# Patient Record
Sex: MALE | Race: WHITE | Employment: OTHER | URBAN - METROPOLITAN AREA
[De-identification: names, ages, dates, MRNs, and addresses within clinical notes are randomized per-mention and may not be internally consistent; named-entity substitution may affect disease eponyms.]

---

## 2017-03-13 RX ORDER — FINASTERIDE 5 MG/1
TABLET, FILM COATED ORAL
Qty: 90 TABLET | Refills: 3 | Status: SHIPPED | OUTPATIENT
Start: 2017-03-13 | End: 2019-08-26 | Stop reason: SDUPTHER

## 2017-03-16 RX ORDER — GLIPIZIDE 5 MG/1
TABLET ORAL
Qty: 360 TABLET | Refills: 1 | OUTPATIENT
Start: 2017-03-16

## 2017-04-18 ENCOUNTER — OFFICE VISIT (OUTPATIENT)
Dept: UROLOGY | Age: 82
End: 2017-04-18

## 2017-04-18 VITALS
SYSTOLIC BLOOD PRESSURE: 118 MMHG | BODY MASS INDEX: 27.4 KG/M2 | DIASTOLIC BLOOD PRESSURE: 60 MMHG | HEIGHT: 69 IN | HEART RATE: 68 BPM | WEIGHT: 185 LBS

## 2017-04-18 DIAGNOSIS — N40.1 BPH WITH OBSTRUCTION/LOWER URINARY TRACT SYMPTOMS: Primary | ICD-10-CM

## 2017-04-18 DIAGNOSIS — N13.8 BPH WITH OBSTRUCTION/LOWER URINARY TRACT SYMPTOMS: Primary | ICD-10-CM

## 2017-04-18 PROCEDURE — 99213 OFFICE O/P EST LOW 20 MIN: CPT | Performed by: UROLOGY

## 2017-04-18 ASSESSMENT — ENCOUNTER SYMPTOMS
ABDOMINAL PAIN: 0
SHORTNESS OF BREATH: 0
ABDOMINAL DISTENTION: 0

## 2017-06-08 RX ORDER — GLIPIZIDE 5 MG/1
TABLET ORAL
Qty: 360 TABLET | Refills: 0 | Status: SHIPPED | OUTPATIENT
Start: 2017-06-08 | End: 2017-08-30 | Stop reason: SDUPTHER

## 2017-06-14 ENCOUNTER — OFFICE VISIT (OUTPATIENT)
Dept: FAMILY MEDICINE CLINIC | Age: 82
End: 2017-06-14

## 2017-06-14 VITALS
HEART RATE: 79 BPM | HEIGHT: 69 IN | DIASTOLIC BLOOD PRESSURE: 68 MMHG | WEIGHT: 194.9 LBS | OXYGEN SATURATION: 96 % | TEMPERATURE: 97.6 F | RESPIRATION RATE: 20 BRPM | BODY MASS INDEX: 28.87 KG/M2 | SYSTOLIC BLOOD PRESSURE: 132 MMHG

## 2017-06-14 DIAGNOSIS — E11.59 TYPE 2 DIABETES MELLITUS WITH OTHER CIRCULATORY COMPLICATIONS (HCC): Primary | ICD-10-CM

## 2017-06-14 DIAGNOSIS — I10 ESSENTIAL HYPERTENSION: ICD-10-CM

## 2017-06-14 DIAGNOSIS — E78.5 HYPERLIPIDEMIA, UNSPECIFIED HYPERLIPIDEMIA TYPE: ICD-10-CM

## 2017-06-14 DIAGNOSIS — N18.30 CKD (CHRONIC KIDNEY DISEASE) STAGE 3, GFR 30-59 ML/MIN (HCC): ICD-10-CM

## 2017-06-14 DIAGNOSIS — E11.59 TYPE 2 DIABETES MELLITUS WITH OTHER CIRCULATORY COMPLICATIONS (HCC): ICD-10-CM

## 2017-06-14 LAB
CREATININE URINE: 146.1 MG/DL
MICROALBUMIN UR-MCNC: 19.2 MG/DL
MICROALBUMIN/CREAT UR-RTO: 131.4 MG/G (ref 0–30)

## 2017-06-14 PROCEDURE — 99214 OFFICE O/P EST MOD 30 MIN: CPT | Performed by: FAMILY MEDICINE

## 2017-06-14 ASSESSMENT — ENCOUNTER SYMPTOMS: ABDOMINAL PAIN: 0

## 2017-06-19 DIAGNOSIS — I10 ESSENTIAL HYPERTENSION: ICD-10-CM

## 2017-06-19 DIAGNOSIS — E11.59 TYPE 2 DIABETES MELLITUS WITH OTHER CIRCULATORY COMPLICATIONS (HCC): ICD-10-CM

## 2017-06-19 DIAGNOSIS — E78.5 HYPERLIPIDEMIA, UNSPECIFIED HYPERLIPIDEMIA TYPE: ICD-10-CM

## 2017-06-19 LAB
ALBUMIN SERPL-MCNC: 3.9 G/DL (ref 3.9–4.9)
ALP BLD-CCNC: 69 U/L (ref 35–104)
ALT SERPL-CCNC: 14 U/L (ref 0–41)
ANION GAP SERPL CALCULATED.3IONS-SCNC: 13 MEQ/L (ref 7–13)
AST SERPL-CCNC: 16 U/L (ref 0–40)
BASOPHILS ABSOLUTE: 0.1 K/UL (ref 0–0.2)
BASOPHILS RELATIVE PERCENT: 1.1 %
BILIRUB SERPL-MCNC: 0.4 MG/DL (ref 0–1.2)
BUN BLDV-MCNC: 39 MG/DL (ref 8–23)
CALCIUM SERPL-MCNC: 8.9 MG/DL (ref 8.6–10.2)
CHLORIDE BLD-SCNC: 105 MEQ/L (ref 98–107)
CHOLESTEROL, TOTAL: 161 MG/DL (ref 0–199)
CO2: 24 MEQ/L (ref 22–29)
CREAT SERPL-MCNC: 1.62 MG/DL (ref 0.7–1.2)
EOSINOPHILS ABSOLUTE: 0.2 K/UL (ref 0–0.7)
EOSINOPHILS RELATIVE PERCENT: 3.3 %
GFR AFRICAN AMERICAN: 49.2
GFR NON-AFRICAN AMERICAN: 40.6
GLOBULIN: 2.1 G/DL (ref 2.3–3.5)
GLUCOSE BLD-MCNC: 241 MG/DL (ref 74–109)
HBA1C MFR BLD: 9.8 % (ref 4.8–5.9)
HCT VFR BLD CALC: 38.9 % (ref 42–52)
HDLC SERPL-MCNC: 37 MG/DL (ref 40–59)
HEMOGLOBIN: 12.6 G/DL (ref 14–18)
LDL CHOLESTEROL CALCULATED: 100 MG/DL (ref 0–129)
LYMPHOCYTES ABSOLUTE: 1.9 K/UL (ref 1–4.8)
LYMPHOCYTES RELATIVE PERCENT: 29.1 %
MCH RBC QN AUTO: 26.8 PG (ref 27–31.3)
MCHC RBC AUTO-ENTMCNC: 32.3 % (ref 33–37)
MCV RBC AUTO: 83 FL (ref 80–100)
MONOCYTES ABSOLUTE: 0.6 K/UL (ref 0.2–0.8)
MONOCYTES RELATIVE PERCENT: 9.3 %
NEUTROPHILS ABSOLUTE: 3.7 K/UL (ref 1.4–6.5)
NEUTROPHILS RELATIVE PERCENT: 57.2 %
PDW BLD-RTO: 14.8 % (ref 11.5–14.5)
PLATELET # BLD: 148 K/UL (ref 130–400)
POTASSIUM SERPL-SCNC: 4.8 MEQ/L (ref 3.5–5.1)
RBC # BLD: 4.69 M/UL (ref 4.7–6.1)
SODIUM BLD-SCNC: 142 MEQ/L (ref 132–144)
TOTAL PROTEIN: 6 G/DL (ref 6.4–8.1)
TRIGL SERPL-MCNC: 119 MG/DL (ref 0–200)
WBC # BLD: 6.5 K/UL (ref 4.8–10.8)

## 2017-06-26 ENCOUNTER — OFFICE VISIT (OUTPATIENT)
Dept: FAMILY MEDICINE CLINIC | Age: 82
End: 2017-06-26

## 2017-06-26 VITALS
HEART RATE: 73 BPM | OXYGEN SATURATION: 97 % | BODY MASS INDEX: 28.91 KG/M2 | TEMPERATURE: 96.9 F | HEIGHT: 69 IN | SYSTOLIC BLOOD PRESSURE: 126 MMHG | RESPIRATION RATE: 18 BRPM | DIASTOLIC BLOOD PRESSURE: 66 MMHG | WEIGHT: 195.2 LBS

## 2017-06-26 DIAGNOSIS — E78.5 HYPERLIPIDEMIA, UNSPECIFIED HYPERLIPIDEMIA TYPE: ICD-10-CM

## 2017-06-26 DIAGNOSIS — D64.9 ANEMIA, UNSPECIFIED TYPE: ICD-10-CM

## 2017-06-26 DIAGNOSIS — E11.59 TYPE 2 DIABETES MELLITUS WITH OTHER CIRCULATORY COMPLICATIONS (HCC): Primary | ICD-10-CM

## 2017-06-26 PROCEDURE — 99212 OFFICE O/P EST SF 10 MIN: CPT | Performed by: FAMILY MEDICINE

## 2017-06-26 RX ORDER — ATORVASTATIN CALCIUM 80 MG/1
80 TABLET, FILM COATED ORAL DAILY
Qty: 90 TABLET | Refills: 1 | Status: SHIPPED | OUTPATIENT
Start: 2017-06-26 | End: 2017-09-01 | Stop reason: SDUPTHER

## 2017-06-26 ASSESSMENT — ENCOUNTER SYMPTOMS: ABDOMINAL PAIN: 0

## 2017-07-25 ENCOUNTER — OFFICE VISIT (OUTPATIENT)
Dept: FAMILY MEDICINE CLINIC | Age: 82
End: 2017-07-25

## 2017-07-25 VITALS
WEIGHT: 196.2 LBS | OXYGEN SATURATION: 98 % | RESPIRATION RATE: 21 BRPM | HEIGHT: 69 IN | HEART RATE: 76 BPM | DIASTOLIC BLOOD PRESSURE: 72 MMHG | BODY MASS INDEX: 29.06 KG/M2 | TEMPERATURE: 96.7 F | SYSTOLIC BLOOD PRESSURE: 132 MMHG

## 2017-07-25 DIAGNOSIS — E11.59 TYPE 2 DIABETES MELLITUS WITH OTHER CIRCULATORY COMPLICATIONS (HCC): Primary | ICD-10-CM

## 2017-07-25 PROCEDURE — 99212 OFFICE O/P EST SF 10 MIN: CPT | Performed by: FAMILY MEDICINE

## 2017-08-30 RX ORDER — GLIPIZIDE 5 MG/1
TABLET ORAL
Qty: 360 TABLET | Refills: 0 | Status: SHIPPED | OUTPATIENT
Start: 2017-08-30 | End: 2017-11-20 | Stop reason: SDUPTHER

## 2017-09-01 RX ORDER — ATORVASTATIN CALCIUM 80 MG/1
80 TABLET, FILM COATED ORAL DAILY
Qty: 90 TABLET | Refills: 0 | Status: SHIPPED | OUTPATIENT
Start: 2017-09-01 | End: 2018-01-04 | Stop reason: SDUPTHER

## 2017-09-20 DIAGNOSIS — E11.59 TYPE 2 DIABETES MELLITUS WITH OTHER CIRCULATORY COMPLICATIONS (HCC): ICD-10-CM

## 2017-09-20 LAB
ALBUMIN SERPL-MCNC: 3.9 G/DL (ref 3.9–4.9)
ALP BLD-CCNC: 75 U/L (ref 35–104)
ALT SERPL-CCNC: 14 U/L (ref 0–41)
ANION GAP SERPL CALCULATED.3IONS-SCNC: 19 MEQ/L (ref 7–13)
AST SERPL-CCNC: 18 U/L (ref 0–40)
BILIRUB SERPL-MCNC: 0.5 MG/DL (ref 0–1.2)
BUN BLDV-MCNC: 42 MG/DL (ref 8–23)
CALCIUM SERPL-MCNC: 8.9 MG/DL (ref 8.6–10.2)
CHLORIDE BLD-SCNC: 103 MEQ/L (ref 98–107)
CHOLESTEROL, TOTAL: 124 MG/DL (ref 0–199)
CO2: 21 MEQ/L (ref 22–29)
CREAT SERPL-MCNC: 1.66 MG/DL (ref 0.7–1.2)
GFR AFRICAN AMERICAN: 47.8
GFR NON-AFRICAN AMERICAN: 39.5
GLOBULIN: 2.3 G/DL (ref 2.3–3.5)
GLUCOSE BLD-MCNC: 267 MG/DL (ref 74–109)
HBA1C MFR BLD: 9.3 % (ref 4.8–5.9)
HDLC SERPL-MCNC: 36 MG/DL (ref 40–59)
LDL CHOLESTEROL CALCULATED: 61 MG/DL (ref 0–129)
POTASSIUM SERPL-SCNC: 5.3 MEQ/L (ref 3.5–5.1)
SODIUM BLD-SCNC: 143 MEQ/L (ref 132–144)
TOTAL PROTEIN: 6.2 G/DL (ref 6.4–8.1)
TRIGL SERPL-MCNC: 133 MG/DL (ref 0–200)

## 2017-09-28 ENCOUNTER — OFFICE VISIT (OUTPATIENT)
Dept: FAMILY MEDICINE CLINIC | Age: 82
End: 2017-09-28

## 2017-09-28 VITALS
HEIGHT: 69 IN | DIASTOLIC BLOOD PRESSURE: 62 MMHG | RESPIRATION RATE: 16 BRPM | TEMPERATURE: 97.8 F | OXYGEN SATURATION: 98 % | HEART RATE: 71 BPM | BODY MASS INDEX: 29.27 KG/M2 | WEIGHT: 197.6 LBS | SYSTOLIC BLOOD PRESSURE: 132 MMHG

## 2017-09-28 DIAGNOSIS — N18.30 CKD (CHRONIC KIDNEY DISEASE) STAGE 3, GFR 30-59 ML/MIN (HCC): ICD-10-CM

## 2017-09-28 DIAGNOSIS — E11.59 TYPE 2 DIABETES MELLITUS WITH OTHER CIRCULATORY COMPLICATIONS (HCC): Primary | ICD-10-CM

## 2017-09-28 DIAGNOSIS — E87.5 HYPERKALEMIA: ICD-10-CM

## 2017-09-28 DIAGNOSIS — D64.9 ANEMIA, UNSPECIFIED TYPE: ICD-10-CM

## 2017-09-28 LAB — POTASSIUM SERPL-SCNC: 5 MEQ/L (ref 3.5–5.1)

## 2017-09-28 PROCEDURE — 99213 OFFICE O/P EST LOW 20 MIN: CPT | Performed by: FAMILY MEDICINE

## 2017-09-28 ASSESSMENT — ENCOUNTER SYMPTOMS: ABDOMINAL PAIN: 0

## 2017-11-20 RX ORDER — GLIPIZIDE 5 MG/1
TABLET ORAL
Qty: 360 TABLET | Refills: 0 | Status: SHIPPED | OUTPATIENT
Start: 2017-11-20 | End: 2017-11-22 | Stop reason: SDUPTHER

## 2017-11-22 RX ORDER — GLIPIZIDE 5 MG/1
TABLET ORAL
Qty: 360 TABLET | Refills: 0 | Status: SHIPPED | OUTPATIENT
Start: 2017-11-22 | End: 2018-01-04 | Stop reason: SDUPTHER

## 2017-12-28 DIAGNOSIS — E11.59 TYPE 2 DIABETES MELLITUS WITH OTHER CIRCULATORY COMPLICATIONS (CODE): ICD-10-CM

## 2017-12-28 LAB — HBA1C MFR BLD: 9.4 % (ref 4.8–5.9)

## 2018-01-04 ENCOUNTER — OFFICE VISIT (OUTPATIENT)
Dept: FAMILY MEDICINE CLINIC | Age: 83
End: 2018-01-04

## 2018-01-04 VITALS
SYSTOLIC BLOOD PRESSURE: 128 MMHG | TEMPERATURE: 98.3 F | DIASTOLIC BLOOD PRESSURE: 64 MMHG | HEART RATE: 80 BPM | OXYGEN SATURATION: 94 % | RESPIRATION RATE: 18 BRPM | BODY MASS INDEX: 29.18 KG/M2 | WEIGHT: 197 LBS | HEIGHT: 69 IN

## 2018-01-04 DIAGNOSIS — N18.30 CKD (CHRONIC KIDNEY DISEASE) STAGE 3, GFR 30-59 ML/MIN (HCC): ICD-10-CM

## 2018-01-04 DIAGNOSIS — E11.59 TYPE 2 DIABETES MELLITUS WITH OTHER CIRCULATORY COMPLICATIONS (CODE): Primary | ICD-10-CM

## 2018-01-04 DIAGNOSIS — E11.59 TYPE 2 DIABETES MELLITUS WITH OTHER CIRCULATORY COMPLICATIONS (CODE): ICD-10-CM

## 2018-01-04 DIAGNOSIS — E78.5 HYPERLIPIDEMIA, UNSPECIFIED HYPERLIPIDEMIA TYPE: ICD-10-CM

## 2018-01-04 DIAGNOSIS — I10 ESSENTIAL HYPERTENSION: ICD-10-CM

## 2018-01-04 LAB
CREATININE URINE: 150.9 MG/DL
MICROALBUMIN UR-MCNC: 21.7 MG/DL
MICROALBUMIN/CREAT UR-RTO: 143.8 MG/G (ref 0–30)

## 2018-01-04 PROCEDURE — 1036F TOBACCO NON-USER: CPT | Performed by: FAMILY MEDICINE

## 2018-01-04 PROCEDURE — G8417 CALC BMI ABV UP PARAM F/U: HCPCS | Performed by: FAMILY MEDICINE

## 2018-01-04 PROCEDURE — 4040F PNEUMOC VAC/ADMIN/RCVD: CPT | Performed by: FAMILY MEDICINE

## 2018-01-04 PROCEDURE — 1123F ACP DISCUSS/DSCN MKR DOCD: CPT | Performed by: FAMILY MEDICINE

## 2018-01-04 PROCEDURE — G8484 FLU IMMUNIZE NO ADMIN: HCPCS | Performed by: FAMILY MEDICINE

## 2018-01-04 PROCEDURE — G8598 ASA/ANTIPLAT THER USED: HCPCS | Performed by: FAMILY MEDICINE

## 2018-01-04 PROCEDURE — 99214 OFFICE O/P EST MOD 30 MIN: CPT | Performed by: FAMILY MEDICINE

## 2018-01-04 PROCEDURE — G8427 DOCREV CUR MEDS BY ELIG CLIN: HCPCS | Performed by: FAMILY MEDICINE

## 2018-01-04 RX ORDER — ATORVASTATIN CALCIUM 80 MG/1
80 TABLET, FILM COATED ORAL DAILY
Qty: 90 TABLET | Refills: 1 | Status: SHIPPED | OUTPATIENT
Start: 2018-01-04 | End: 2018-05-12 | Stop reason: SDUPTHER

## 2018-01-04 RX ORDER — GLIPIZIDE 5 MG/1
TABLET ORAL
Qty: 360 TABLET | Refills: 0 | Status: SHIPPED | OUTPATIENT
Start: 2018-01-04 | End: 2018-05-12 | Stop reason: SDUPTHER

## 2018-01-04 ASSESSMENT — ENCOUNTER SYMPTOMS: ABDOMINAL PAIN: 0

## 2018-01-04 NOTE — PROGRESS NOTES
with supper       finasteride (PROSCAR) 5 MG tablet TAKE ONE TABLET BY MOUTH ONCE DAILY 90 tablet 3    losartan-hydrochlorothiazide (HYZAAR) 100-12.5 MG per tablet   Take 1 tablet by mouth       carvedilol (COREG) 6.25 MG tablet Take 6.25 mg by mouth 2 times daily (with meals).  omeprazole (PRILOSEC) 20 MG capsule Take 20 mg by mouth daily.  aspirin 81 MG tablet Take 81 mg by mouth daily. No current facility-administered medications for this visit. History reviewed. No pertinent family history. Past Medical History:   Diagnosis Date    CAD (coronary artery disease)     Hyperlipidemia     Hypertension     Type II or unspecified type diabetes mellitus without mention of complication, not stated as uncontrolled      Objective:   /64 (Site: Left Arm, Position: Sitting, Cuff Size: Small Adult)   Pulse 80   Temp 98.3 °F (36.8 °C) (Tympanic)   Resp 18   Ht 5' 9\" (1.753 m)   Wt 197 lb (89.4 kg)   SpO2 94%   BMI 29.09 kg/m²     Physical Exam  Neck:no carotid bruits. No masses. No adenopathy. No thyroid asymmetry. Lungs:clear and equal breath sounds. No wheezes or rales. Heart:rate reg. No murmur. No gallops   Pulses:Radials 2+ equal               Non palpable D.P or P.T bilaterally   Extremities: trace edema of both lower legs   Gen: In no acute distress        Dorsalis pedis and posterior tibial pulses are non palpable   No fissures between the toes. No open sores on the feet. Tactile sensation intact   Assessment:      1. Type 2 diabetes mellitus with other circulatory complications (CODE) ([facility] [address]     2. Essential hypertension     3. Hyperlipidemia, unspecified hyperlipidemia type     4.  CKD (chronic kidney disease) stage 3, GFR 30-59 ml/min           Plan:    continue current medications  Orders Placed This Encounter   Medications    glipiZIDE (GLUCOTROL) 5 MG tablet     Sig: TAKE 2 TABLETS TWICE DAILY BEFORE MEALS     Dispense:  360 tablet     Refill:  0   

## 2018-01-25 ENCOUNTER — OFFICE VISIT (OUTPATIENT)
Dept: FAMILY MEDICINE CLINIC | Age: 83
End: 2018-01-25
Payer: MEDICARE

## 2018-01-25 VITALS
SYSTOLIC BLOOD PRESSURE: 120 MMHG | HEIGHT: 69 IN | WEIGHT: 196 LBS | BODY MASS INDEX: 29.03 KG/M2 | RESPIRATION RATE: 15 BRPM | TEMPERATURE: 97.7 F | DIASTOLIC BLOOD PRESSURE: 60 MMHG | HEART RATE: 70 BPM

## 2018-01-25 DIAGNOSIS — J01.40 ACUTE NON-RECURRENT PANSINUSITIS: ICD-10-CM

## 2018-01-25 DIAGNOSIS — J20.9 ACUTE BRONCHITIS, UNSPECIFIED ORGANISM: Primary | ICD-10-CM

## 2018-01-25 DIAGNOSIS — E11.59 TYPE 2 DIABETES MELLITUS WITH OTHER CIRCULATORY COMPLICATIONS (CODE): ICD-10-CM

## 2018-01-25 PROCEDURE — 99213 OFFICE O/P EST LOW 20 MIN: CPT | Performed by: FAMILY MEDICINE

## 2018-01-25 PROCEDURE — 1036F TOBACCO NON-USER: CPT | Performed by: FAMILY MEDICINE

## 2018-01-25 PROCEDURE — 4040F PNEUMOC VAC/ADMIN/RCVD: CPT | Performed by: FAMILY MEDICINE

## 2018-01-25 PROCEDURE — 1123F ACP DISCUSS/DSCN MKR DOCD: CPT | Performed by: FAMILY MEDICINE

## 2018-01-25 PROCEDURE — G8417 CALC BMI ABV UP PARAM F/U: HCPCS | Performed by: FAMILY MEDICINE

## 2018-01-25 PROCEDURE — G8484 FLU IMMUNIZE NO ADMIN: HCPCS | Performed by: FAMILY MEDICINE

## 2018-01-25 PROCEDURE — G8598 ASA/ANTIPLAT THER USED: HCPCS | Performed by: FAMILY MEDICINE

## 2018-01-25 PROCEDURE — G8427 DOCREV CUR MEDS BY ELIG CLIN: HCPCS | Performed by: FAMILY MEDICINE

## 2018-01-25 RX ORDER — CEFUROXIME AXETIL 250 MG/1
250 TABLET ORAL 2 TIMES DAILY
Qty: 20 TABLET | Refills: 0 | Status: SHIPPED | OUTPATIENT
Start: 2018-01-25 | End: 2018-02-04

## 2018-01-25 ASSESSMENT — ENCOUNTER SYMPTOMS
ABDOMINAL PAIN: 0
SHORTNESS OF BREATH: 0

## 2018-01-25 ASSESSMENT — PATIENT HEALTH QUESTIONNAIRE - PHQ9
1. LITTLE INTEREST OR PLEASURE IN DOING THINGS: 0
2. FEELING DOWN, DEPRESSED OR HOPELESS: 0
SUM OF ALL RESPONSES TO PHQ9 QUESTIONS 1 & 2: 0
SUM OF ALL RESPONSES TO PHQ QUESTIONS 1-9: 0

## 2018-01-25 NOTE — PROGRESS NOTES
Subjective:      Patient ID: Ron Mcneill is a 80 y.o. male    HPI  Here with cold symptoms that started about 2 weeks ago. Starting to bring up discolored phlegm. No sore throat. Has had some hoarseness and slight nasal congestion. No fever. Has had discolored nasal drainage as well. Also here for follow up of blood sugars. Still running in upper 100's in am but has been sick during this time  Review of Systems   Constitutional: Negative for activity change, chills and unexpected weight change. HENT: Positive for congestion. Respiratory: Negative for shortness of breath. Cardiovascular: Negative for chest pain. Gastrointestinal: Negative for abdominal pain. Skin: Negative for rash. Reviewed allergy, medical, social, surgical, family and med list changes and updated   Files  Social History     Social History    Marital status:      Spouse name: N/A    Number of children: N/A    Years of education: N/A     Social History Main Topics    Smoking status: Never Smoker    Smokeless tobacco: Never Used    Alcohol use No    Drug use: No    Sexual activity: No     Other Topics Concern    None     Social History Narrative    None     Current Outpatient Prescriptions   Medication Sig Dispense Refill    glipiZIDE (GLUCOTROL) 5 MG tablet TAKE 2 TABLETS TWICE DAILY BEFORE MEALS 360 tablet 0    atorvastatin (LIPITOR) 80 MG tablet Take 1 tablet by mouth daily 90 tablet 1    insulin glargine (LANTUS SOLOSTAR) 100 UNIT/ML injection pen Inject 41 Units into the skin daily       finasteride (PROSCAR) 5 MG tablet TAKE ONE TABLET BY MOUTH ONCE DAILY 90 tablet 3    losartan-hydrochlorothiazide (HYZAAR) 100-12.5 MG per tablet   Take 1 tablet by mouth       carvedilol (COREG) 6.25 MG tablet Take 6.25 mg by mouth 2 times daily (with meals).  omeprazole (PRILOSEC) 20 MG capsule Take 20 mg by mouth daily.  aspirin 81 MG tablet Take 81 mg by mouth daily.          No current

## 2018-02-27 RX ORDER — LOSARTAN POTASSIUM AND HYDROCHLOROTHIAZIDE 12.5; 1 MG/1; MG/1
1 TABLET ORAL DAILY
Qty: 90 TABLET | Refills: 0 | Status: SHIPPED | OUTPATIENT
Start: 2018-02-27 | End: 2018-05-12 | Stop reason: SDUPTHER

## 2018-02-27 RX ORDER — FINASTERIDE 5 MG/1
5 TABLET, FILM COATED ORAL DAILY
Qty: 90 TABLET | Refills: 3 | Status: ON HOLD | OUTPATIENT
Start: 2018-02-27 | End: 2018-09-27

## 2018-04-19 DIAGNOSIS — N40.1 BENIGN PROSTATIC HYPERPLASIA WITH LOWER URINARY TRACT SYMPTOMS, SYMPTOM DETAILS UNSPECIFIED: ICD-10-CM

## 2018-04-19 DIAGNOSIS — N40.1 BENIGN PROSTATIC HYPERPLASIA WITH LOWER URINARY TRACT SYMPTOMS, SYMPTOM DETAILS UNSPECIFIED: Primary | ICD-10-CM

## 2018-04-19 LAB — PROSTATE SPECIFIC ANTIGEN: 2.75 NG/ML (ref 0–6.22)

## 2018-04-24 ENCOUNTER — OFFICE VISIT (OUTPATIENT)
Dept: UROLOGY | Age: 83
End: 2018-04-24
Payer: MEDICARE

## 2018-04-24 VITALS
SYSTOLIC BLOOD PRESSURE: 132 MMHG | HEART RATE: 67 BPM | HEIGHT: 70 IN | DIASTOLIC BLOOD PRESSURE: 60 MMHG | BODY MASS INDEX: 26.48 KG/M2 | WEIGHT: 185 LBS

## 2018-04-24 DIAGNOSIS — N13.8 BPH WITH OBSTRUCTION/LOWER URINARY TRACT SYMPTOMS: Primary | ICD-10-CM

## 2018-04-24 DIAGNOSIS — N40.1 BPH WITH OBSTRUCTION/LOWER URINARY TRACT SYMPTOMS: Primary | ICD-10-CM

## 2018-04-24 DIAGNOSIS — R97.20 ELEVATED PSA: ICD-10-CM

## 2018-04-24 PROCEDURE — 1123F ACP DISCUSS/DSCN MKR DOCD: CPT | Performed by: UROLOGY

## 2018-04-24 PROCEDURE — 99213 OFFICE O/P EST LOW 20 MIN: CPT | Performed by: UROLOGY

## 2018-04-24 PROCEDURE — G8598 ASA/ANTIPLAT THER USED: HCPCS | Performed by: UROLOGY

## 2018-04-24 PROCEDURE — G8417 CALC BMI ABV UP PARAM F/U: HCPCS | Performed by: UROLOGY

## 2018-04-24 PROCEDURE — 1036F TOBACCO NON-USER: CPT | Performed by: UROLOGY

## 2018-04-24 PROCEDURE — 4040F PNEUMOC VAC/ADMIN/RCVD: CPT | Performed by: UROLOGY

## 2018-04-24 PROCEDURE — G8427 DOCREV CUR MEDS BY ELIG CLIN: HCPCS | Performed by: UROLOGY

## 2018-04-24 ASSESSMENT — ENCOUNTER SYMPTOMS
SHORTNESS OF BREATH: 0
ABDOMINAL PAIN: 0
ABDOMINAL DISTENTION: 0

## 2018-05-12 ENCOUNTER — OFFICE VISIT (OUTPATIENT)
Dept: FAMILY MEDICINE CLINIC | Age: 83
End: 2018-05-12
Payer: MEDICARE

## 2018-05-12 VITALS
SYSTOLIC BLOOD PRESSURE: 128 MMHG | DIASTOLIC BLOOD PRESSURE: 76 MMHG | HEIGHT: 69 IN | BODY MASS INDEX: 28.88 KG/M2 | TEMPERATURE: 97.4 F | RESPIRATION RATE: 16 BRPM | HEART RATE: 72 BPM | WEIGHT: 195 LBS

## 2018-05-12 DIAGNOSIS — N18.30 CKD (CHRONIC KIDNEY DISEASE) STAGE 3, GFR 30-59 ML/MIN (HCC): ICD-10-CM

## 2018-05-12 DIAGNOSIS — E78.5 HYPERLIPIDEMIA, UNSPECIFIED HYPERLIPIDEMIA TYPE: ICD-10-CM

## 2018-05-12 DIAGNOSIS — E11.59 TYPE 2 DIABETES MELLITUS WITH OTHER CIRCULATORY COMPLICATIONS (CODE): Primary | ICD-10-CM

## 2018-05-12 DIAGNOSIS — I10 ESSENTIAL HYPERTENSION: ICD-10-CM

## 2018-05-12 PROCEDURE — G8417 CALC BMI ABV UP PARAM F/U: HCPCS | Performed by: FAMILY MEDICINE

## 2018-05-12 PROCEDURE — G8427 DOCREV CUR MEDS BY ELIG CLIN: HCPCS | Performed by: FAMILY MEDICINE

## 2018-05-12 PROCEDURE — 1123F ACP DISCUSS/DSCN MKR DOCD: CPT | Performed by: FAMILY MEDICINE

## 2018-05-12 PROCEDURE — 1036F TOBACCO NON-USER: CPT | Performed by: FAMILY MEDICINE

## 2018-05-12 PROCEDURE — 99214 OFFICE O/P EST MOD 30 MIN: CPT | Performed by: FAMILY MEDICINE

## 2018-05-12 PROCEDURE — 4040F PNEUMOC VAC/ADMIN/RCVD: CPT | Performed by: FAMILY MEDICINE

## 2018-05-12 PROCEDURE — G8598 ASA/ANTIPLAT THER USED: HCPCS | Performed by: FAMILY MEDICINE

## 2018-05-12 RX ORDER — GLIPIZIDE 5 MG/1
TABLET ORAL
Qty: 360 TABLET | Refills: 1 | Status: ON HOLD | OUTPATIENT
Start: 2018-05-12 | End: 2018-09-29 | Stop reason: HOSPADM

## 2018-05-12 RX ORDER — ATORVASTATIN CALCIUM 80 MG/1
80 TABLET, FILM COATED ORAL DAILY
Qty: 90 TABLET | Refills: 1 | Status: SHIPPED | OUTPATIENT
Start: 2018-05-12

## 2018-05-12 RX ORDER — LOSARTAN POTASSIUM AND HYDROCHLOROTHIAZIDE 12.5; 1 MG/1; MG/1
1 TABLET ORAL DAILY
Qty: 90 TABLET | Refills: 1 | Status: ON HOLD | OUTPATIENT
Start: 2018-05-12 | End: 2018-09-29 | Stop reason: HOSPADM

## 2018-05-12 ASSESSMENT — ENCOUNTER SYMPTOMS
SHORTNESS OF BREATH: 0
ABDOMINAL PAIN: 0

## 2018-09-27 ENCOUNTER — APPOINTMENT (OUTPATIENT)
Dept: GENERAL RADIOLOGY | Age: 83
DRG: 291 | End: 2018-09-27
Payer: MEDICARE

## 2018-09-27 ENCOUNTER — TELEPHONE (OUTPATIENT)
Dept: FAMILY MEDICINE CLINIC | Age: 83
End: 2018-09-27

## 2018-09-27 ENCOUNTER — HOSPITAL ENCOUNTER (INPATIENT)
Age: 83
LOS: 3 days | Discharge: HOME | DRG: 291 | End: 2018-09-30
Attending: STUDENT IN AN ORGANIZED HEALTH CARE EDUCATION/TRAINING PROGRAM | Admitting: INTERNAL MEDICINE
Payer: MEDICARE

## 2018-09-27 DIAGNOSIS — I50.9 CONGESTIVE HEART FAILURE, UNSPECIFIED HF CHRONICITY, UNSPECIFIED HEART FAILURE TYPE (HCC): Primary | ICD-10-CM

## 2018-09-27 DIAGNOSIS — E11.65 TYPE 2 DIABETES MELLITUS WITH HYPERGLYCEMIA, UNSPECIFIED WHETHER LONG TERM INSULIN USE (HCC): ICD-10-CM

## 2018-09-27 DIAGNOSIS — R94.31 ABNORMAL EKG: ICD-10-CM

## 2018-09-27 DIAGNOSIS — R06.09 EXERTIONAL DYSPNEA: ICD-10-CM

## 2018-09-27 DIAGNOSIS — E66.9 OBESITY WITH SERIOUS COMORBIDITY, UNSPECIFIED CLASSIFICATION, UNSPECIFIED OBESITY TYPE: ICD-10-CM

## 2018-09-27 DIAGNOSIS — N18.30 CKD STAGE 3 DUE TO TYPE 2 DIABETES MELLITUS (HCC): ICD-10-CM

## 2018-09-27 DIAGNOSIS — E11.22 CKD STAGE 3 DUE TO TYPE 2 DIABETES MELLITUS (HCC): ICD-10-CM

## 2018-09-27 DIAGNOSIS — N28.9 RENAL INSUFFICIENCY: ICD-10-CM

## 2018-09-27 PROBLEM — I50.43 CHF (CONGESTIVE HEART FAILURE), NYHA CLASS I, ACUTE ON CHRONIC, COMBINED (HCC): Status: ACTIVE | Noted: 2018-09-27

## 2018-09-27 LAB
ALBUMIN SERPL-MCNC: 3.8 G/DL (ref 3.9–4.9)
ALP BLD-CCNC: 108 U/L (ref 35–104)
ALT SERPL-CCNC: 19 U/L (ref 0–41)
ANION GAP SERPL CALCULATED.3IONS-SCNC: 12 MEQ/L (ref 7–13)
APTT: 27.6 SEC (ref 21.6–35.4)
AST SERPL-CCNC: 20 U/L (ref 0–40)
BASOPHILS ABSOLUTE: 0.1 K/UL (ref 0–0.2)
BASOPHILS RELATIVE PERCENT: 1.1 %
BILIRUB SERPL-MCNC: 0.8 MG/DL (ref 0–1.2)
BUN BLDV-MCNC: 28 MG/DL (ref 8–23)
C-REACTIVE PROTEIN, HIGH SENSITIVITY: 15.4 MG/L (ref 0–5)
CALCIUM SERPL-MCNC: 8.9 MG/DL (ref 8.6–10.2)
CHLORIDE BLD-SCNC: 102 MEQ/L (ref 98–107)
CO2: 25 MEQ/L (ref 22–29)
CREAT SERPL-MCNC: 1.43 MG/DL (ref 0.7–1.2)
EOSINOPHILS ABSOLUTE: 0.2 K/UL (ref 0–0.7)
EOSINOPHILS RELATIVE PERCENT: 2.6 %
GFR AFRICAN AMERICAN: 56.6
GFR NON-AFRICAN AMERICAN: 46.8
GLOBULIN: 2.7 G/DL (ref 2.3–3.5)
GLUCOSE BLD-MCNC: 193 MG/DL (ref 60–115)
GLUCOSE BLD-MCNC: 222 MG/DL (ref 60–115)
GLUCOSE BLD-MCNC: 243 MG/DL (ref 74–109)
HCT VFR BLD CALC: 37.5 % (ref 42–52)
HEMOGLOBIN: 12.3 G/DL (ref 14–18)
INR BLD: 1
LYMPHOCYTES ABSOLUTE: 1 K/UL (ref 1–4.8)
LYMPHOCYTES RELATIVE PERCENT: 14.7 %
MCH RBC QN AUTO: 27.3 PG (ref 27–31.3)
MCHC RBC AUTO-ENTMCNC: 32.8 % (ref 33–37)
MCV RBC AUTO: 83.2 FL (ref 80–100)
MONOCYTES ABSOLUTE: 0.5 K/UL (ref 0.2–0.8)
MONOCYTES RELATIVE PERCENT: 7.9 %
NEUTROPHILS ABSOLUTE: 5 K/UL (ref 1.4–6.5)
NEUTROPHILS RELATIVE PERCENT: 73.7 %
PDW BLD-RTO: 17.8 % (ref 11.5–14.5)
PERFORMED ON: ABNORMAL
PERFORMED ON: ABNORMAL
PLATELET # BLD: 163 K/UL (ref 130–400)
POTASSIUM SERPL-SCNC: 4.8 MEQ/L (ref 3.5–5.1)
PRO-BNP: 1343 PG/ML
PROTHROMBIN TIME: 10.2 SEC (ref 9.6–12.3)
RBC # BLD: 4.5 M/UL (ref 4.7–6.1)
SODIUM BLD-SCNC: 139 MEQ/L (ref 132–144)
TOTAL CK: 142 U/L (ref 0–190)
TOTAL PROTEIN: 6.5 G/DL (ref 6.4–8.1)
TROPONIN: <0.01 NG/ML (ref 0–0.01)
TROPONIN: <0.01 NG/ML (ref 0–0.01)
WBC # BLD: 6.8 K/UL (ref 4.8–10.8)

## 2018-09-27 PROCEDURE — 85610 PROTHROMBIN TIME: CPT

## 2018-09-27 PROCEDURE — 6370000000 HC RX 637 (ALT 250 FOR IP): Performed by: STUDENT IN AN ORGANIZED HEALTH CARE EDUCATION/TRAINING PROGRAM

## 2018-09-27 PROCEDURE — 2060000000 HC ICU INTERMEDIATE R&B

## 2018-09-27 PROCEDURE — 80053 COMPREHEN METABOLIC PANEL: CPT

## 2018-09-27 PROCEDURE — 96374 THER/PROPH/DIAG INJ IV PUSH: CPT

## 2018-09-27 PROCEDURE — 96372 THER/PROPH/DIAG INJ SC/IM: CPT

## 2018-09-27 PROCEDURE — 6360000002 HC RX W HCPCS: Performed by: STUDENT IN AN ORGANIZED HEALTH CARE EDUCATION/TRAINING PROGRAM

## 2018-09-27 PROCEDURE — 85025 COMPLETE CBC W/AUTO DIFF WBC: CPT

## 2018-09-27 PROCEDURE — 84484 ASSAY OF TROPONIN QUANT: CPT

## 2018-09-27 PROCEDURE — 36415 COLL VENOUS BLD VENIPUNCTURE: CPT

## 2018-09-27 PROCEDURE — 82550 ASSAY OF CK (CPK): CPT

## 2018-09-27 PROCEDURE — 93005 ELECTROCARDIOGRAM TRACING: CPT

## 2018-09-27 PROCEDURE — 6370000000 HC RX 637 (ALT 250 FOR IP): Performed by: INTERNAL MEDICINE

## 2018-09-27 PROCEDURE — 85730 THROMBOPLASTIN TIME PARTIAL: CPT

## 2018-09-27 PROCEDURE — 99285 EMERGENCY DEPT VISIT HI MDM: CPT

## 2018-09-27 PROCEDURE — 86141 C-REACTIVE PROTEIN HS: CPT

## 2018-09-27 PROCEDURE — 83880 ASSAY OF NATRIURETIC PEPTIDE: CPT

## 2018-09-27 PROCEDURE — 6360000002 HC RX W HCPCS: Performed by: PHYSICIAN ASSISTANT

## 2018-09-27 PROCEDURE — 71046 X-RAY EXAM CHEST 2 VIEWS: CPT

## 2018-09-27 PROCEDURE — 6370000000 HC RX 637 (ALT 250 FOR IP): Performed by: PHYSICIAN ASSISTANT

## 2018-09-27 PROCEDURE — 2580000003 HC RX 258: Performed by: PHYSICIAN ASSISTANT

## 2018-09-27 PROCEDURE — 96376 TX/PRO/DX INJ SAME DRUG ADON: CPT

## 2018-09-27 RX ORDER — PANTOPRAZOLE SODIUM 40 MG/1
40 TABLET, DELAYED RELEASE ORAL
Status: DISCONTINUED | OUTPATIENT
Start: 2018-09-27 | End: 2018-09-30 | Stop reason: HOSPADM

## 2018-09-27 RX ORDER — METOLAZONE 5 MG/1
2.5 TABLET ORAL ONCE
Status: COMPLETED | OUTPATIENT
Start: 2018-09-27 | End: 2018-09-27

## 2018-09-27 RX ORDER — FINASTERIDE 5 MG/1
5 TABLET, FILM COATED ORAL DAILY
Status: DISCONTINUED | OUTPATIENT
Start: 2018-09-27 | End: 2018-09-30 | Stop reason: HOSPADM

## 2018-09-27 RX ORDER — OMEPRAZOLE 20 MG/1
20 CAPSULE, DELAYED RELEASE ORAL DAILY
Status: DISCONTINUED | OUTPATIENT
Start: 2018-09-27 | End: 2018-09-27

## 2018-09-27 RX ORDER — CARVEDILOL 6.25 MG/1
6.25 TABLET ORAL 2 TIMES DAILY WITH MEALS
Status: DISCONTINUED | OUTPATIENT
Start: 2018-09-27 | End: 2018-09-27

## 2018-09-27 RX ORDER — FUROSEMIDE 10 MG/ML
20 INJECTION INTRAMUSCULAR; INTRAVENOUS 2 TIMES DAILY
Status: DISCONTINUED | OUTPATIENT
Start: 2018-09-27 | End: 2018-09-28

## 2018-09-27 RX ORDER — CARVEDILOL 3.12 MG/1
3.12 TABLET ORAL 2 TIMES DAILY WITH MEALS
Status: DISCONTINUED | OUTPATIENT
Start: 2018-09-27 | End: 2018-09-28

## 2018-09-27 RX ORDER — LOSARTAN POTASSIUM 25 MG/1
25 TABLET ORAL 2 TIMES DAILY
Status: DISCONTINUED | OUTPATIENT
Start: 2018-09-28 | End: 2018-09-28

## 2018-09-27 RX ORDER — LANOLIN ALCOHOL/MO/W.PET/CERES
1000 CREAM (GRAM) TOPICAL DAILY
COMMUNITY

## 2018-09-27 RX ORDER — ASPIRIN 81 MG/1
81 TABLET, CHEWABLE ORAL ONCE
Status: COMPLETED | OUTPATIENT
Start: 2018-09-27 | End: 2018-09-27

## 2018-09-27 RX ORDER — FUROSEMIDE 10 MG/ML
20 INJECTION INTRAMUSCULAR; INTRAVENOUS ONCE
Status: COMPLETED | OUTPATIENT
Start: 2018-09-27 | End: 2018-09-27

## 2018-09-27 RX ORDER — LOSARTAN POTASSIUM AND HYDROCHLOROTHIAZIDE 12.5; 1 MG/1; MG/1
1 TABLET ORAL DAILY
Status: DISCONTINUED | OUTPATIENT
Start: 2018-09-27 | End: 2018-09-27

## 2018-09-27 RX ORDER — LOSARTAN POTASSIUM 25 MG/1
25 TABLET ORAL DAILY
Status: DISCONTINUED | OUTPATIENT
Start: 2018-09-27 | End: 2018-09-27

## 2018-09-27 RX ORDER — DEXTROSE MONOHYDRATE 50 MG/ML
100 INJECTION, SOLUTION INTRAVENOUS PRN
Status: DISCONTINUED | OUTPATIENT
Start: 2018-09-27 | End: 2018-09-30 | Stop reason: HOSPADM

## 2018-09-27 RX ORDER — ASPIRIN 81 MG/1
81 TABLET, CHEWABLE ORAL DAILY
Status: DISCONTINUED | OUTPATIENT
Start: 2018-09-27 | End: 2018-09-30 | Stop reason: HOSPADM

## 2018-09-27 RX ORDER — ATORVASTATIN CALCIUM 80 MG/1
80 TABLET, FILM COATED ORAL NIGHTLY
Status: DISCONTINUED | OUTPATIENT
Start: 2018-09-27 | End: 2018-09-30 | Stop reason: HOSPADM

## 2018-09-27 RX ORDER — INSULIN GLARGINE 100 [IU]/ML
40 INJECTION, SOLUTION SUBCUTANEOUS NIGHTLY
Status: DISCONTINUED | OUTPATIENT
Start: 2018-09-27 | End: 2018-09-30 | Stop reason: HOSPADM

## 2018-09-27 RX ORDER — ATORVASTATIN CALCIUM 80 MG/1
80 TABLET, FILM COATED ORAL DAILY
Status: DISCONTINUED | OUTPATIENT
Start: 2018-09-27 | End: 2018-09-27

## 2018-09-27 RX ORDER — NITROGLYCERIN 0.4 MG/1
0.4 TABLET SUBLINGUAL EVERY 5 MIN PRN
COMMUNITY
End: 2020-06-08

## 2018-09-27 RX ORDER — ONDANSETRON 2 MG/ML
4 INJECTION INTRAMUSCULAR; INTRAVENOUS EVERY 6 HOURS PRN
Status: DISCONTINUED | OUTPATIENT
Start: 2018-09-27 | End: 2018-09-30 | Stop reason: HOSPADM

## 2018-09-27 RX ORDER — ACETAMINOPHEN 80 MG
TABLET,CHEWABLE ORAL ONCE
Status: COMPLETED | OUTPATIENT
Start: 2018-09-27 | End: 2018-09-27

## 2018-09-27 RX ORDER — NICOTINE POLACRILEX 4 MG
15 LOZENGE BUCCAL PRN
Status: DISCONTINUED | OUTPATIENT
Start: 2018-09-27 | End: 2018-09-30 | Stop reason: HOSPADM

## 2018-09-27 RX ORDER — SODIUM CHLORIDE 0.9 % (FLUSH) 0.9 %
10 SYRINGE (ML) INJECTION EVERY 12 HOURS SCHEDULED
Status: DISCONTINUED | OUTPATIENT
Start: 2018-09-27 | End: 2018-09-30 | Stop reason: HOSPADM

## 2018-09-27 RX ORDER — SODIUM CHLORIDE 0.9 % (FLUSH) 0.9 %
10 SYRINGE (ML) INJECTION PRN
Status: DISCONTINUED | OUTPATIENT
Start: 2018-09-27 | End: 2018-09-30 | Stop reason: HOSPADM

## 2018-09-27 RX ORDER — DEXTROSE MONOHYDRATE 25 G/50ML
12.5 INJECTION, SOLUTION INTRAVENOUS PRN
Status: DISCONTINUED | OUTPATIENT
Start: 2018-09-27 | End: 2018-09-30 | Stop reason: HOSPADM

## 2018-09-27 RX ADMIN — INSULIN GLARGINE 40 UNITS: 100 INJECTION, SOLUTION SUBCUTANEOUS at 21:45

## 2018-09-27 RX ADMIN — NITROGLYCERIN 0.5 INCH: 20 OINTMENT TOPICAL at 17:08

## 2018-09-27 RX ADMIN — CARVEDILOL 3.12 MG: 3.12 TABLET, FILM COATED ORAL at 17:08

## 2018-09-27 RX ADMIN — ENOXAPARIN SODIUM 40 MG: 40 INJECTION SUBCUTANEOUS at 21:04

## 2018-09-27 RX ADMIN — FUROSEMIDE 20 MG: 10 INJECTION, SOLUTION INTRAMUSCULAR; INTRAVENOUS at 10:55

## 2018-09-27 RX ADMIN — ATORVASTATIN CALCIUM 80 MG: 80 TABLET, FILM COATED ORAL at 21:04

## 2018-09-27 RX ADMIN — ASPIRIN 81 MG 81 MG: 81 TABLET ORAL at 12:23

## 2018-09-27 RX ADMIN — Medication 10 ML: at 21:47

## 2018-09-27 RX ADMIN — INSULIN LISPRO 2 UNITS: 100 INJECTION, SOLUTION INTRAVENOUS; SUBCUTANEOUS at 17:52

## 2018-09-27 RX ADMIN — FUROSEMIDE 20 MG: 10 INJECTION, SOLUTION INTRAVENOUS at 16:04

## 2018-09-27 RX ADMIN — Medication: at 17:08

## 2018-09-27 RX ADMIN — METOLAZONE 2.5 MG: 5 TABLET ORAL at 17:08

## 2018-09-27 ASSESSMENT — ENCOUNTER SYMPTOMS
ABDOMINAL PAIN: 0
VOMITING: 0
NAUSEA: 0
TROUBLE SWALLOWING: 0
COUGH: 1
SINUS PRESSURE: 0
SHORTNESS OF BREATH: 1
DIARRHEA: 0
CHEST TIGHTNESS: 0
BACK PAIN: 0

## 2018-09-27 ASSESSMENT — PAIN SCALES - GENERAL: PAINLEVEL_OUTOF10: 0

## 2018-09-28 PROBLEM — I25.10 CORONARY ARTERY DISEASE INVOLVING NATIVE CORONARY ARTERY OF NATIVE HEART WITHOUT ANGINA PECTORIS: Status: ACTIVE | Noted: 2018-09-28

## 2018-09-28 PROBLEM — I48.0 PAROXYSMAL ATRIAL FIBRILLATION (HCC): Status: ACTIVE | Noted: 2018-09-28

## 2018-09-28 PROBLEM — E11.22 CKD STAGE 3 DUE TO TYPE 2 DIABETES MELLITUS (HCC): Status: ACTIVE | Noted: 2018-09-28

## 2018-09-28 PROBLEM — E11.9 TYPE 2 DIABETES MELLITUS (HCC): Status: ACTIVE | Noted: 2018-09-28

## 2018-09-28 PROBLEM — I10 ESSENTIAL HYPERTENSION: Status: ACTIVE | Noted: 2018-09-28

## 2018-09-28 PROBLEM — N18.30 CKD STAGE 3 DUE TO TYPE 2 DIABETES MELLITUS (HCC): Status: ACTIVE | Noted: 2018-09-28

## 2018-09-28 LAB
ANION GAP SERPL CALCULATED.3IONS-SCNC: 13 MEQ/L (ref 7–13)
BASOPHILS ABSOLUTE: 0.1 K/UL (ref 0–0.2)
BASOPHILS RELATIVE PERCENT: 0.9 %
BUN BLDV-MCNC: 29 MG/DL (ref 8–23)
CALCIUM SERPL-MCNC: 9.2 MG/DL (ref 8.6–10.2)
CHLORIDE BLD-SCNC: 103 MEQ/L (ref 98–107)
CK MB: 5.4 NG/ML (ref 0–6.7)
CO2: 26 MEQ/L (ref 22–29)
CREAT SERPL-MCNC: 1.46 MG/DL (ref 0.7–1.2)
CREATINE KINASE-MB INDEX: 4.3 % (ref 0–3.5)
EKG ATRIAL RATE: 62 BPM
EKG ATRIAL RATE: 74 BPM
EKG P AXIS: 104 DEGREES
EKG P AXIS: 40 DEGREES
EKG P-R INTERVAL: 136 MS
EKG P-R INTERVAL: 146 MS
EKG Q-T INTERVAL: 392 MS
EKG Q-T INTERVAL: 418 MS
EKG QRS DURATION: 86 MS
EKG QRS DURATION: 94 MS
EKG QTC CALCULATION (BAZETT): 397 MS
EKG QTC CALCULATION (BAZETT): 463 MS
EKG R AXIS: 47 DEGREES
EKG R AXIS: 66 DEGREES
EKG T AXIS: 115 DEGREES
EKG T AXIS: 70 DEGREES
EKG VENTRICULAR RATE: 62 BPM
EKG VENTRICULAR RATE: 74 BPM
EOSINOPHILS ABSOLUTE: 0.1 K/UL (ref 0–0.7)
EOSINOPHILS RELATIVE PERCENT: 2.2 %
GFR AFRICAN AMERICAN: 55.3
GFR NON-AFRICAN AMERICAN: 45.7
GLUCOSE BLD-MCNC: 147 MG/DL (ref 60–115)
GLUCOSE BLD-MCNC: 151 MG/DL (ref 60–115)
GLUCOSE BLD-MCNC: 178 MG/DL (ref 60–115)
GLUCOSE BLD-MCNC: 221 MG/DL (ref 60–115)
GLUCOSE BLD-MCNC: 237 MG/DL (ref 74–109)
GLUCOSE BLD-MCNC: 55 MG/DL (ref 60–115)
HCT VFR BLD CALC: 36.1 % (ref 42–52)
HEMOGLOBIN: 11.5 G/DL (ref 14–18)
LV EF: 50 %
LVEF MODALITY: NORMAL
LYMPHOCYTES ABSOLUTE: 1.2 K/UL (ref 1–4.8)
LYMPHOCYTES RELATIVE PERCENT: 18 %
MAGNESIUM: 1.9 MG/DL (ref 1.7–2.3)
MCH RBC QN AUTO: 26.7 PG (ref 27–31.3)
MCHC RBC AUTO-ENTMCNC: 31.9 % (ref 33–37)
MCV RBC AUTO: 83.6 FL (ref 80–100)
MONOCYTES ABSOLUTE: 0.6 K/UL (ref 0.2–0.8)
MONOCYTES RELATIVE PERCENT: 8.6 %
NEUTROPHILS ABSOLUTE: 4.9 K/UL (ref 1.4–6.5)
NEUTROPHILS RELATIVE PERCENT: 70.3 %
PDW BLD-RTO: 17.8 % (ref 11.5–14.5)
PERFORMED ON: ABNORMAL
PLATELET # BLD: 158 K/UL (ref 130–400)
POTASSIUM SERPL-SCNC: 4.7 MEQ/L (ref 3.5–5.1)
PRO-BNP: 1228 PG/ML
RBC # BLD: 4.32 M/UL (ref 4.7–6.1)
SODIUM BLD-SCNC: 142 MEQ/L (ref 132–144)
TOTAL CK: 127 U/L (ref 0–190)
TROPONIN: <0.01 NG/ML (ref 0–0.01)
WBC # BLD: 6.9 K/UL (ref 4.8–10.8)

## 2018-09-28 PROCEDURE — 6370000000 HC RX 637 (ALT 250 FOR IP): Performed by: INTERNAL MEDICINE

## 2018-09-28 PROCEDURE — 84484 ASSAY OF TROPONIN QUANT: CPT

## 2018-09-28 PROCEDURE — 96376 TX/PRO/DX INJ SAME DRUG ADON: CPT

## 2018-09-28 PROCEDURE — 93005 ELECTROCARDIOGRAM TRACING: CPT

## 2018-09-28 PROCEDURE — 6360000002 HC RX W HCPCS: Performed by: PHYSICIAN ASSISTANT

## 2018-09-28 PROCEDURE — 6360000002 HC RX W HCPCS: Performed by: INTERNAL MEDICINE

## 2018-09-28 PROCEDURE — 82550 ASSAY OF CK (CPK): CPT

## 2018-09-28 PROCEDURE — 96372 THER/PROPH/DIAG INJ SC/IM: CPT

## 2018-09-28 PROCEDURE — 2700000000 HC OXYGEN THERAPY PER DAY

## 2018-09-28 PROCEDURE — 85025 COMPLETE CBC W/AUTO DIFF WBC: CPT

## 2018-09-28 PROCEDURE — 93010 ELECTROCARDIOGRAM REPORT: CPT | Performed by: INTERNAL MEDICINE

## 2018-09-28 PROCEDURE — G0008 ADMIN INFLUENZA VIRUS VAC: HCPCS | Performed by: INTERNAL MEDICINE

## 2018-09-28 PROCEDURE — 2580000003 HC RX 258: Performed by: PHYSICIAN ASSISTANT

## 2018-09-28 PROCEDURE — 2060000000 HC ICU INTERMEDIATE R&B

## 2018-09-28 PROCEDURE — 80048 BASIC METABOLIC PNL TOTAL CA: CPT

## 2018-09-28 PROCEDURE — 93306 TTE W/DOPPLER COMPLETE: CPT

## 2018-09-28 PROCEDURE — 83735 ASSAY OF MAGNESIUM: CPT

## 2018-09-28 PROCEDURE — 82553 CREATINE MB FRACTION: CPT

## 2018-09-28 PROCEDURE — 90686 IIV4 VACC NO PRSV 0.5 ML IM: CPT | Performed by: INTERNAL MEDICINE

## 2018-09-28 PROCEDURE — 83880 ASSAY OF NATRIURETIC PEPTIDE: CPT

## 2018-09-28 PROCEDURE — 36415 COLL VENOUS BLD VENIPUNCTURE: CPT

## 2018-09-28 RX ORDER — CARVEDILOL 12.5 MG/1
12.5 TABLET ORAL 2 TIMES DAILY WITH MEALS
Status: DISCONTINUED | OUTPATIENT
Start: 2018-09-28 | End: 2018-09-30 | Stop reason: HOSPADM

## 2018-09-28 RX ORDER — LOSARTAN POTASSIUM 50 MG/1
50 TABLET ORAL DAILY
Status: DISCONTINUED | OUTPATIENT
Start: 2018-09-29 | End: 2018-09-30 | Stop reason: HOSPADM

## 2018-09-28 RX ADMIN — INSULIN LISPRO 2 UNITS: 100 INJECTION, SOLUTION INTRAVENOUS; SUBCUTANEOUS at 16:38

## 2018-09-28 RX ADMIN — FINASTERIDE 5 MG: 5 TABLET, FILM COATED ORAL at 21:04

## 2018-09-28 RX ADMIN — NITROGLYCERIN 0.5 INCH: 20 OINTMENT TOPICAL at 00:30

## 2018-09-28 RX ADMIN — INFLUENZA A VIRUS A/MICHIGAN/45/2015 X-275 (H1N1) ANTIGEN (FORMALDEHYDE INACTIVATED), INFLUENZA A VIRUS A/SINGAPORE/INFIMH-16-0019/2016 IVR-186 (H3N2) ANTIGEN (FORMALDEHYDE INACTIVATED), INFLUENZA B VIRUS B/PHUKET/3073/2013 ANTIGEN (FORMALDEHYDE INACTIVATED), AND INFLUENZA B VIRUS B/MARYLAND/15/2016 BX-69A ANTIGEN (FORMALDEHYDE INACTIVATED) 0.5 ML: 15; 15; 15; 15 INJECTION, SUSPENSION INTRAMUSCULAR at 09:22

## 2018-09-28 RX ADMIN — CARVEDILOL 3.12 MG: 3.12 TABLET, FILM COATED ORAL at 09:30

## 2018-09-28 RX ADMIN — Medication 10 ML: at 09:31

## 2018-09-28 RX ADMIN — NITROGLYCERIN 0.5 INCH: 20 OINTMENT TOPICAL at 05:56

## 2018-09-28 RX ADMIN — NITROGLYCERIN 0.5 INCH: 20 OINTMENT TOPICAL at 11:35

## 2018-09-28 RX ADMIN — ATORVASTATIN CALCIUM 80 MG: 80 TABLET, FILM COATED ORAL at 20:15

## 2018-09-28 RX ADMIN — INSULIN LISPRO 4 UNITS: 100 INJECTION, SOLUTION INTRAVENOUS; SUBCUTANEOUS at 09:33

## 2018-09-28 RX ADMIN — INSULIN LISPRO 2 UNITS: 100 INJECTION, SOLUTION INTRAVENOUS; SUBCUTANEOUS at 11:35

## 2018-09-28 RX ADMIN — Medication 10 ML: at 20:15

## 2018-09-28 RX ADMIN — TORSEMIDE 50 MG: 10 TABLET ORAL at 12:50

## 2018-09-28 RX ADMIN — PANTOPRAZOLE SODIUM 40 MG: 40 TABLET, DELAYED RELEASE ORAL at 05:55

## 2018-09-28 RX ADMIN — FUROSEMIDE 20 MG: 10 INJECTION, SOLUTION INTRAVENOUS at 09:30

## 2018-09-28 RX ADMIN — ENOXAPARIN SODIUM 40 MG: 40 INJECTION SUBCUTANEOUS at 09:31

## 2018-09-28 RX ADMIN — ASPIRIN 81 MG: 81 TABLET, CHEWABLE ORAL at 09:31

## 2018-09-28 RX ADMIN — CARVEDILOL 12.5 MG: 12.5 TABLET, FILM COATED ORAL at 16:25

## 2018-09-28 RX ADMIN — LOSARTAN POTASSIUM 25 MG: 25 TABLET ORAL at 09:31

## 2018-09-28 RX ADMIN — INSULIN GLARGINE 40 UNITS: 100 INJECTION, SOLUTION SUBCUTANEOUS at 22:28

## 2018-09-28 ASSESSMENT — PAIN SCALES - GENERAL
PAINLEVEL_OUTOF10: 0

## 2018-09-29 LAB
ANION GAP SERPL CALCULATED.3IONS-SCNC: 17 MEQ/L (ref 7–13)
BASOPHILS ABSOLUTE: 0.1 K/UL (ref 0–0.2)
BASOPHILS RELATIVE PERCENT: 0.9 %
BUN BLDV-MCNC: 42 MG/DL (ref 8–23)
CALCIUM SERPL-MCNC: 9.5 MG/DL (ref 8.6–10.2)
CHLORIDE BLD-SCNC: 96 MEQ/L (ref 98–107)
CO2: 28 MEQ/L (ref 22–29)
CREAT SERPL-MCNC: 2.01 MG/DL (ref 0.7–1.2)
EOSINOPHILS ABSOLUTE: 0.3 K/UL (ref 0–0.7)
EOSINOPHILS RELATIVE PERCENT: 3.4 %
GFR AFRICAN AMERICAN: 38.2
GFR NON-AFRICAN AMERICAN: 31.6
GLUCOSE BLD-MCNC: 128 MG/DL (ref 74–109)
GLUCOSE BLD-MCNC: 136 MG/DL (ref 60–115)
GLUCOSE BLD-MCNC: 150 MG/DL (ref 60–115)
GLUCOSE BLD-MCNC: 235 MG/DL (ref 60–115)
GLUCOSE BLD-MCNC: 303 MG/DL (ref 60–115)
HCT VFR BLD CALC: 38 % (ref 42–52)
HEMOGLOBIN: 12.7 G/DL (ref 14–18)
LYMPHOCYTES ABSOLUTE: 2 K/UL (ref 1–4.8)
LYMPHOCYTES RELATIVE PERCENT: 22.9 %
MAGNESIUM: 1.9 MG/DL (ref 1.7–2.3)
MCH RBC QN AUTO: 27.6 PG (ref 27–31.3)
MCHC RBC AUTO-ENTMCNC: 33.5 % (ref 33–37)
MCV RBC AUTO: 82.3 FL (ref 80–100)
MONOCYTES ABSOLUTE: 0.9 K/UL (ref 0.2–0.8)
MONOCYTES RELATIVE PERCENT: 9.7 %
NEUTROPHILS ABSOLUTE: 5.6 K/UL (ref 1.4–6.5)
NEUTROPHILS RELATIVE PERCENT: 63.1 %
PDW BLD-RTO: 17.5 % (ref 11.5–14.5)
PERFORMED ON: ABNORMAL
PLATELET # BLD: 197 K/UL (ref 130–400)
POTASSIUM SERPL-SCNC: 3.9 MEQ/L (ref 3.5–5.1)
RBC # BLD: 4.61 M/UL (ref 4.7–6.1)
SODIUM BLD-SCNC: 141 MEQ/L (ref 132–144)
WBC # BLD: 8.9 K/UL (ref 4.8–10.8)

## 2018-09-29 PROCEDURE — 2580000003 HC RX 258: Performed by: PHYSICIAN ASSISTANT

## 2018-09-29 PROCEDURE — 36415 COLL VENOUS BLD VENIPUNCTURE: CPT

## 2018-09-29 PROCEDURE — 80048 BASIC METABOLIC PNL TOTAL CA: CPT

## 2018-09-29 PROCEDURE — 83735 ASSAY OF MAGNESIUM: CPT

## 2018-09-29 PROCEDURE — 2700000000 HC OXYGEN THERAPY PER DAY

## 2018-09-29 PROCEDURE — 6370000000 HC RX 637 (ALT 250 FOR IP): Performed by: INTERNAL MEDICINE

## 2018-09-29 PROCEDURE — 2060000000 HC ICU INTERMEDIATE R&B

## 2018-09-29 PROCEDURE — 85025 COMPLETE CBC W/AUTO DIFF WBC: CPT

## 2018-09-29 RX ORDER — INSULIN GLARGINE 100 [IU]/ML
40 INJECTION, SOLUTION SUBCUTANEOUS NIGHTLY
Qty: 1 VIAL | Refills: 3 | Status: SHIPPED | OUTPATIENT
Start: 2018-09-29

## 2018-09-29 RX ADMIN — APIXABAN 2.5 MG: 2.5 TABLET, FILM COATED ORAL at 20:06

## 2018-09-29 RX ADMIN — PANTOPRAZOLE SODIUM 40 MG: 40 TABLET, DELAYED RELEASE ORAL at 05:35

## 2018-09-29 RX ADMIN — Medication 10 ML: at 10:27

## 2018-09-29 RX ADMIN — Medication 10 ML: at 20:13

## 2018-09-29 RX ADMIN — ATORVASTATIN CALCIUM 80 MG: 80 TABLET, FILM COATED ORAL at 20:06

## 2018-09-29 RX ADMIN — ASPIRIN 81 MG: 81 TABLET, CHEWABLE ORAL at 10:25

## 2018-09-29 RX ADMIN — INSULIN GLARGINE 40 UNITS: 100 INJECTION, SOLUTION SUBCUTANEOUS at 20:14

## 2018-09-29 RX ADMIN — CARVEDILOL 12.5 MG: 12.5 TABLET, FILM COATED ORAL at 17:02

## 2018-09-29 RX ADMIN — INSULIN LISPRO 4 UNITS: 100 INJECTION, SOLUTION INTRAVENOUS; SUBCUTANEOUS at 17:03

## 2018-09-29 RX ADMIN — CARVEDILOL 12.5 MG: 12.5 TABLET, FILM COATED ORAL at 10:26

## 2018-09-29 RX ADMIN — INSULIN LISPRO 8 UNITS: 100 INJECTION, SOLUTION INTRAVENOUS; SUBCUTANEOUS at 13:09

## 2018-09-29 RX ADMIN — FINASTERIDE 5 MG: 5 TABLET, FILM COATED ORAL at 16:43

## 2018-09-29 RX ADMIN — APIXABAN 2.5 MG: 2.5 TABLET, FILM COATED ORAL at 12:54

## 2018-09-30 VITALS
BODY MASS INDEX: 28.63 KG/M2 | OXYGEN SATURATION: 99 % | SYSTOLIC BLOOD PRESSURE: 133 MMHG | WEIGHT: 199.96 LBS | DIASTOLIC BLOOD PRESSURE: 39 MMHG | HEART RATE: 48 BPM | TEMPERATURE: 98 F | HEIGHT: 70 IN | RESPIRATION RATE: 18 BRPM

## 2018-09-30 LAB
ANION GAP SERPL CALCULATED.3IONS-SCNC: 10 MEQ/L (ref 7–13)
BASOPHILS ABSOLUTE: 0.1 K/UL (ref 0–0.2)
BASOPHILS RELATIVE PERCENT: 1 %
BUN BLDV-MCNC: 48 MG/DL (ref 8–23)
CALCIUM SERPL-MCNC: 9.1 MG/DL (ref 8.6–10.2)
CHLORIDE BLD-SCNC: 97 MEQ/L (ref 98–107)
CO2: 32 MEQ/L (ref 22–29)
CREAT SERPL-MCNC: 2.03 MG/DL (ref 0.7–1.2)
EOSINOPHILS ABSOLUTE: 0.2 K/UL (ref 0–0.7)
EOSINOPHILS RELATIVE PERCENT: 3.1 %
GFR AFRICAN AMERICAN: 37.8
GFR NON-AFRICAN AMERICAN: 31.2
GLUCOSE BLD-MCNC: 111 MG/DL (ref 60–115)
GLUCOSE BLD-MCNC: 125 MG/DL (ref 74–109)
GLUCOSE BLD-MCNC: 288 MG/DL (ref 60–115)
HCT VFR BLD CALC: 37 % (ref 42–52)
HEMOGLOBIN: 12.2 G/DL (ref 14–18)
LYMPHOCYTES ABSOLUTE: 1.8 K/UL (ref 1–4.8)
LYMPHOCYTES RELATIVE PERCENT: 22.8 %
MAGNESIUM: 1.9 MG/DL (ref 1.7–2.3)
MCH RBC QN AUTO: 27.2 PG (ref 27–31.3)
MCHC RBC AUTO-ENTMCNC: 33 % (ref 33–37)
MCV RBC AUTO: 82.5 FL (ref 80–100)
MONOCYTES ABSOLUTE: 0.9 K/UL (ref 0.2–0.8)
MONOCYTES RELATIVE PERCENT: 11.5 %
NEUTROPHILS ABSOLUTE: 4.8 K/UL (ref 1.4–6.5)
NEUTROPHILS RELATIVE PERCENT: 61.6 %
PDW BLD-RTO: 17.4 % (ref 11.5–14.5)
PERFORMED ON: ABNORMAL
PERFORMED ON: NORMAL
PLATELET # BLD: 162 K/UL (ref 130–400)
POTASSIUM SERPL-SCNC: 4 MEQ/L (ref 3.5–5.1)
RBC # BLD: 4.49 M/UL (ref 4.7–6.1)
SODIUM BLD-SCNC: 139 MEQ/L (ref 132–144)
WBC # BLD: 7.7 K/UL (ref 4.8–10.8)

## 2018-09-30 PROCEDURE — 85025 COMPLETE CBC W/AUTO DIFF WBC: CPT

## 2018-09-30 PROCEDURE — 6370000000 HC RX 637 (ALT 250 FOR IP): Performed by: INTERNAL MEDICINE

## 2018-09-30 PROCEDURE — 80048 BASIC METABOLIC PNL TOTAL CA: CPT

## 2018-09-30 PROCEDURE — 83735 ASSAY OF MAGNESIUM: CPT

## 2018-09-30 PROCEDURE — 36415 COLL VENOUS BLD VENIPUNCTURE: CPT

## 2018-09-30 RX ORDER — LOSARTAN POTASSIUM 50 MG/1
50 TABLET ORAL DAILY
Qty: 30 TABLET | Refills: 3 | Status: SHIPPED | OUTPATIENT
Start: 2018-10-01

## 2018-09-30 RX ORDER — TORSEMIDE 20 MG/1
40 TABLET ORAL DAILY
Qty: 60 TABLET | Refills: 3 | Status: SHIPPED | OUTPATIENT
Start: 2018-10-01

## 2018-09-30 RX ORDER — CARVEDILOL 12.5 MG/1
12.5 TABLET ORAL 2 TIMES DAILY WITH MEALS
Qty: 60 TABLET | Refills: 3 | Status: SHIPPED | OUTPATIENT
Start: 2018-09-30

## 2018-09-30 RX ORDER — TORSEMIDE 20 MG/1
40 TABLET ORAL DAILY
Status: DISCONTINUED | OUTPATIENT
Start: 2018-10-01 | End: 2018-09-30 | Stop reason: HOSPADM

## 2018-09-30 RX ADMIN — FINASTERIDE 5 MG: 5 TABLET, FILM COATED ORAL at 12:55

## 2018-09-30 RX ADMIN — PANTOPRAZOLE SODIUM 40 MG: 40 TABLET, DELAYED RELEASE ORAL at 07:00

## 2018-09-30 RX ADMIN — INSULIN LISPRO 6 UNITS: 100 INJECTION, SOLUTION INTRAVENOUS; SUBCUTANEOUS at 12:57

## 2018-09-30 RX ADMIN — ASPIRIN 81 MG: 81 TABLET, CHEWABLE ORAL at 10:57

## 2018-09-30 RX ADMIN — CARVEDILOL 12.5 MG: 12.5 TABLET, FILM COATED ORAL at 10:57

## 2018-09-30 RX ADMIN — LOSARTAN POTASSIUM 50 MG: 50 TABLET ORAL at 10:57

## 2018-09-30 RX ADMIN — APIXABAN 2.5 MG: 2.5 TABLET, FILM COATED ORAL at 10:57

## 2018-09-30 ASSESSMENT — ENCOUNTER SYMPTOMS
DIARRHEA: 0
VOMITING: 0
COUGH: 0
NAUSEA: 0
SHORTNESS OF BREATH: 0

## 2018-09-30 ASSESSMENT — PAIN SCALES - GENERAL
PAINLEVEL_OUTOF10: 0
PAINLEVEL_OUTOF10: 0

## 2018-10-01 ENCOUNTER — TELEPHONE (OUTPATIENT)
Dept: FAMILY MEDICINE CLINIC | Age: 83
End: 2018-10-01

## 2018-10-01 NOTE — TELEPHONE ENCOUNTER
[facility] Transitions Initial Follow Up Call    Outreach made within 2 business days of discharge: Yes    Patient: Jesús Centeno Patient : 1931   MRN: [id number]  Reason for Admission:CHF  Discharge Date: 18       Spoke with: Renato Johansen  He states he feels great, just a little tired. He has filled all medications and is taking them as directed. He has no complaints of pain at this time. Discharge department/facility: [address] [place] [address] Interactive Patient Contact:  Was patient able to fill all prescriptions: Yes  Was patient instructed to bring all medications to the follow-up visit: Yes  Is patient taking all medications as directed in the discharge summary?  Yes  Does patient understand their discharge instructions: Yes  Does patient have questions or concerns that need addressed prior to 7-14 day follow up office visit: no    Scheduled appointment with PCP within 7-14 days    Follow Up  Future Appointments  Date Time Provider Gabriella Chappell   10/12/2018 1:00 PM Dio Myrick MD [address] [address]   2019 9:45 AM Lizbet Limon  [address]

## 2018-10-02 ENCOUNTER — CARE COORDINATION (OUTPATIENT)
Dept: CASE MANAGEMENT | Age: 83
End: 2018-10-02

## 2018-10-04 ENCOUNTER — CARE COORDINATION (OUTPATIENT)
Dept: CASE MANAGEMENT | Age: 83
End: 2018-10-04

## 2018-10-12 ENCOUNTER — OFFICE VISIT (OUTPATIENT)
Dept: FAMILY MEDICINE CLINIC | Age: 83
End: 2018-10-12
Payer: MEDICARE

## 2018-10-12 ENCOUNTER — TELEPHONE (OUTPATIENT)
Dept: FAMILY MEDICINE CLINIC | Age: 83
End: 2018-10-12

## 2018-10-12 VITALS
SYSTOLIC BLOOD PRESSURE: 154 MMHG | OXYGEN SATURATION: 98 % | WEIGHT: 198.5 LBS | BODY MASS INDEX: 29.4 KG/M2 | RESPIRATION RATE: 14 BRPM | TEMPERATURE: 98.1 F | HEIGHT: 69 IN | DIASTOLIC BLOOD PRESSURE: 86 MMHG | HEART RATE: 54 BPM

## 2018-10-12 DIAGNOSIS — I50.9 CONGESTIVE HEART FAILURE, UNSPECIFIED HF CHRONICITY, UNSPECIFIED HEART FAILURE TYPE (HCC): ICD-10-CM

## 2018-10-12 DIAGNOSIS — E11.59 TYPE 2 DIABETES MELLITUS WITH OTHER CIRCULATORY COMPLICATIONS (HCC): ICD-10-CM

## 2018-10-12 DIAGNOSIS — N18.30 CKD (CHRONIC KIDNEY DISEASE) STAGE 3, GFR 30-59 ML/MIN (HCC): ICD-10-CM

## 2018-10-12 DIAGNOSIS — Z09 HOSPITAL DISCHARGE FOLLOW-UP: Primary | ICD-10-CM

## 2018-10-12 DIAGNOSIS — I10 ESSENTIAL HYPERTENSION: ICD-10-CM

## 2018-10-12 PROCEDURE — 99495 TRANSJ CARE MGMT MOD F2F 14D: CPT | Performed by: FAMILY MEDICINE

## 2018-10-12 NOTE — PROGRESS NOTES
Post-Discharge Transitional Care Management Services or Hospital Follow Up      Lyndsey Felder   YOB: 1931    Date of Office Visit:  10/12/2018  Date of Hospital Admission: 9/27/18  Date of Hospital Discharge: 9/30/18  Risk of hospital readmission (high >=14%.  Medium >=10%) :Readmission Risk Score: 14    Care management risk score Rising risk (score 2-5) and Complex Care (Scores >=6): 3     Non face to face  following discharge, date last encounter closed (first attempt may have been earlier): 10/1/2018  2:22 PM    Call initiated 2 business days of discharge: Yes    Patient Active Problem List   Diagnosis    Hyperlipidemia    CAD (coronary artery disease)    Hypertension    CHF (congestive heart failure), NYHA class I, acute on chronic, combined ([facility] [address]    CKD stage 3 due to type 2 diabetes mellitus ([facility] [address]    Coronary artery disease involving native coronary artery of native heart without angina pectoris    Paroxysmal atrial fibrillation ([facility] [address]    Essential hypertension    [facility]       No Known Allergies    Medications listed as ordered at the time of discharge from hospital  Reviewed allergy, medical, social, surgical, family and med list changes and updated   Files  Social History     Social History    Marital status:      Spouse name: N/A    Number of children: N/A    Years of education: N/A     Social History Main Topics    Smoking status: Never Smoker    Smokeless tobacco: Never Used    Alcohol use No    Drug use: No    Sexual activity: No     Other Topics Concern    None     Social History Narrative    None     Current Outpatient Prescriptions   Medication Sig Dispense Refill    apixaban (ELIQUIS) 2.5 MG TABS tablet Take 1 tablet by mouth 2 times daily 60 tablet 3    losartan (COZAAR) 50 MG tablet Take 1 tablet by mouth daily 30 tablet 3    torsemide (DEMADEX) 20 MG tablet Take 2 tablets by mouth daily 60 tablet 3    carvedilol (COREG) 12.5 Panel     Standing Status:   Future     Standing Expiration Date:   10/12/2019     Order Specific Question:   Is Patient Fasting?/# of Hours     Answer:   9    Basic Metabolic Panel     Standing Status:   Future     Standing Expiration Date:   10/12/2019   f/u with cardiology as already planned as does have stress test planned later this month   Fasting blood work soon and f/u after done

## 2018-10-23 ENCOUNTER — CARE COORDINATION (OUTPATIENT)
Dept: CASE MANAGEMENT | Age: 83
End: 2018-10-23

## 2018-10-23 NOTE — CARE COORDINATION
Care Transition Nurse phoned patient for update on condition. There was no answer. Left message informing patient of purpose of call and requested a return call.

## 2018-10-25 DIAGNOSIS — N18.30 CKD (CHRONIC KIDNEY DISEASE) STAGE 3, GFR 30-59 ML/MIN (HCC): ICD-10-CM

## 2018-10-25 DIAGNOSIS — I10 ESSENTIAL HYPERTENSION: ICD-10-CM

## 2018-10-25 DIAGNOSIS — E11.59 TYPE 2 DIABETES MELLITUS WITH OTHER CIRCULATORY COMPLICATIONS (HCC): ICD-10-CM

## 2018-10-25 LAB
ANION GAP SERPL CALCULATED.3IONS-SCNC: 14 MEQ/L (ref 7–13)
BUN BLDV-MCNC: 40 MG/DL (ref 8–23)
CALCIUM SERPL-MCNC: 9.2 MG/DL (ref 8.6–10.2)
CHLORIDE BLD-SCNC: 104 MEQ/L (ref 98–107)
CHOLESTEROL, TOTAL: 134 MG/DL (ref 0–199)
CO2: 25 MEQ/L (ref 22–29)
CREAT SERPL-MCNC: 1.63 MG/DL (ref 0.7–1.2)
GFR AFRICAN AMERICAN: 48.7
GFR NON-AFRICAN AMERICAN: 40.2
GLUCOSE BLD-MCNC: 178 MG/DL (ref 74–109)
HBA1C MFR BLD: 9.4 % (ref 4.8–5.9)
HDLC SERPL-MCNC: 39 MG/DL (ref 40–59)
LDL CHOLESTEROL CALCULATED: 75 MG/DL (ref 0–129)
POTASSIUM SERPL-SCNC: 5.1 MEQ/L (ref 3.5–5.1)
SODIUM BLD-SCNC: 143 MEQ/L (ref 132–144)
TRIGL SERPL-MCNC: 102 MG/DL (ref 0–200)

## 2018-10-30 ENCOUNTER — TELEPHONE (OUTPATIENT)
Dept: FAMILY MEDICINE CLINIC | Age: 83
End: 2018-10-30

## 2018-10-30 ENCOUNTER — OFFICE VISIT (OUTPATIENT)
Dept: FAMILY MEDICINE CLINIC | Age: 83
End: 2018-10-30
Payer: MEDICARE

## 2018-10-30 VITALS
SYSTOLIC BLOOD PRESSURE: 136 MMHG | BODY MASS INDEX: 29.18 KG/M2 | HEART RATE: 67 BPM | RESPIRATION RATE: 14 BRPM | OXYGEN SATURATION: 98 % | HEIGHT: 69 IN | WEIGHT: 197 LBS | TEMPERATURE: 97.6 F | DIASTOLIC BLOOD PRESSURE: 76 MMHG

## 2018-10-30 DIAGNOSIS — N18.30 CKD (CHRONIC KIDNEY DISEASE) STAGE 3, GFR 30-59 ML/MIN (HCC): ICD-10-CM

## 2018-10-30 DIAGNOSIS — E11.59 TYPE 2 DIABETES MELLITUS WITH OTHER CIRCULATORY COMPLICATIONS (HCC): Primary | ICD-10-CM

## 2018-10-30 PROCEDURE — G8427 DOCREV CUR MEDS BY ELIG CLIN: HCPCS | Performed by: FAMILY MEDICINE

## 2018-10-30 PROCEDURE — 99213 OFFICE O/P EST LOW 20 MIN: CPT | Performed by: FAMILY MEDICINE

## 2018-10-30 PROCEDURE — G8482 FLU IMMUNIZE ORDER/ADMIN: HCPCS | Performed by: FAMILY MEDICINE

## 2018-10-30 PROCEDURE — 1111F DSCHRG MED/CURRENT MED MERGE: CPT | Performed by: FAMILY MEDICINE

## 2018-10-30 PROCEDURE — G8598 ASA/ANTIPLAT THER USED: HCPCS | Performed by: FAMILY MEDICINE

## 2018-10-30 PROCEDURE — G8417 CALC BMI ABV UP PARAM F/U: HCPCS | Performed by: FAMILY MEDICINE

## 2018-10-30 PROCEDURE — 1123F ACP DISCUSS/DSCN MKR DOCD: CPT | Performed by: FAMILY MEDICINE

## 2018-10-30 PROCEDURE — 1101F PT FALLS ASSESS-DOCD LE1/YR: CPT | Performed by: FAMILY MEDICINE

## 2018-10-30 PROCEDURE — 1036F TOBACCO NON-USER: CPT | Performed by: FAMILY MEDICINE

## 2018-10-30 PROCEDURE — 4040F PNEUMOC VAC/ADMIN/RCVD: CPT | Performed by: FAMILY MEDICINE

## 2018-10-30 ASSESSMENT — ENCOUNTER SYMPTOMS
ABDOMINAL PAIN: 0
SHORTNESS OF BREATH: 0

## 2018-10-30 NOTE — PROGRESS NOTES
Subjective:      Patient ID: Arnie Mcmillan is a 80 y.o. male    HPI  Here in follow up for diabetes. Taking lantus 40 units daily and novolog 15 units with dinner. Review of Systems   Constitutional: Negative for activity change, appetite change and unexpected weight change. Respiratory: Negative for shortness of breath. Gastrointestinal: Negative for abdominal pain. Skin: Negative for rash. Neurological: Negative for dizziness. Reviewed allergy, medical, social, surgical, family and med list changes and updated   Files--reviewed blood work with elevated a1c  Social History     Social History    Marital status:      Spouse name: N/A    Number of children: N/A    Years of education: N/A     Social History Main Topics    Smoking status: Never Smoker    Smokeless tobacco: Never Used    Alcohol use No    Drug use: No    Sexual activity: No     Other Topics Concern    None     Social History Narrative    None     Current Outpatient Prescriptions   Medication Sig Dispense Refill    apixaban (ELIQUIS) 2.5 MG TABS tablet Take 1 tablet by mouth 2 times daily 60 tablet 3    losartan (COZAAR) 50 MG tablet Take 1 tablet by mouth daily 30 tablet 3    torsemide (DEMADEX) 20 MG tablet Take 2 tablets by mouth daily 60 tablet 3    carvedilol (COREG) 12.5 MG tablet Take 1 tablet by mouth 2 times daily (with meals) 60 tablet 3    insulin glargine (LANTUS) 100 UNIT/ML injection vial Inject 40 Units into the skin nightly 1 vial 3    Insulin Aspart (NOVOLOG FLEXPEN SC) Inject 15 Units into the skin Daily with supper      vitamin B-12 (CYANOCOBALAMIN) 1000 MCG tablet Take 1,000 mcg by mouth daily      nitroGLYCERIN (NITROSTAT) 0.4 MG SL tablet Place 0.4 mg under the tongue every 5 minutes as needed for Chest pain up to max of 3 total doses. If no relief after 1 dose, call 911.       atorvastatin (LIPITOR) 80 MG tablet Take 1 tablet by mouth daily (Patient taking differently: Take 80 mg by

## 2018-10-30 NOTE — PROGRESS NOTES
Subjective:      Patient ID: iFdel Sahu is a 80 y.o. male. HPI  Treatment Adherence:   Medication compliance:  {Desc; compliance:5303::\"compliant most of the time\"}  Diet compliance:  {Desc; compliance:5303::\"compliant most of the time\"}  Weight trend: {INCREASING/DECREASING/STABLE:36085}  Current exercise: {EXERCISE BTOV:795264756}  What might prevent you from meeting your goal?: {Barriers to success:34235}  Patient plan for overcoming barriers: {COMMENT/NA:618022923}     Patient Confidence: {NUMBERS 1-10:78899}/10      Diabetes Mellitus Type 2: Current symptoms/problems include {Symptoms; diabetes:45201::\"none\"}. Home blood sugar records:  {diabetes glucometry results:87557}  Any episodes of hypoglycemia? {yes***/no:99246}  Eye exam current (within one year): {yes/no/unknown:74}  Tobacco history: He  reports that he has never smoked. He has never used smokeless tobacco.   Daily Aspirin? {yes no:268464::\"Yes\"}  Known diabetic complications: {diabetes complications:1215}    Hypertension:  Home blood pressure monitoring: {NO/YES:1674622109::\"No\"}. He {is/is not:9024} adherent to a low sodium diet. Patient {denies/complains:65737} {Symptoms of Hypertension, Denies:45322}. Antihypertensive medication side effects: {Hypertension med side effects:5728::\"no medication side effects noted\"}. Use of agents associated with hypertension: {bp agents assoc with hypertension:511::\"none\"}. Hyperlipidemia:  No new myalgias or GI upset on {RP HYPERLIPIDEMIA MEDS:10714}.        Lab Results   Component Value Date    LABA1C 9.4 (H) 10/25/2018    LABA1C 9.4 (H) 12/28/2017    LABA1C 9.3 (H) 09/20/2017     Lab Results   Component Value Date    LABMICR 21.70 (H) 01/04/2018    CREATININE 1.63 (H) 10/25/2018     Lab Results   Component Value Date    ALT 19 09/27/2018    AST 20 09/27/2018     Lab Results   Component Value Date    CHOL 134 10/25/2018    TRIG 102 10/25/2018    HDL 39 (L) 10/25/2018    LDLCALC 75 10/25/2018

## 2018-11-07 DIAGNOSIS — N40.1 BPH WITH OBSTRUCTION/LOWER URINARY TRACT SYMPTOMS: Primary | ICD-10-CM

## 2018-11-07 DIAGNOSIS — N13.8 BPH WITH OBSTRUCTION/LOWER URINARY TRACT SYMPTOMS: Primary | ICD-10-CM

## 2018-11-07 RX ORDER — FINASTERIDE 5 MG/1
5 TABLET, FILM COATED ORAL DAILY
Qty: 90 TABLET | Refills: 3 | Status: SHIPPED | OUTPATIENT
Start: 2018-11-07 | End: 2019-06-27 | Stop reason: SDUPTHER

## 2018-12-11 ENCOUNTER — OFFICE VISIT (OUTPATIENT)
Dept: FAMILY MEDICINE CLINIC | Age: 83
End: 2018-12-11
Payer: MEDICARE

## 2018-12-11 VITALS
SYSTOLIC BLOOD PRESSURE: 130 MMHG | HEART RATE: 72 BPM | RESPIRATION RATE: 14 BRPM | WEIGHT: 194.3 LBS | TEMPERATURE: 97.4 F | BODY MASS INDEX: 28.78 KG/M2 | DIASTOLIC BLOOD PRESSURE: 70 MMHG | HEIGHT: 69 IN | OXYGEN SATURATION: 99 %

## 2018-12-11 DIAGNOSIS — E11.59 TYPE 2 DIABETES MELLITUS WITH OTHER CIRCULATORY COMPLICATIONS (HCC): Primary | ICD-10-CM

## 2018-12-11 PROCEDURE — G8417 CALC BMI ABV UP PARAM F/U: HCPCS | Performed by: FAMILY MEDICINE

## 2018-12-11 PROCEDURE — 1101F PT FALLS ASSESS-DOCD LE1/YR: CPT | Performed by: FAMILY MEDICINE

## 2018-12-11 PROCEDURE — 1036F TOBACCO NON-USER: CPT | Performed by: FAMILY MEDICINE

## 2018-12-11 PROCEDURE — 1123F ACP DISCUSS/DSCN MKR DOCD: CPT | Performed by: FAMILY MEDICINE

## 2018-12-11 PROCEDURE — G8427 DOCREV CUR MEDS BY ELIG CLIN: HCPCS | Performed by: FAMILY MEDICINE

## 2018-12-11 PROCEDURE — 99213 OFFICE O/P EST LOW 20 MIN: CPT | Performed by: FAMILY MEDICINE

## 2018-12-11 PROCEDURE — G8598 ASA/ANTIPLAT THER USED: HCPCS | Performed by: FAMILY MEDICINE

## 2018-12-11 PROCEDURE — 4040F PNEUMOC VAC/ADMIN/RCVD: CPT | Performed by: FAMILY MEDICINE

## 2018-12-11 PROCEDURE — G8482 FLU IMMUNIZE ORDER/ADMIN: HCPCS | Performed by: FAMILY MEDICINE

## 2018-12-11 ASSESSMENT — ENCOUNTER SYMPTOMS: SHORTNESS OF BREATH: 0

## 2019-03-08 DIAGNOSIS — E11.59 TYPE 2 DIABETES MELLITUS WITH OTHER CIRCULATORY COMPLICATIONS (HCC): ICD-10-CM

## 2019-03-08 LAB — HBA1C MFR BLD: 9.8 % (ref 4.8–5.9)

## 2019-03-12 ENCOUNTER — OFFICE VISIT (OUTPATIENT)
Dept: FAMILY MEDICINE CLINIC | Age: 84
End: 2019-03-12
Payer: MEDICARE

## 2019-03-12 VITALS
HEIGHT: 69 IN | TEMPERATURE: 97.7 F | DIASTOLIC BLOOD PRESSURE: 72 MMHG | BODY MASS INDEX: 28.29 KG/M2 | OXYGEN SATURATION: 95 % | RESPIRATION RATE: 14 BRPM | SYSTOLIC BLOOD PRESSURE: 134 MMHG | WEIGHT: 191 LBS | HEART RATE: 106 BPM

## 2019-03-12 DIAGNOSIS — R09.89 DECREASED PULSES IN FEET: ICD-10-CM

## 2019-03-12 DIAGNOSIS — E11.59 TYPE 2 DIABETES MELLITUS WITH OTHER CIRCULATORY COMPLICATIONS (HCC): Primary | ICD-10-CM

## 2019-03-12 DIAGNOSIS — E11.59 TYPE 2 DIABETES MELLITUS WITH OTHER CIRCULATORY COMPLICATIONS (HCC): ICD-10-CM

## 2019-03-12 DIAGNOSIS — I10 ESSENTIAL HYPERTENSION: ICD-10-CM

## 2019-03-12 DIAGNOSIS — E78.5 HYPERLIPIDEMIA, UNSPECIFIED HYPERLIPIDEMIA TYPE: ICD-10-CM

## 2019-03-12 LAB
CREATININE URINE: 21.6 MG/DL
MICROALBUMIN UR-MCNC: 2.7 MG/DL
MICROALBUMIN/CREAT UR-RTO: 125 MG/G (ref 0–30)

## 2019-03-12 PROCEDURE — G8427 DOCREV CUR MEDS BY ELIG CLIN: HCPCS | Performed by: FAMILY MEDICINE

## 2019-03-12 PROCEDURE — 1123F ACP DISCUSS/DSCN MKR DOCD: CPT | Performed by: FAMILY MEDICINE

## 2019-03-12 PROCEDURE — G8482 FLU IMMUNIZE ORDER/ADMIN: HCPCS | Performed by: FAMILY MEDICINE

## 2019-03-12 PROCEDURE — 1036F TOBACCO NON-USER: CPT | Performed by: FAMILY MEDICINE

## 2019-03-12 PROCEDURE — 99214 OFFICE O/P EST MOD 30 MIN: CPT | Performed by: FAMILY MEDICINE

## 2019-03-12 PROCEDURE — 4040F PNEUMOC VAC/ADMIN/RCVD: CPT | Performed by: FAMILY MEDICINE

## 2019-03-12 PROCEDURE — G8417 CALC BMI ABV UP PARAM F/U: HCPCS | Performed by: FAMILY MEDICINE

## 2019-03-12 PROCEDURE — G8598 ASA/ANTIPLAT THER USED: HCPCS | Performed by: FAMILY MEDICINE

## 2019-03-12 PROCEDURE — 1101F PT FALLS ASSESS-DOCD LE1/YR: CPT | Performed by: FAMILY MEDICINE

## 2019-03-12 ASSESSMENT — PATIENT HEALTH QUESTIONNAIRE - PHQ9
SUM OF ALL RESPONSES TO PHQ QUESTIONS 1-9: 0
2. FEELING DOWN, DEPRESSED OR HOPELESS: 0
1. LITTLE INTEREST OR PLEASURE IN DOING THINGS: 0
SUM OF ALL RESPONSES TO PHQ QUESTIONS 1-9: 0
SUM OF ALL RESPONSES TO PHQ9 QUESTIONS 1 & 2: 0

## 2019-03-12 ASSESSMENT — ENCOUNTER SYMPTOMS
NAUSEA: 0
ABDOMINAL PAIN: 0
SHORTNESS OF BREATH: 0
VOMITING: 0

## 2019-03-25 ENCOUNTER — OFFICE VISIT (OUTPATIENT)
Dept: INTERNAL MEDICINE CLINIC | Age: 84
End: 2019-03-25
Payer: MEDICARE

## 2019-03-25 VITALS
BODY MASS INDEX: 28.35 KG/M2 | DIASTOLIC BLOOD PRESSURE: 62 MMHG | OXYGEN SATURATION: 98 % | TEMPERATURE: 98 F | HEART RATE: 63 BPM | SYSTOLIC BLOOD PRESSURE: 118 MMHG | WEIGHT: 192 LBS | RESPIRATION RATE: 12 BRPM

## 2019-03-25 DIAGNOSIS — H65.91 FLUID LEVEL BEHIND TYMPANIC MEMBRANE OF RIGHT EAR: ICD-10-CM

## 2019-03-25 DIAGNOSIS — R06.02 SOB (SHORTNESS OF BREATH): Primary | ICD-10-CM

## 2019-03-25 DIAGNOSIS — I50.42 CHRONIC COMBINED SYSTOLIC AND DIASTOLIC CONGESTIVE HEART FAILURE (HCC): ICD-10-CM

## 2019-03-25 DIAGNOSIS — R05.9 COUGH: ICD-10-CM

## 2019-03-25 PROCEDURE — G8482 FLU IMMUNIZE ORDER/ADMIN: HCPCS | Performed by: PHYSICIAN ASSISTANT

## 2019-03-25 PROCEDURE — 1123F ACP DISCUSS/DSCN MKR DOCD: CPT | Performed by: PHYSICIAN ASSISTANT

## 2019-03-25 PROCEDURE — 1036F TOBACCO NON-USER: CPT | Performed by: PHYSICIAN ASSISTANT

## 2019-03-25 PROCEDURE — G8427 DOCREV CUR MEDS BY ELIG CLIN: HCPCS | Performed by: PHYSICIAN ASSISTANT

## 2019-03-25 PROCEDURE — 4040F PNEUMOC VAC/ADMIN/RCVD: CPT | Performed by: PHYSICIAN ASSISTANT

## 2019-03-25 PROCEDURE — G8417 CALC BMI ABV UP PARAM F/U: HCPCS | Performed by: PHYSICIAN ASSISTANT

## 2019-03-25 PROCEDURE — G8598 ASA/ANTIPLAT THER USED: HCPCS | Performed by: PHYSICIAN ASSISTANT

## 2019-03-25 PROCEDURE — 1101F PT FALLS ASSESS-DOCD LE1/YR: CPT | Performed by: PHYSICIAN ASSISTANT

## 2019-03-25 PROCEDURE — 99214 OFFICE O/P EST MOD 30 MIN: CPT | Performed by: PHYSICIAN ASSISTANT

## 2019-03-25 RX ORDER — LORATADINE 10 MG/1
10 TABLET ORAL DAILY
Qty: 30 TABLET | Refills: 2 | Status: SHIPPED | OUTPATIENT
Start: 2019-03-25 | End: 2019-06-27 | Stop reason: ALTCHOICE

## 2019-03-25 ASSESSMENT — ENCOUNTER SYMPTOMS
CHOKING: 0
SINUS PAIN: 0
EYES NEGATIVE: 1
ALLERGIC/IMMUNOLOGIC NEGATIVE: 1
SINUS PRESSURE: 0
WHEEZING: 0
GASTROINTESTINAL NEGATIVE: 1
COUGH: 1
SHORTNESS OF BREATH: 1

## 2019-03-28 ENCOUNTER — HOSPITAL ENCOUNTER (OUTPATIENT)
Dept: GENERAL RADIOLOGY | Age: 84
Discharge: HOME | End: 2019-03-30
Payer: MEDICARE

## 2019-03-28 DIAGNOSIS — R06.02 SOB (SHORTNESS OF BREATH): ICD-10-CM

## 2019-03-28 PROCEDURE — 71046 X-RAY EXAM CHEST 2 VIEWS: CPT

## 2019-04-22 DIAGNOSIS — N40.1 BPH WITH OBSTRUCTION/LOWER URINARY TRACT SYMPTOMS: ICD-10-CM

## 2019-04-22 DIAGNOSIS — R97.20 ELEVATED PSA: ICD-10-CM

## 2019-04-22 DIAGNOSIS — N13.8 BPH WITH OBSTRUCTION/LOWER URINARY TRACT SYMPTOMS: ICD-10-CM

## 2019-04-22 LAB — PROSTATE SPECIFIC ANTIGEN: 3.27 NG/ML (ref 0–6.22)

## 2019-04-25 ENCOUNTER — OFFICE VISIT (OUTPATIENT)
Dept: UROLOGY | Age: 84
End: 2019-04-25
Payer: MEDICARE

## 2019-04-25 VITALS
SYSTOLIC BLOOD PRESSURE: 136 MMHG | HEIGHT: 69 IN | DIASTOLIC BLOOD PRESSURE: 60 MMHG | WEIGHT: 189 LBS | HEART RATE: 59 BPM | BODY MASS INDEX: 27.99 KG/M2

## 2019-04-25 DIAGNOSIS — R97.20 ELEVATED PSA: Primary | ICD-10-CM

## 2019-04-25 DIAGNOSIS — N13.8 HYPERTROPHY OF PROSTATE WITH URINARY OBSTRUCTION: ICD-10-CM

## 2019-04-25 DIAGNOSIS — N40.1 HYPERTROPHY OF PROSTATE WITH URINARY OBSTRUCTION: ICD-10-CM

## 2019-04-25 PROCEDURE — G8417 CALC BMI ABV UP PARAM F/U: HCPCS | Performed by: UROLOGY

## 2019-04-25 PROCEDURE — G8427 DOCREV CUR MEDS BY ELIG CLIN: HCPCS | Performed by: UROLOGY

## 2019-04-25 PROCEDURE — 1123F ACP DISCUSS/DSCN MKR DOCD: CPT | Performed by: UROLOGY

## 2019-04-25 PROCEDURE — 99213 OFFICE O/P EST LOW 20 MIN: CPT | Performed by: UROLOGY

## 2019-04-25 PROCEDURE — G8598 ASA/ANTIPLAT THER USED: HCPCS | Performed by: UROLOGY

## 2019-04-25 PROCEDURE — 4040F PNEUMOC VAC/ADMIN/RCVD: CPT | Performed by: UROLOGY

## 2019-04-25 PROCEDURE — 1036F TOBACCO NON-USER: CPT | Performed by: UROLOGY

## 2019-04-25 ASSESSMENT — ENCOUNTER SYMPTOMS
ABDOMINAL PAIN: 0
ABDOMINAL DISTENTION: 0

## 2019-04-25 NOTE — PROGRESS NOTES
Subjective:      Patient ID: Vipul Leung is a 80 y.o. male. HPI  This is an 81 yo white male with h/o CAD/MI/Stents, DM, HTN, COPD, BPH w/LUTs on Proscar back in follow-up. Since last seen on 4/24/18, he denies hematuria, dysuria, urgency or frequency. He has no pain and no incontinence. He feels the bladder empties well. He has a good fos and no SE from Proscar reported. He has no interval new medical or surgical problems.  I reviewed his interval PSA today.      Past Medical History:   Diagnosis Date    CAD (coronary artery disease)     Hyperlipidemia     Hypertension     Type II or unspecified type diabetes mellitus without mention of complication, not stated as uncontrolled      Past Surgical History:   Procedure Laterality Date    PTCA      ROTATOR CUFF REPAIR       Social History     Socioeconomic History    Marital status:      Spouse name: None    Number of children: None    Years of education: None    Highest education level: None   Occupational History    None   Social Needs    Financial resource strain: None    Food insecurity:     Worry: None     Inability: None    Transportation needs:     Medical: None     Non-medical: None   Tobacco Use    Smoking status: Never Smoker    Smokeless tobacco: Never Used   Substance and Sexual Activity    Alcohol use: No    Drug use: No    Sexual activity: Never   Lifestyle    Physical activity:     Days per week: None     Minutes per session: None    Stress: None   Relationships    Social connections:     Talks on phone: None     Gets together: None     Attends [employer]ism service: None     Active member of club or organization: None     Attends meetings of clubs or organizations: None     Relationship status: None    Intimate partner violence:     Fear of current or ex partner: None     Emotionally abused: None     Physically abused: None     Forced sexual activity: None   Other Topics Concern    None   Social History Narrative    None     History reviewed. No pertinent family history. Current Outpatient Medications   Medication Sig Dispense Refill    loratadine (CLARITIN) 10 MG tablet Take 1 tablet by mouth daily 30 tablet 2    finasteride (PROSCAR) 5 MG tablet Take 1 tablet by mouth daily 90 tablet 3    SITagliptin (JANUVIA) 50 MG tablet Take 1 tablet by mouth daily 90 tablet 0    apixaban (ELIQUIS) 2.5 MG TABS tablet Take 1 tablet by mouth 2 times daily 60 tablet 3    losartan (COZAAR) 50 MG tablet Take 1 tablet by mouth daily 30 tablet 3    torsemide (DEMADEX) 20 MG tablet Take 2 tablets by mouth daily 60 tablet 3    carvedilol (COREG) 12.5 MG tablet Take 1 tablet by mouth 2 times daily (with meals) 60 tablet 3    insulin glargine (LANTUS) 100 UNIT/ML injection vial Inject 40 Units into the skin nightly (Patient taking differently: Inject 35 Units into the skin nightly ) 1 vial 3    Insulin Aspart (NOVOLOG FLEXPEN SC) Inject 15 Units into the skin Daily with supper      vitamin B-12 (CYANOCOBALAMIN) 1000 MCG tablet Take 1,000 mcg by mouth daily      nitroGLYCERIN (NITROSTAT) 0.4 MG SL tablet Place 0.4 mg under the tongue every 5 minutes as needed for Chest pain up to max of 3 total doses. If no relief after 1 dose, call 911.  atorvastatin (LIPITOR) 80 MG tablet Take 1 tablet by mouth daily (Patient taking differently: Take 80 mg by mouth daily Takes  At hs) 90 tablet 1    finasteride (PROSCAR) 5 MG tablet TAKE ONE TABLET BY MOUTH ONCE DAILY 90 tablet 3    omeprazole (PRILOSEC) 20 MG capsule Take 20 mg by mouth daily.  aspirin 81 MG tablet Take 81 mg by mouth daily. No current facility-administered medications for this visit. Patient has no known allergies. reviewed      Review of Systems   Constitutional: Negative for unexpected weight change. Cardiovascular: Negative for chest pain. Gastrointestinal: Negative for abdominal distention and abdominal pain.    Genitourinary: Negative for difficulty

## 2019-06-27 ENCOUNTER — OFFICE VISIT (OUTPATIENT)
Dept: FAMILY MEDICINE CLINIC | Age: 84
End: 2019-06-27
Payer: MEDICARE

## 2019-06-27 VITALS
OXYGEN SATURATION: 96 % | WEIGHT: 196.6 LBS | HEART RATE: 55 BPM | RESPIRATION RATE: 16 BRPM | BODY MASS INDEX: 29.12 KG/M2 | SYSTOLIC BLOOD PRESSURE: 116 MMHG | TEMPERATURE: 98 F | HEIGHT: 69 IN | DIASTOLIC BLOOD PRESSURE: 62 MMHG

## 2019-06-27 DIAGNOSIS — I25.10 CORONARY ARTERY DISEASE DUE TO LIPID RICH PLAQUE: ICD-10-CM

## 2019-06-27 DIAGNOSIS — I25.83 CORONARY ARTERY DISEASE DUE TO LIPID RICH PLAQUE: ICD-10-CM

## 2019-06-27 DIAGNOSIS — I10 ESSENTIAL HYPERTENSION: ICD-10-CM

## 2019-06-27 DIAGNOSIS — E11.8 TYPE 2 DIABETES MELLITUS WITH COMPLICATION, UNSPECIFIED WHETHER LONG TERM INSULIN USE: Primary | ICD-10-CM

## 2019-06-27 LAB — HBA1C MFR BLD: 10 %

## 2019-06-27 PROCEDURE — 99213 OFFICE O/P EST LOW 20 MIN: CPT | Performed by: PHYSICIAN ASSISTANT

## 2019-06-27 PROCEDURE — G8427 DOCREV CUR MEDS BY ELIG CLIN: HCPCS | Performed by: PHYSICIAN ASSISTANT

## 2019-06-27 PROCEDURE — 83036 HEMOGLOBIN GLYCOSYLATED A1C: CPT | Performed by: PHYSICIAN ASSISTANT

## 2019-06-27 PROCEDURE — 4040F PNEUMOC VAC/ADMIN/RCVD: CPT | Performed by: PHYSICIAN ASSISTANT

## 2019-06-27 PROCEDURE — 1123F ACP DISCUSS/DSCN MKR DOCD: CPT | Performed by: PHYSICIAN ASSISTANT

## 2019-06-27 PROCEDURE — 1036F TOBACCO NON-USER: CPT | Performed by: PHYSICIAN ASSISTANT

## 2019-06-27 PROCEDURE — G8417 CALC BMI ABV UP PARAM F/U: HCPCS | Performed by: PHYSICIAN ASSISTANT

## 2019-06-27 PROCEDURE — G8598 ASA/ANTIPLAT THER USED: HCPCS | Performed by: PHYSICIAN ASSISTANT

## 2019-06-27 ASSESSMENT — ENCOUNTER SYMPTOMS
SHORTNESS OF BREATH: 0
CONSTIPATION: 0
NAUSEA: 0
COUGH: 1
WHEEZING: 0
BACK PAIN: 0
VOMITING: 0
DIARRHEA: 0
SORE THROAT: 0
EYE PAIN: 0

## 2019-06-27 NOTE — PROGRESS NOTES
Subjective  Pawan Manish, 80 y.o. male presents today with:  Chief Complaint   Patient presents with   [address]     Former [facility] patient here to establish care. DM but is treated at [facility]     Declines         Treatment Adherence:   Medication compliance:  compliant all of the time  Diet compliance:  compliant most of the time  Weight trend: increasing  Current exercise: no regular exercise      Diabetes Mellitus Type 2: Current symptoms/problems include none. Home blood sugar records:  fasting range: 200s  Any episodes of hypoglycemia? no  Eye exam current (within one year): no  Tobacco history: He  reports that he has never smoked. He has never used smokeless tobacco.   Daily Aspirin? Yes  Known diabetic complications: none        Lab Results   Component Value Date    LABA1C 10.0 06/27/2019    LABA1C 9.8 (H) 03/08/2019    LABA1C 9.4 (H) 10/25/2018     Lab Results   Component Value Date    LABMICR 2.70 (H) 03/12/2019    CREATININE 1.63 (H) 10/25/2018     Lab Results   Component Value Date    ALT 19 09/27/2018    AST 20 09/27/2018     Lab Results   Component Value Date    CHOL 134 10/25/2018    TRIG 102 10/25/2018    HDL 39 (L) 10/25/2018    [address] 10/25/2018          HPI   Patient is here to establish care former patient of Mell galeas MD past medical history significant for insulin-dependent [facility] B[facility] by [facility] CAD and atrial fibrillation managed by José  Patient has no concerns today  Had recent labs thru [place] - results not available  - will request records    Review of Systems   Constitutional: Negative for fatigue. HENT: Negative for congestion and sore throat. Eyes: Negative for pain. Respiratory: Positive for cough. Negative for shortness of breath and wheezing. Gastrointestinal: Negative for constipation, diarrhea, nausea and vomiting. Genitourinary: Negative for dysuria.    Musculoskeletal: Negative for back pain and neck pain.   Skin: Negative for rash. Allergic/Immunologic: Negative for environmental allergies and food allergies. Neurological: Negative for dizziness and headaches. Psychiatric/Behavioral: Negative for sleep disturbance.          Past Medical History:   Diagnosis Date    Atrial fibrillation ([address]     CAD (coronary artery disease)     Hyperlipidemia     Hypertension     Type II or unspecified type diabetes mellitus without mention of complication, not stated as uncontrolled      Past Surgical History:   Procedure Laterality Date    CATARACT REMOVAL WITH IMPLANT Right     2013 - cathy walters    FOOT FRACTURE SURGERY  1980s    work related - fell off a ladder    PTCA  2008    4 stents - jill walters    ROTATOR CUFF REPAIR Right 2009     Social History     Socioeconomic History    Marital status:      Spouse name: Not on file    Number of children: Not on file    Years of education: Not on file    Highest education level: Not on file   Occupational History    Not on file   Social Needs    Financial resource strain: Not on file    Food insecurity:     Worry: Not on file     Inability: Not on file    Transportation needs:     Medical: Not on file     Non-medical: Not on file   Tobacco Use    Smoking status: Never Smoker    Smokeless tobacco: Never Used   Substance and Sexual Activity    Alcohol use: No    Drug use: No    Sexual activity: Never   Lifestyle    Physical activity:     Days per week: Not on file     Minutes per session: Not on file    Stress: Not on file   Relationships    Social connections:     Talks on phone: Not on file     Gets together: Not on file     Attends Yazidism service: Not on file     Active member of club or organization: Not on file     Attends meetings of clubs or organizations: Not on file     Relationship status: Not on file    Intimate partner violence:     Fear of current or ex partner: Not on file     Emotionally abused: Not on file     Physically abused: Not on file     Forced sexual activity: Not on file   Other Topics Concern    Not on file   Social History Narrative    Not on file     No family history on file. No Known Allergies     Current Outpatient Medications   Medication Sig Dispense Refill    apixaban (ELIQUIS) 2.5 MG TABS tablet Take 1 tablet by mouth 2 times daily 60 tablet 3    losartan (COZAAR) 50 MG tablet Take 1 tablet by mouth daily 30 tablet 3    torsemide (DEMADEX) 20 MG tablet Take 2 tablets by mouth daily 60 tablet 3    carvedilol (COREG) 12.5 MG tablet Take 1 tablet by mouth 2 times daily (with meals) 60 tablet 3    insulin glargine (LANTUS) 100 UNIT/ML injection vial Inject 40 Units into the skin nightly (Patient taking differently: Inject 35 Units into the skin nightly ) 1 vial 3    Insulin Aspart (NOVOLOG FLEXPEN SC) Inject 15 Units into the skin Daily with supper      vitamin B-12 (CYANOCOBALAMIN) 1000 MCG tablet Take 1,000 mcg by mouth daily      nitroGLYCERIN (NITROSTAT) 0.4 MG SL tablet Place 0.4 mg under the tongue every 5 minutes as needed for Chest pain up to max of 3 total doses. If no relief after 1 dose, call 911.  atorvastatin (LIPITOR) 80 MG tablet Take 1 tablet by mouth daily (Patient taking differently: Take 80 mg by mouth daily Takes  At hs) 90 tablet 1    finasteride (PROSCAR) 5 MG tablet TAKE ONE TABLET BY MOUTH ONCE DAILY 90 tablet 3    omeprazole (PRILOSEC) 20 MG capsule Take 20 mg by mouth daily.  aspirin 81 MG tablet Take 81 mg by mouth daily. No current facility-administered medications for this visit. Objective    Vitals:    06/27/19 1006   BP: 116/62   Site: Right Upper Arm   Position: Sitting   Cuff Size: Large Adult   Pulse: 55   Resp: 16   Temp: 98 °F (36.7 °C)   TempSrc: Oral   SpO2: 96%   Weight: 196 lb 9.6 oz (89.2 kg)   Height: 5' 9\" (1.753 m)     Physical Exam   Constitutional: He is oriented to person, place, and time. He appears well-developed and well-nourished.    HENT: Head: Normocephalic and atraumatic. Nose: Nose normal.   Mouth/Throat: Oropharynx is clear and moist.   Hearing aide   Eyes: Pupils are equal, round, and reactive to light. Conjunctivae and EOM are normal.   Strabismus left   Neck: Normal range of motion. Neck supple. No thyromegaly present. Cardiovascular: Normal rate, regular rhythm and normal heart sounds. No murmur heard. Pulmonary/Chest: Effort normal and breath sounds normal.   Abdominal: Soft. Bowel sounds are normal.   Musculoskeletal: Normal range of motion. He exhibits edema. Trace left le   Lymphadenopathy:     He has no cervical adenopathy. Neurological: He is alert and oriented to person, place, and time. Skin: Skin is warm and dry. Psychiatric: He has a normal mood and affect. Assessment & Plan    Diagnosis Orders   1. Type 2 diabetes mellitus with complication, unspecified whether long term insulin use (HCC)  POCT glycosylated hemoglobin (Hb A1C)    Increase NovoLog to 20 units with breakfast and dinner  Increase Lantus to 40 units at bedtime  Written instructions provided   2. Coronary artery disease due to lipid rich plaque     3. Essential hypertension      controlled         Orders Placed This Encounter   Procedures    POCT glycosylated hemoglobin (Hb A1C)     No orders of the defined types were placed in this encounter. Medications Discontinued During This Encounter   Medication Reason    finasteride (PROSCAR) 5 MG tablet DUPLICATE    loratadine (CLARITIN) 10 MG tablet Therapy completed    SITagliptin (JANUVIA) 50 MG tablet DISCONTINUED BY ANOTHER CLINICIAN     Return in about 3 months (around 9/27/2019).     Xiomy Moreno PA-C

## 2019-06-27 NOTE — PATIENT INSTRUCTIONS
Increase novolog to 20 unit with breakfast and dinner  And lantus 40 unit at bedtime  Check bs prior to each dose

## 2019-08-06 DIAGNOSIS — I10 ESSENTIAL HYPERTENSION: ICD-10-CM

## 2019-08-06 DIAGNOSIS — E78.5 HYPERLIPIDEMIA, UNSPECIFIED HYPERLIPIDEMIA TYPE: ICD-10-CM

## 2019-08-06 LAB
ALBUMIN SERPL-MCNC: 3.7 G/DL (ref 3.5–4.6)
ALP BLD-CCNC: 79 U/L (ref 35–104)
ALT SERPL-CCNC: 15 U/L (ref 0–41)
ANION GAP SERPL CALCULATED.3IONS-SCNC: 15 MEQ/L (ref 9–15)
AST SERPL-CCNC: 17 U/L (ref 0–40)
BASOPHILS ABSOLUTE: 0 K/UL (ref 0–0.2)
BASOPHILS RELATIVE PERCENT: 0.5 %
BILIRUB SERPL-MCNC: 0.5 MG/DL (ref 0.2–0.7)
BUN BLDV-MCNC: 49 MG/DL (ref 8–23)
CALCIUM SERPL-MCNC: 9 MG/DL (ref 8.5–9.9)
CHLORIDE BLD-SCNC: 101 MEQ/L (ref 95–107)
CHOLESTEROL, TOTAL: 107 MG/DL (ref 0–199)
CO2: 23 MEQ/L (ref 20–31)
CREAT SERPL-MCNC: 1.94 MG/DL (ref 0.7–1.2)
EOSINOPHILS ABSOLUTE: 0.2 K/UL (ref 0–0.7)
EOSINOPHILS RELATIVE PERCENT: 2.4 %
GFR AFRICAN AMERICAN: 39.7
GFR NON-AFRICAN AMERICAN: 32.8
GLOBULIN: 2.9 G/DL (ref 2.3–3.5)
GLUCOSE BLD-MCNC: 300 MG/DL (ref 70–99)
HCT VFR BLD CALC: 32.6 % (ref 42–52)
HDLC SERPL-MCNC: 32 MG/DL (ref 40–59)
HEMOGLOBIN: 10.4 G/DL (ref 14–18)
LDL CHOLESTEROL CALCULATED: 50 MG/DL (ref 0–129)
LYMPHOCYTES ABSOLUTE: 1.7 K/UL (ref 1–4.8)
LYMPHOCYTES RELATIVE PERCENT: 25 %
MCH RBC QN AUTO: 24.9 PG (ref 27–31.3)
MCHC RBC AUTO-ENTMCNC: 32.1 % (ref 33–37)
MCV RBC AUTO: 77.7 FL (ref 80–100)
MONOCYTES ABSOLUTE: 0.6 K/UL (ref 0.2–0.8)
MONOCYTES RELATIVE PERCENT: 9.4 %
NEUTROPHILS ABSOLUTE: 4.2 K/UL (ref 1.4–6.5)
NEUTROPHILS RELATIVE PERCENT: 62.7 %
PDW BLD-RTO: 18.2 % (ref 11.5–14.5)
PLATELET # BLD: 153 K/UL (ref 130–400)
POTASSIUM SERPL-SCNC: 5.3 MEQ/L (ref 3.4–4.9)
RBC # BLD: 4.19 M/UL (ref 4.7–6.1)
SODIUM BLD-SCNC: 139 MEQ/L (ref 135–144)
TOTAL PROTEIN: 6.6 G/DL (ref 6.3–8)
TRIGL SERPL-MCNC: 123 MG/DL (ref 0–150)
WBC # BLD: 6.7 K/UL (ref 4.8–10.8)

## 2019-08-26 RX ORDER — FINASTERIDE 5 MG/1
TABLET, FILM COATED ORAL
Qty: 90 TABLET | Refills: 3 | Status: SHIPPED | OUTPATIENT
Start: 2019-08-26 | End: 2020-06-01

## 2019-09-26 ENCOUNTER — OFFICE VISIT (OUTPATIENT)
Dept: FAMILY MEDICINE CLINIC | Age: 84
End: 2019-09-26
Payer: MEDICARE

## 2019-09-26 ENCOUNTER — TELEPHONE (OUTPATIENT)
Dept: FAMILY MEDICINE CLINIC | Age: 84
End: 2019-09-26

## 2019-09-26 VITALS
HEIGHT: 69 IN | WEIGHT: 205.6 LBS | SYSTOLIC BLOOD PRESSURE: 124 MMHG | OXYGEN SATURATION: 98 % | TEMPERATURE: 97.7 F | BODY MASS INDEX: 30.45 KG/M2 | DIASTOLIC BLOOD PRESSURE: 60 MMHG | RESPIRATION RATE: 16 BRPM | HEART RATE: 66 BPM

## 2019-09-26 DIAGNOSIS — H65.93 FLUID LEVEL BEHIND TYMPANIC MEMBRANE OF BOTH EARS: ICD-10-CM

## 2019-09-26 DIAGNOSIS — I65.23 BILATERAL CAROTID ARTERY STENOSIS: ICD-10-CM

## 2019-09-26 DIAGNOSIS — E11.8 TYPE 2 DIABETES MELLITUS WITH COMPLICATION, UNSPECIFIED WHETHER LONG TERM INSULIN USE: Primary | ICD-10-CM

## 2019-09-26 DIAGNOSIS — D64.9 ANEMIA, UNSPECIFIED TYPE: ICD-10-CM

## 2019-09-26 LAB — HBA1C MFR BLD: 9.1 %

## 2019-09-26 PROCEDURE — 99214 OFFICE O/P EST MOD 30 MIN: CPT | Performed by: PHYSICIAN ASSISTANT

## 2019-09-26 PROCEDURE — G8598 ASA/ANTIPLAT THER USED: HCPCS | Performed by: PHYSICIAN ASSISTANT

## 2019-09-26 PROCEDURE — G8417 CALC BMI ABV UP PARAM F/U: HCPCS | Performed by: PHYSICIAN ASSISTANT

## 2019-09-26 PROCEDURE — 1123F ACP DISCUSS/DSCN MKR DOCD: CPT | Performed by: PHYSICIAN ASSISTANT

## 2019-09-26 PROCEDURE — G8427 DOCREV CUR MEDS BY ELIG CLIN: HCPCS | Performed by: PHYSICIAN ASSISTANT

## 2019-09-26 PROCEDURE — 1036F TOBACCO NON-USER: CPT | Performed by: PHYSICIAN ASSISTANT

## 2019-09-26 PROCEDURE — 4040F PNEUMOC VAC/ADMIN/RCVD: CPT | Performed by: PHYSICIAN ASSISTANT

## 2019-09-26 PROCEDURE — 83036 HEMOGLOBIN GLYCOSYLATED A1C: CPT | Performed by: PHYSICIAN ASSISTANT

## 2019-09-26 RX ORDER — LORATADINE 10 MG/1
10 TABLET ORAL DAILY
Qty: 30 TABLET | Refills: 1 | Status: SHIPPED | OUTPATIENT
Start: 2019-09-26 | End: 2020-04-27

## 2019-09-26 RX ORDER — LORATADINE 10 MG/1
10 TABLET ORAL DAILY
Qty: 30 TABLET | Refills: 1 | Status: SHIPPED | OUTPATIENT
Start: 2019-09-26 | End: 2019-09-26 | Stop reason: SDUPTHER

## 2019-09-26 ASSESSMENT — ENCOUNTER SYMPTOMS
NAUSEA: 0
DIARRHEA: 0
VOMITING: 0
BLOOD IN STOOL: 0
SORE THROAT: 0
BACK PAIN: 0
WHEEZING: 0
SHORTNESS OF BREATH: 1
CONSTIPATION: 0
COUGH: 0

## 2019-09-26 NOTE — PROGRESS NOTES
Right Upper Arm   Position: Sitting   Cuff Size: Medium Adult   Pulse: 66   Resp: 16   Temp: 97.7 °F (36.5 °C)   TempSrc: Oral   SpO2: 98%   Weight: 205 lb 9.6 oz (93.3 kg)   Height: 5' 9\" (1.753 m)     Physical Exam   Constitutional: He is oriented to person, place, and time. He appears well-developed and well-nourished. HENT:   Head: Normocephalic and atraumatic. Right Ear: A middle ear effusion is present. Left Ear: A middle ear effusion is present. Nose: Nose normal.   Mouth/Throat: Oropharynx is clear and moist.   Wears left hearing aid   Eyes: Pupils are equal, round, and reactive to light. Conjunctivae and EOM are normal.   Neck: Normal range of motion. Neck supple. Carotid bruit is present. Right   Cardiovascular: Normal rate, regular rhythm and normal heart sounds. No murmur heard. Pulmonary/Chest: Effort normal and breath sounds normal. No stridor. No respiratory distress. He has no wheezes. Abdominal: Soft. Bowel sounds are normal. He exhibits no mass. There is no tenderness. Musculoskeletal: He exhibits no edema. Neurological: He is alert and oriented to person, place, and time. Skin: Skin is warm and dry. Port wine stain left cheek   Psychiatric: He has a normal mood and affect. Assessment & Plan    Diagnosis Orders   1. Type 2 diabetes mellitus with complication, unspecified whether long term insulin use (HCC)  POCT glycosylated hemoglobin (Hb A1C)   2. Fluid level behind tympanic membrane of both ears  loratadine (CLARITIN) 10 MG tablet    DISCONTINUED: loratadine (CLARITIN) 10 MG tablet   3. Bilateral carotid artery stenosis     4.  Anemia, unspecified type           Orders Placed This Encounter   Procedures    POCT glycosylated hemoglobin (Hb A1C)     Orders Placed This Encounter   Medications    DISCONTD: loratadine (CLARITIN) 10 MG tablet     Sig: Take 1 tablet by mouth daily     Dispense:  30 tablet     Refill:  1    loratadine (CLARITIN) 10 MG tablet     Sig:

## 2019-09-26 NOTE — TELEPHONE ENCOUNTER
Patient to return here - fasting  - or to the [facility] with the order for additional labs to classify his anemia

## 2019-10-22 ENCOUNTER — HOSPITAL ENCOUNTER (EMERGENCY)
Age: 84
Discharge: HOME | End: 2019-10-22
Payer: MEDICARE

## 2019-10-22 ENCOUNTER — APPOINTMENT (OUTPATIENT)
Dept: GENERAL RADIOLOGY | Age: 84
End: 2019-10-22
Payer: MEDICARE

## 2019-10-22 VITALS
RESPIRATION RATE: 18 BRPM | HEIGHT: 70 IN | SYSTOLIC BLOOD PRESSURE: 170 MMHG | WEIGHT: 207 LBS | TEMPERATURE: 97.6 F | BODY MASS INDEX: 29.63 KG/M2 | DIASTOLIC BLOOD PRESSURE: 54 MMHG | OXYGEN SATURATION: 100 % | HEART RATE: 68 BPM

## 2019-10-22 DIAGNOSIS — D50.8 OTHER IRON DEFICIENCY ANEMIA: Primary | ICD-10-CM

## 2019-10-22 LAB
ABO/RH: NORMAL
ALBUMIN SERPL-MCNC: 4 G/DL (ref 3.5–4.6)
ALP BLD-CCNC: 80 U/L (ref 35–104)
ALT SERPL-CCNC: 24 U/L (ref 0–41)
ANION GAP SERPL CALCULATED.3IONS-SCNC: 11 MEQ/L (ref 9–15)
ANISOCYTOSIS: ABNORMAL
ANTIBODY SCREEN: NORMAL
AST SERPL-CCNC: 20 U/L (ref 0–40)
BASOPHILS ABSOLUTE: 0.1 K/UL (ref 0–0.2)
BASOPHILS RELATIVE PERCENT: 1 %
BILIRUB SERPL-MCNC: 0.6 MG/DL (ref 0.2–0.7)
BUN BLDV-MCNC: 40 MG/DL (ref 8–23)
CALCIUM SERPL-MCNC: 9.1 MG/DL (ref 8.5–9.9)
CHLORIDE BLD-SCNC: 104 MEQ/L (ref 95–107)
CO2: 26 MEQ/L (ref 20–31)
CREAT SERPL-MCNC: 1.68 MG/DL (ref 0.7–1.2)
EKG ATRIAL RATE: 60 BPM
EKG P AXIS: 92 DEGREES
EKG P-R INTERVAL: 146 MS
EKG Q-T INTERVAL: 468 MS
EKG QRS DURATION: 92 MS
EKG QTC CALCULATION (BAZETT): 468 MS
EKG R AXIS: 52 DEGREES
EKG T AXIS: 139 DEGREES
EKG VENTRICULAR RATE: 60 BPM
EOSINOPHILS ABSOLUTE: 0.1 K/UL (ref 0–0.7)
EOSINOPHILS RELATIVE PERCENT: 1.3 %
GFR AFRICAN AMERICAN: 46.9
GFR NON-AFRICAN AMERICAN: 38.8
GLOBULIN: 2.9 G/DL (ref 2.3–3.5)
GLUCOSE BLD-MCNC: 74 MG/DL (ref 70–99)
HCT VFR BLD CALC: 25 % (ref 42–52)
HEMOGLOBIN: 7.6 G/DL (ref 14–18)
HYPOCHROMIA: ABNORMAL
LYMPHOCYTES ABSOLUTE: 1.3 K/UL (ref 1–4.8)
LYMPHOCYTES RELATIVE PERCENT: 16.2 %
MCH RBC QN AUTO: 22.5 PG (ref 27–31.3)
MCHC RBC AUTO-ENTMCNC: 30.4 % (ref 33–37)
MCV RBC AUTO: 74.1 FL (ref 80–100)
MICROCYTES: ABNORMAL
MONOCYTES ABSOLUTE: 0.7 K/UL (ref 0.2–0.8)
MONOCYTES RELATIVE PERCENT: 8.2 %
NEUTROPHILS ABSOLUTE: 5.8 K/UL (ref 1.4–6.5)
NEUTROPHILS RELATIVE PERCENT: 73.3 %
OVALOCYTES: ABNORMAL
PDW BLD-RTO: 18.7 % (ref 11.5–14.5)
PLATELET # BLD: 153 K/UL (ref 130–400)
PLATELET SLIDE REVIEW: NORMAL
POIKILOCYTES: ABNORMAL
POLYCHROMASIA: ABNORMAL
POTASSIUM SERPL-SCNC: 4.9 MEQ/L (ref 3.4–4.9)
RBC # BLD: 3.38 M/UL (ref 4.7–6.1)
SLIDE REVIEW: ABNORMAL
SODIUM BLD-SCNC: 141 MEQ/L (ref 135–144)
TARGET CELLS: ABNORMAL
TOTAL CK: 139 U/L (ref 0–190)
TOTAL PROTEIN: 6.9 G/DL (ref 6.3–8)
TROPONIN: 0.02 NG/ML (ref 0–0.01)
WBC # BLD: 8 K/UL (ref 4.8–10.8)

## 2019-10-22 PROCEDURE — 36415 COLL VENOUS BLD VENIPUNCTURE: CPT

## 2019-10-22 PROCEDURE — 86900 BLOOD TYPING SEROLOGIC ABO: CPT

## 2019-10-22 PROCEDURE — 71045 X-RAY EXAM CHEST 1 VIEW: CPT

## 2019-10-22 PROCEDURE — 80053 COMPREHEN METABOLIC PANEL: CPT

## 2019-10-22 PROCEDURE — 93005 ELECTROCARDIOGRAM TRACING: CPT | Performed by: STUDENT IN AN ORGANIZED HEALTH CARE EDUCATION/TRAINING PROGRAM

## 2019-10-22 PROCEDURE — 82550 ASSAY OF CK (CPK): CPT

## 2019-10-22 PROCEDURE — 85025 COMPLETE CBC W/AUTO DIFF WBC: CPT

## 2019-10-22 PROCEDURE — 86850 RBC ANTIBODY SCREEN: CPT

## 2019-10-22 PROCEDURE — 84484 ASSAY OF TROPONIN QUANT: CPT

## 2019-10-22 PROCEDURE — 99285 EMERGENCY DEPT VISIT HI MDM: CPT

## 2019-10-22 PROCEDURE — 86901 BLOOD TYPING SEROLOGIC RH(D): CPT

## 2019-10-22 RX ORDER — FERROUS SULFATE 325(65) MG
325 TABLET ORAL 2 TIMES DAILY
Qty: 60 TABLET | Refills: 0 | Status: SHIPPED | OUTPATIENT
Start: 2019-10-22 | End: 2020-02-26 | Stop reason: SDUPTHER

## 2019-10-22 RX ORDER — POLYETHYLENE GLYCOL 3350 17 G/17G
17 POWDER, FOR SOLUTION ORAL DAILY
Qty: 1 BOTTLE | Refills: 0 | Status: SHIPPED | OUTPATIENT
Start: 2019-10-22 | End: 2019-10-27

## 2019-10-22 ASSESSMENT — ENCOUNTER SYMPTOMS
SHORTNESS OF BREATH: 1
EYE DISCHARGE: 0
SORE THROAT: 0
COLOR CHANGE: 0
ABDOMINAL DISTENTION: 0
ABDOMINAL PAIN: 0
CONSTIPATION: 0
RHINORRHEA: 0

## 2019-10-23 PROCEDURE — 93010 ELECTROCARDIOGRAM REPORT: CPT | Performed by: INTERNAL MEDICINE

## 2019-10-29 ENCOUNTER — OFFICE VISIT (OUTPATIENT)
Dept: FAMILY MEDICINE CLINIC | Age: 84
End: 2019-10-29
Payer: MEDICARE

## 2019-10-29 VITALS
RESPIRATION RATE: 16 BRPM | OXYGEN SATURATION: 98 % | DIASTOLIC BLOOD PRESSURE: 66 MMHG | SYSTOLIC BLOOD PRESSURE: 106 MMHG | TEMPERATURE: 97.8 F | HEART RATE: 56 BPM

## 2019-10-29 DIAGNOSIS — I25.83 CORONARY ARTERY DISEASE DUE TO LIPID RICH PLAQUE: ICD-10-CM

## 2019-10-29 DIAGNOSIS — I25.10 CORONARY ARTERY DISEASE DUE TO LIPID RICH PLAQUE: ICD-10-CM

## 2019-10-29 DIAGNOSIS — E11.69 TYPE 2 DIABETES MELLITUS WITH OTHER SPECIFIED COMPLICATION, UNSPECIFIED WHETHER LONG TERM INSULIN USE (HCC): ICD-10-CM

## 2019-10-29 DIAGNOSIS — Z23 NEED FOR INFLUENZA VACCINATION: ICD-10-CM

## 2019-10-29 DIAGNOSIS — D50.9 IRON DEFICIENCY ANEMIA, UNSPECIFIED IRON DEFICIENCY ANEMIA TYPE: ICD-10-CM

## 2019-10-29 DIAGNOSIS — R06.02 SOB (SHORTNESS OF BREATH): ICD-10-CM

## 2019-10-29 DIAGNOSIS — D50.9 IRON DEFICIENCY ANEMIA, UNSPECIFIED IRON DEFICIENCY ANEMIA TYPE: Primary | ICD-10-CM

## 2019-10-29 LAB
ANISOCYTOSIS: ABNORMAL
BASOPHILS ABSOLUTE: 0.1 K/UL (ref 0–0.2)
BASOPHILS RELATIVE PERCENT: 0.8 %
BURR CELLS: ABNORMAL
EOSINOPHILS ABSOLUTE: 0.2 K/UL (ref 0–0.7)
EOSINOPHILS RELATIVE PERCENT: 1.9 %
FOLATE: 17.8 NG/ML (ref 7.3–26.1)
HBA1C MFR BLD: 8.1 %
HCT VFR BLD CALC: 28.2 % (ref 42–52)
HEMOGLOBIN: 7.6 G/DL (ref 14–18)
IRON SATURATION: 31 % (ref 11–46)
IRON: 111 UG/DL (ref 59–158)
LYMPHOCYTES ABSOLUTE: 1.1 K/UL (ref 1–4.8)
LYMPHOCYTES RELATIVE PERCENT: 13.5 %
MCH RBC QN AUTO: 23.6 PG (ref 27–31.3)
MCHC RBC AUTO-ENTMCNC: 27.1 % (ref 33–37)
MCV RBC AUTO: 87 FL (ref 80–100)
MICROCYTES: ABNORMAL
MONOCYTES ABSOLUTE: 0.8 K/UL (ref 0.2–0.8)
MONOCYTES RELATIVE PERCENT: 9.7 %
NEUTROPHILS ABSOLUTE: 6.1 K/UL (ref 1.4–6.5)
NEUTROPHILS RELATIVE PERCENT: 74.1 %
OVALOCYTES: ABNORMAL
PDW BLD-RTO: 20.9 % (ref 11.5–14.5)
PLATELET # BLD: 123 K/UL (ref 130–400)
PLATELET SLIDE REVIEW: ABNORMAL
POIKILOCYTES: ABNORMAL
POLYCHROMASIA: ABNORMAL
RBC # BLD: 3.25 M/UL (ref 4.7–6.1)
SLIDE REVIEW: ABNORMAL
TEAR DROP CELLS: ABNORMAL
TOTAL IRON BINDING CAPACITY: 363 UG/DL (ref 178–450)
VITAMIN B-12: 1683 PG/ML (ref 232–1245)
WBC # BLD: 8.2 K/UL (ref 4.8–10.8)

## 2019-10-29 PROCEDURE — 90653 IIV ADJUVANT VACCINE IM: CPT | Performed by: PHYSICIAN ASSISTANT

## 2019-10-29 PROCEDURE — G8598 ASA/ANTIPLAT THER USED: HCPCS | Performed by: PHYSICIAN ASSISTANT

## 2019-10-29 PROCEDURE — G8482 FLU IMMUNIZE ORDER/ADMIN: HCPCS | Performed by: PHYSICIAN ASSISTANT

## 2019-10-29 PROCEDURE — 4040F PNEUMOC VAC/ADMIN/RCVD: CPT | Performed by: PHYSICIAN ASSISTANT

## 2019-10-29 PROCEDURE — 99213 OFFICE O/P EST LOW 20 MIN: CPT | Performed by: PHYSICIAN ASSISTANT

## 2019-10-29 PROCEDURE — 1036F TOBACCO NON-USER: CPT | Performed by: PHYSICIAN ASSISTANT

## 2019-10-29 PROCEDURE — G8417 CALC BMI ABV UP PARAM F/U: HCPCS | Performed by: PHYSICIAN ASSISTANT

## 2019-10-29 PROCEDURE — 83036 HEMOGLOBIN GLYCOSYLATED A1C: CPT | Performed by: PHYSICIAN ASSISTANT

## 2019-10-29 PROCEDURE — G8427 DOCREV CUR MEDS BY ELIG CLIN: HCPCS | Performed by: PHYSICIAN ASSISTANT

## 2019-10-29 PROCEDURE — 1123F ACP DISCUSS/DSCN MKR DOCD: CPT | Performed by: PHYSICIAN ASSISTANT

## 2019-10-29 PROCEDURE — G0008 ADMIN INFLUENZA VIRUS VAC: HCPCS | Performed by: PHYSICIAN ASSISTANT

## 2019-10-29 ASSESSMENT — ENCOUNTER SYMPTOMS
NAUSEA: 0
WHEEZING: 0
ABDOMINAL PAIN: 0
BACK PAIN: 0
COUGH: 0
CONSTIPATION: 0
EYE PAIN: 0
DIARRHEA: 0
VOMITING: 0
SHORTNESS OF BREATH: 1
SORE THROAT: 0
ANAL BLEEDING: 0
BLOOD IN STOOL: 0

## 2019-10-30 DIAGNOSIS — D69.6 THROMBOCYTOPENIA (HCC): Primary | ICD-10-CM

## 2019-10-30 DIAGNOSIS — D64.9 ANEMIA, UNSPECIFIED TYPE: ICD-10-CM

## 2019-10-30 DIAGNOSIS — D50.9 IRON DEFICIENCY ANEMIA, UNSPECIFIED IRON DEFICIENCY ANEMIA TYPE: ICD-10-CM

## 2019-11-09 ENCOUNTER — OFFICE VISIT (OUTPATIENT)
Dept: FAMILY MEDICINE CLINIC | Age: 84
End: 2019-11-09
Payer: MEDICARE

## 2019-11-09 VITALS
WEIGHT: 219 LBS | SYSTOLIC BLOOD PRESSURE: 110 MMHG | RESPIRATION RATE: 18 BRPM | DIASTOLIC BLOOD PRESSURE: 60 MMHG | HEIGHT: 70 IN | TEMPERATURE: 98.5 F | BODY MASS INDEX: 31.35 KG/M2

## 2019-11-09 DIAGNOSIS — S81.802A WOUND OF LEFT LOWER EXTREMITY, INITIAL ENCOUNTER: Primary | ICD-10-CM

## 2019-11-09 DIAGNOSIS — S81.802A WOUND OF LEFT LOWER EXTREMITY, INITIAL ENCOUNTER: ICD-10-CM

## 2019-11-09 DIAGNOSIS — L03.115 CELLULITIS OF RIGHT LEG: ICD-10-CM

## 2019-11-09 DIAGNOSIS — D64.9 ANEMIA, UNSPECIFIED TYPE: ICD-10-CM

## 2019-11-09 LAB
ACANTHOCYTES: ABNORMAL
ALBUMIN SERPL-MCNC: 3.5 G/DL (ref 3.5–4.6)
ALP BLD-CCNC: 88 U/L (ref 35–104)
ALT SERPL-CCNC: 18 U/L (ref 0–41)
ANION GAP SERPL CALCULATED.3IONS-SCNC: 11 MEQ/L (ref 9–15)
ANISOCYTOSIS: ABNORMAL
AST SERPL-CCNC: 18 U/L (ref 0–40)
BASOPHILS ABSOLUTE: 0 K/UL (ref 0–0.2)
BASOPHILS RELATIVE PERCENT: 0.8 %
BILIRUB SERPL-MCNC: 0.7 MG/DL (ref 0.2–0.7)
BUN BLDV-MCNC: 32 MG/DL (ref 8–23)
CALCIUM SERPL-MCNC: 8.9 MG/DL (ref 8.5–9.9)
CHLORIDE BLD-SCNC: 105 MEQ/L (ref 95–107)
CO2: 26 MEQ/L (ref 20–31)
CREAT SERPL-MCNC: 1.44 MG/DL (ref 0.7–1.2)
EOSINOPHILS ABSOLUTE: 0.1 K/UL (ref 0–0.7)
EOSINOPHILS RELATIVE PERCENT: 1.5 %
FERRITIN: 22.3 NG/ML (ref 30–400)
GFR AFRICAN AMERICAN: 56
GFR NON-AFRICAN AMERICAN: 46.3
GLOBULIN: 2.5 G/DL (ref 2.3–3.5)
GLUCOSE BLD-MCNC: 307 MG/DL (ref 70–99)
HCT VFR BLD CALC: 26.8 % (ref 42–52)
HEMOGLOBIN: 7.9 G/DL (ref 14–18)
HYPOCHROMIA: ABNORMAL
IRON SATURATION: 9 % (ref 11–46)
IRON: 30 UG/DL (ref 59–158)
LYMPHOCYTES ABSOLUTE: 0.8 K/UL (ref 1–4.8)
LYMPHOCYTES RELATIVE PERCENT: 12.1 %
MACROCYTES: ABNORMAL
MCH RBC QN AUTO: 26.5 PG (ref 27–31.3)
MCHC RBC AUTO-ENTMCNC: 29.3 % (ref 33–37)
MCV RBC AUTO: 90.4 FL (ref 80–100)
MICROCYTES: ABNORMAL
MONOCYTES ABSOLUTE: 0.7 K/UL (ref 0.2–0.8)
MONOCYTES RELATIVE PERCENT: 10.6 %
NEUTROPHILS ABSOLUTE: 4.7 K/UL (ref 1.4–6.5)
NEUTROPHILS RELATIVE PERCENT: 75 %
PDW BLD-RTO: 33.1 % (ref 11.5–14.5)
PLATELET # BLD: 151 K/UL (ref 130–400)
POIKILOCYTES: ABNORMAL
POLYCHROMASIA: ABNORMAL
POTASSIUM SERPL-SCNC: 5.7 MEQ/L (ref 3.4–4.9)
RBC # BLD: 2.97 M/UL (ref 4.7–6.1)
SODIUM BLD-SCNC: 142 MEQ/L (ref 135–144)
TEAR DROP CELLS: ABNORMAL
TOTAL IRON BINDING CAPACITY: 339 UG/DL (ref 178–450)
TOTAL PROTEIN: 6 G/DL (ref 6.3–8)
TOXIC GRANULATION: ABNORMAL
VACUOLATED NEUTROPHILS: PRESENT
WBC # BLD: 6.3 K/UL (ref 4.8–10.8)

## 2019-11-09 PROCEDURE — 4040F PNEUMOC VAC/ADMIN/RCVD: CPT | Performed by: INTERNAL MEDICINE

## 2019-11-09 PROCEDURE — G8428 CUR MEDS NOT DOCUMENT: HCPCS | Performed by: INTERNAL MEDICINE

## 2019-11-09 PROCEDURE — G8598 ASA/ANTIPLAT THER USED: HCPCS | Performed by: INTERNAL MEDICINE

## 2019-11-09 PROCEDURE — 29580 STRAPPING UNNA BOOT: CPT | Performed by: INTERNAL MEDICINE

## 2019-11-09 PROCEDURE — 99214 OFFICE O/P EST MOD 30 MIN: CPT | Performed by: INTERNAL MEDICINE

## 2019-11-09 PROCEDURE — G8482 FLU IMMUNIZE ORDER/ADMIN: HCPCS | Performed by: INTERNAL MEDICINE

## 2019-11-09 PROCEDURE — G8417 CALC BMI ABV UP PARAM F/U: HCPCS | Performed by: INTERNAL MEDICINE

## 2019-11-09 PROCEDURE — 1036F TOBACCO NON-USER: CPT | Performed by: INTERNAL MEDICINE

## 2019-11-09 PROCEDURE — 1123F ACP DISCUSS/DSCN MKR DOCD: CPT | Performed by: INTERNAL MEDICINE

## 2019-11-09 RX ORDER — CEPHALEXIN 500 MG/1
500 CAPSULE ORAL 2 TIMES DAILY
Qty: 14 CAPSULE | Refills: 0 | Status: SHIPPED | OUTPATIENT
Start: 2019-11-09 | End: 2019-11-09

## 2019-11-09 RX ORDER — CEPHALEXIN 500 MG/1
500 CAPSULE ORAL 2 TIMES DAILY
Qty: 14 CAPSULE | Refills: 0 | Status: SHIPPED | OUTPATIENT
Start: 2019-11-09 | End: 2019-11-16

## 2019-11-09 ASSESSMENT — ENCOUNTER SYMPTOMS
EYE REDNESS: 0
CONSTIPATION: 0
CHEST TIGHTNESS: 0
NAUSEA: 0
BACK PAIN: 0
RECTAL PAIN: 0
BLOOD IN STOOL: 0
SINUS PAIN: 0
EYE DISCHARGE: 0
WHEEZING: 0
SINUS PRESSURE: 0
DIARRHEA: 0
EYE ITCHING: 0
RHINORRHEA: 0
TROUBLE SWALLOWING: 0
ABDOMINAL PAIN: 0
SORE THROAT: 0
FACIAL SWELLING: 0
COUGH: 0
SHORTNESS OF BREATH: 0
APNEA: 0
EYE PAIN: 0
VOMITING: 0
ABDOMINAL DISTENTION: 0
COLOR CHANGE: 0
PHOTOPHOBIA: 0
VOICE CHANGE: 0

## 2019-11-10 DIAGNOSIS — E87.5 HYPERKALEMIA: Primary | ICD-10-CM

## 2019-11-11 ENCOUNTER — OFFICE VISIT (OUTPATIENT)
Dept: FAMILY MEDICINE CLINIC | Age: 84
End: 2019-11-11
Payer: MEDICARE

## 2019-11-11 VITALS
SYSTOLIC BLOOD PRESSURE: 108 MMHG | TEMPERATURE: 98 F | BODY MASS INDEX: 31.42 KG/M2 | RESPIRATION RATE: 16 BRPM | HEIGHT: 70 IN | OXYGEN SATURATION: 95 % | HEART RATE: 67 BPM | DIASTOLIC BLOOD PRESSURE: 64 MMHG

## 2019-11-11 DIAGNOSIS — B96.89 BACTERIAL SKIN INFECTION: ICD-10-CM

## 2019-11-11 DIAGNOSIS — D64.9 ANEMIA, UNSPECIFIED TYPE: ICD-10-CM

## 2019-11-11 DIAGNOSIS — E87.5 HYPERKALEMIA: ICD-10-CM

## 2019-11-11 DIAGNOSIS — L08.9 BACTERIAL SKIN INFECTION: Primary | ICD-10-CM

## 2019-11-11 DIAGNOSIS — R06.02 SOB (SHORTNESS OF BREATH) ON EXERTION: ICD-10-CM

## 2019-11-11 DIAGNOSIS — B96.89 BACTERIAL SKIN INFECTION: Primary | ICD-10-CM

## 2019-11-11 DIAGNOSIS — R60.0 LOCALIZED EDEMA: ICD-10-CM

## 2019-11-11 DIAGNOSIS — L08.9 BACTERIAL SKIN INFECTION: ICD-10-CM

## 2019-11-11 PROCEDURE — 1123F ACP DISCUSS/DSCN MKR DOCD: CPT | Performed by: PHYSICIAN ASSISTANT

## 2019-11-11 PROCEDURE — G8427 DOCREV CUR MEDS BY ELIG CLIN: HCPCS | Performed by: PHYSICIAN ASSISTANT

## 2019-11-11 PROCEDURE — 99214 OFFICE O/P EST MOD 30 MIN: CPT | Performed by: PHYSICIAN ASSISTANT

## 2019-11-11 PROCEDURE — G8417 CALC BMI ABV UP PARAM F/U: HCPCS | Performed by: PHYSICIAN ASSISTANT

## 2019-11-11 PROCEDURE — G8598 ASA/ANTIPLAT THER USED: HCPCS | Performed by: PHYSICIAN ASSISTANT

## 2019-11-11 PROCEDURE — G8482 FLU IMMUNIZE ORDER/ADMIN: HCPCS | Performed by: PHYSICIAN ASSISTANT

## 2019-11-11 PROCEDURE — 4040F PNEUMOC VAC/ADMIN/RCVD: CPT | Performed by: PHYSICIAN ASSISTANT

## 2019-11-11 PROCEDURE — 1036F TOBACCO NON-USER: CPT | Performed by: PHYSICIAN ASSISTANT

## 2019-11-11 RX ORDER — LIDOCAINE 40 MG/G
CREAM TOPICAL
Qty: 45 G | Refills: 2 | Status: SHIPPED | OUTPATIENT
Start: 2019-11-11 | End: 2019-11-11 | Stop reason: CLARIF

## 2019-11-11 RX ORDER — LIDOCAINE 40 MG/G
CREAM TOPICAL
Qty: 45 G | Refills: 2 | Status: SHIPPED | OUTPATIENT
Start: 2019-11-11 | End: 2020-04-27 | Stop reason: ALTCHOICE

## 2019-11-11 ASSESSMENT — ENCOUNTER SYMPTOMS
CONSTIPATION: 0
SORE THROAT: 0
SHORTNESS OF BREATH: 0
DIARRHEA: 0
BACK PAIN: 1
NAUSEA: 0
EYE PAIN: 0
VOMITING: 0
COUGH: 0

## 2019-11-13 ENCOUNTER — TELEPHONE (OUTPATIENT)
Dept: FAMILY MEDICINE CLINIC | Age: 84
End: 2019-11-13

## 2019-11-15 ENCOUNTER — TELEPHONE (OUTPATIENT)
Dept: FAMILY MEDICINE CLINIC | Age: 84
End: 2019-11-15

## 2019-11-15 DIAGNOSIS — E87.5 HYPERKALEMIA: ICD-10-CM

## 2019-11-15 LAB
ANION GAP SERPL CALCULATED.3IONS-SCNC: 10 MEQ/L (ref 9–15)
BUN BLDV-MCNC: 36 MG/DL (ref 8–23)
CALCIUM SERPL-MCNC: 9.1 MG/DL (ref 8.5–9.9)
CHLORIDE BLD-SCNC: 105 MEQ/L (ref 95–107)
CO2: 29 MEQ/L (ref 20–31)
CREAT SERPL-MCNC: 1.43 MG/DL (ref 0.7–1.2)
GFR AFRICAN AMERICAN: 56.5
GFR NON-AFRICAN AMERICAN: 46.7
GLUCOSE BLD-MCNC: 150 MG/DL (ref 70–99)
POTASSIUM SERPL-SCNC: 5.2 MEQ/L (ref 3.4–4.9)
SODIUM BLD-SCNC: 144 MEQ/L (ref 135–144)

## 2019-11-15 RX ORDER — CIPROFLOXACIN 500 MG/1
500 TABLET, FILM COATED ORAL 2 TIMES DAILY
Qty: 20 TABLET | Refills: 0 | Status: SHIPPED | OUTPATIENT
Start: 2019-11-15 | End: 2019-11-18 | Stop reason: SDUPTHER

## 2019-11-15 RX ORDER — GREEN TEA/HOODIA GORDONII 315-12.5MG
1 CAPSULE ORAL 2 TIMES DAILY
Qty: 60 TABLET | Refills: 1 | Status: SHIPPED | OUTPATIENT
Start: 2019-11-15 | End: 2019-11-18 | Stop reason: SDUPTHER

## 2019-11-16 LAB
ANAEROBIC CULTURE: ABNORMAL
GRAM STAIN RESULT: ABNORMAL
ORGANISM: ABNORMAL
ORGANISM: ABNORMAL
WOUND/ABSCESS: ABNORMAL
WOUND/ABSCESS: ABNORMAL

## 2019-11-18 RX ORDER — CIPROFLOXACIN 500 MG/1
500 TABLET, FILM COATED ORAL 2 TIMES DAILY
Qty: 20 TABLET | Refills: 0 | Status: SHIPPED | OUTPATIENT
Start: 2019-11-18 | End: 2019-11-28

## 2019-11-18 RX ORDER — GREEN TEA/HOODIA GORDONII 315-12.5MG
1 CAPSULE ORAL 2 TIMES DAILY
Qty: 60 TABLET | Refills: 1 | Status: SHIPPED | OUTPATIENT
Start: 2019-11-18 | End: 2019-12-18

## 2019-11-19 ENCOUNTER — OFFICE VISIT (OUTPATIENT)
Dept: FAMILY MEDICINE CLINIC | Age: 84
End: 2019-11-19
Payer: MEDICARE

## 2019-11-19 VITALS
BODY MASS INDEX: 30.64 KG/M2 | WEIGHT: 214 LBS | HEART RATE: 75 BPM | OXYGEN SATURATION: 94 % | RESPIRATION RATE: 15 BRPM | HEIGHT: 70 IN | SYSTOLIC BLOOD PRESSURE: 128 MMHG | DIASTOLIC BLOOD PRESSURE: 70 MMHG | TEMPERATURE: 98 F

## 2019-11-19 DIAGNOSIS — D50.9 IRON DEFICIENCY ANEMIA, UNSPECIFIED IRON DEFICIENCY ANEMIA TYPE: ICD-10-CM

## 2019-11-19 DIAGNOSIS — L03.115 CELLULITIS OF RIGHT LOWER EXTREMITY WITHOUT FOOT: Primary | ICD-10-CM

## 2019-11-19 DIAGNOSIS — E87.5 HYPERKALEMIA: ICD-10-CM

## 2019-11-19 DIAGNOSIS — R06.09 DOE (DYSPNEA ON EXERTION): ICD-10-CM

## 2019-11-19 DIAGNOSIS — E87.5 HYPERKALEMIA: Primary | ICD-10-CM

## 2019-11-19 PROCEDURE — G8482 FLU IMMUNIZE ORDER/ADMIN: HCPCS | Performed by: PHYSICIAN ASSISTANT

## 2019-11-19 PROCEDURE — G8427 DOCREV CUR MEDS BY ELIG CLIN: HCPCS | Performed by: PHYSICIAN ASSISTANT

## 2019-11-19 PROCEDURE — 4040F PNEUMOC VAC/ADMIN/RCVD: CPT | Performed by: PHYSICIAN ASSISTANT

## 2019-11-19 PROCEDURE — G8598 ASA/ANTIPLAT THER USED: HCPCS | Performed by: PHYSICIAN ASSISTANT

## 2019-11-19 PROCEDURE — 99213 OFFICE O/P EST LOW 20 MIN: CPT | Performed by: PHYSICIAN ASSISTANT

## 2019-11-19 PROCEDURE — 1036F TOBACCO NON-USER: CPT | Performed by: PHYSICIAN ASSISTANT

## 2019-11-19 PROCEDURE — 1123F ACP DISCUSS/DSCN MKR DOCD: CPT | Performed by: PHYSICIAN ASSISTANT

## 2019-11-19 PROCEDURE — G8417 CALC BMI ABV UP PARAM F/U: HCPCS | Performed by: PHYSICIAN ASSISTANT

## 2019-11-19 ASSESSMENT — ENCOUNTER SYMPTOMS
BACK PAIN: 0
WHEEZING: 0
COUGH: 0
EYE PAIN: 0
SHORTNESS OF BREATH: 1
EYE DISCHARGE: 0
BLOOD IN STOOL: 0
NAUSEA: 0
ABDOMINAL PAIN: 0

## 2019-11-20 ENCOUNTER — TELEPHONE (OUTPATIENT)
Dept: FAMILY MEDICINE CLINIC | Age: 84
End: 2019-11-20

## 2019-11-26 ENCOUNTER — OFFICE VISIT (OUTPATIENT)
Dept: FAMILY MEDICINE CLINIC | Age: 84
End: 2019-11-26
Payer: MEDICARE

## 2019-11-26 VITALS
TEMPERATURE: 98.2 F | HEART RATE: 72 BPM | RESPIRATION RATE: 16 BRPM | DIASTOLIC BLOOD PRESSURE: 68 MMHG | SYSTOLIC BLOOD PRESSURE: 132 MMHG | OXYGEN SATURATION: 94 %

## 2019-11-26 DIAGNOSIS — D50.8 OTHER IRON DEFICIENCY ANEMIA: ICD-10-CM

## 2019-11-26 DIAGNOSIS — F51.01 PRIMARY INSOMNIA: ICD-10-CM

## 2019-11-26 DIAGNOSIS — L03.115 CELLULITIS OF RIGHT LOWER EXTREMITY: Primary | ICD-10-CM

## 2019-11-26 DIAGNOSIS — I25.83 CORONARY ARTERY DISEASE DUE TO LIPID RICH PLAQUE: ICD-10-CM

## 2019-11-26 DIAGNOSIS — I25.10 CORONARY ARTERY DISEASE DUE TO LIPID RICH PLAQUE: ICD-10-CM

## 2019-11-26 PROCEDURE — G8598 ASA/ANTIPLAT THER USED: HCPCS | Performed by: PHYSICIAN ASSISTANT

## 2019-11-26 PROCEDURE — G8417 CALC BMI ABV UP PARAM F/U: HCPCS | Performed by: PHYSICIAN ASSISTANT

## 2019-11-26 PROCEDURE — G8482 FLU IMMUNIZE ORDER/ADMIN: HCPCS | Performed by: PHYSICIAN ASSISTANT

## 2019-11-26 PROCEDURE — G8427 DOCREV CUR MEDS BY ELIG CLIN: HCPCS | Performed by: PHYSICIAN ASSISTANT

## 2019-11-26 PROCEDURE — 1123F ACP DISCUSS/DSCN MKR DOCD: CPT | Performed by: PHYSICIAN ASSISTANT

## 2019-11-26 PROCEDURE — 1036F TOBACCO NON-USER: CPT | Performed by: PHYSICIAN ASSISTANT

## 2019-11-26 PROCEDURE — 4040F PNEUMOC VAC/ADMIN/RCVD: CPT | Performed by: PHYSICIAN ASSISTANT

## 2019-11-26 PROCEDURE — 99214 OFFICE O/P EST MOD 30 MIN: CPT | Performed by: PHYSICIAN ASSISTANT

## 2019-11-26 RX ORDER — CIPROFLOXACIN 500 MG/1
500 TABLET, FILM COATED ORAL 2 TIMES DAILY
Qty: 20 TABLET | Refills: 0 | Status: SHIPPED | OUTPATIENT
Start: 2019-11-26 | End: 2019-12-06 | Stop reason: SDUPTHER

## 2019-11-26 RX ORDER — TRAZODONE HYDROCHLORIDE 50 MG/1
50 TABLET ORAL NIGHTLY PRN
Qty: 30 TABLET | Refills: 5 | Status: SHIPPED | OUTPATIENT
Start: 2019-11-26 | End: 2020-04-27 | Stop reason: ALTCHOICE

## 2019-11-26 ASSESSMENT — ENCOUNTER SYMPTOMS
DIARRHEA: 0
WHEEZING: 0
COUGH: 0
BACK PAIN: 0
CONSTIPATION: 0
SORE THROAT: 0
VOMITING: 0
SHORTNESS OF BREATH: 1
NAUSEA: 0

## 2019-11-27 ENCOUNTER — TELEPHONE (OUTPATIENT)
Dept: FAMILY MEDICINE CLINIC | Age: 84
End: 2019-11-27

## 2019-11-29 DIAGNOSIS — L08.9 BACTERIAL INFECTION OF SKIN: ICD-10-CM

## 2019-11-29 DIAGNOSIS — T14.8XXA NON-HEALING NON-SURGICAL WOUND: Primary | ICD-10-CM

## 2019-11-29 DIAGNOSIS — B96.89 BACTERIAL INFECTION OF SKIN: ICD-10-CM

## 2019-12-04 ENCOUNTER — HOSPITAL ENCOUNTER (OUTPATIENT)
Dept: WOUND CARE | Age: 84
Discharge: HOME | End: 2019-12-04
Payer: MEDICARE

## 2019-12-04 VITALS
TEMPERATURE: 98.4 F | DIASTOLIC BLOOD PRESSURE: 44 MMHG | HEART RATE: 73 BPM | RESPIRATION RATE: 18 BRPM | SYSTOLIC BLOOD PRESSURE: 85 MMHG

## 2019-12-04 DIAGNOSIS — S81.801A WOUND OF RIGHT LOWER EXTREMITY, INITIAL ENCOUNTER: ICD-10-CM

## 2019-12-04 PROCEDURE — 99213 OFFICE O/P EST LOW 20 MIN: CPT

## 2019-12-04 ASSESSMENT — PAIN DESCRIPTION - PAIN TYPE: TYPE: ACUTE PAIN

## 2019-12-04 ASSESSMENT — PAIN DESCRIPTION - LOCATION: LOCATION: LEG

## 2019-12-04 ASSESSMENT — PAIN SCALES - GENERAL: PAINLEVEL_OUTOF10: 3

## 2019-12-04 ASSESSMENT — PAIN DESCRIPTION - DESCRIPTORS: DESCRIPTORS: STABBING;SHOOTING;BURNING

## 2019-12-04 ASSESSMENT — PAIN DESCRIPTION - ORIENTATION: ORIENTATION: RIGHT

## 2019-12-06 DIAGNOSIS — L03.115 CELLULITIS OF RIGHT LOWER EXTREMITY: ICD-10-CM

## 2019-12-06 RX ORDER — CIPROFLOXACIN 500 MG/1
500 TABLET, FILM COATED ORAL 2 TIMES DAILY
Qty: 20 TABLET | Refills: 0 | Status: SHIPPED | OUTPATIENT
Start: 2019-12-06 | End: 2019-12-16

## 2019-12-10 ENCOUNTER — OFFICE VISIT (OUTPATIENT)
Dept: FAMILY MEDICINE CLINIC | Age: 84
End: 2019-12-10
Payer: MEDICARE

## 2019-12-10 VITALS
DIASTOLIC BLOOD PRESSURE: 64 MMHG | TEMPERATURE: 97.8 F | HEART RATE: 64 BPM | OXYGEN SATURATION: 96 % | RESPIRATION RATE: 16 BRPM | SYSTOLIC BLOOD PRESSURE: 116 MMHG

## 2019-12-10 DIAGNOSIS — L03.115 CELLULITIS OF RIGHT LOWER EXTREMITY: Primary | ICD-10-CM

## 2019-12-10 PROCEDURE — 4040F PNEUMOC VAC/ADMIN/RCVD: CPT | Performed by: PHYSICIAN ASSISTANT

## 2019-12-10 PROCEDURE — G8427 DOCREV CUR MEDS BY ELIG CLIN: HCPCS | Performed by: PHYSICIAN ASSISTANT

## 2019-12-10 PROCEDURE — G8417 CALC BMI ABV UP PARAM F/U: HCPCS | Performed by: PHYSICIAN ASSISTANT

## 2019-12-10 PROCEDURE — 1036F TOBACCO NON-USER: CPT | Performed by: PHYSICIAN ASSISTANT

## 2019-12-10 PROCEDURE — 99213 OFFICE O/P EST LOW 20 MIN: CPT | Performed by: PHYSICIAN ASSISTANT

## 2019-12-10 PROCEDURE — G8598 ASA/ANTIPLAT THER USED: HCPCS | Performed by: PHYSICIAN ASSISTANT

## 2019-12-10 PROCEDURE — 1123F ACP DISCUSS/DSCN MKR DOCD: CPT | Performed by: PHYSICIAN ASSISTANT

## 2019-12-10 PROCEDURE — G8482 FLU IMMUNIZE ORDER/ADMIN: HCPCS | Performed by: PHYSICIAN ASSISTANT

## 2019-12-10 ASSESSMENT — ENCOUNTER SYMPTOMS
WHEEZING: 0
SHORTNESS OF BREATH: 0
EYE PAIN: 0
NAUSEA: 0
VOMITING: 0
COUGH: 0
DIARRHEA: 0
CONSTIPATION: 0
SORE THROAT: 0
BACK PAIN: 0

## 2019-12-18 ENCOUNTER — HOSPITAL ENCOUNTER (OUTPATIENT)
Dept: WOUND CARE | Age: 84
Discharge: HOME | End: 2019-12-18
Payer: MEDICARE

## 2019-12-18 VITALS
DIASTOLIC BLOOD PRESSURE: 54 MMHG | TEMPERATURE: 98.1 F | HEART RATE: 62 BPM | RESPIRATION RATE: 16 BRPM | SYSTOLIC BLOOD PRESSURE: 124 MMHG

## 2019-12-18 LAB
ANION GAP SERPL CALCULATED.3IONS-SCNC: 12 MMOL/L (ref 10–20)
B-TYPE NATRIURETIC PEPTIDE: 417 PG/ML (ref 0–99)
BICARBONATE: 28 MMOL/L (ref 21–32)
CALCIUM SERPL-MCNC: 9.1 MG/DL (ref 8.6–10.3)
CHLORIDE BLD-SCNC: 106 MMOL/L (ref 98–107)
CREAT SERPL-MCNC: 1.58 MG/DL (ref 0.5–1.3)
GFR AFRICAN AMERICAN: 51 ML/MIN/1.73M2
GFR NON-AFRICAN AMERICAN: 42 ML/MIN/1.73M2
GLUCOSE: 55 MG/DL (ref 74–99)
POTASSIUM SERPL-SCNC: 4.2 MMOL/L (ref 3.5–5.3)
SODIUM BLD-SCNC: 142 MMOL/L (ref 136–145)
UREA NITROGEN: 24 MG/DL (ref 6–23)

## 2019-12-18 PROCEDURE — 99213 OFFICE O/P EST LOW 20 MIN: CPT

## 2019-12-26 ENCOUNTER — OFFICE VISIT (OUTPATIENT)
Dept: FAMILY MEDICINE CLINIC | Age: 84
End: 2019-12-26
Payer: MEDICARE

## 2019-12-26 VITALS
SYSTOLIC BLOOD PRESSURE: 112 MMHG | DIASTOLIC BLOOD PRESSURE: 50 MMHG | HEIGHT: 70 IN | OXYGEN SATURATION: 98 % | BODY MASS INDEX: 28.55 KG/M2 | WEIGHT: 199.4 LBS | TEMPERATURE: 98 F | HEART RATE: 66 BPM

## 2019-12-26 DIAGNOSIS — I10 ESSENTIAL HYPERTENSION: ICD-10-CM

## 2019-12-26 DIAGNOSIS — B96.89 BACTERIAL INFECTION OF SKIN: Primary | ICD-10-CM

## 2019-12-26 DIAGNOSIS — D50.9 IRON DEFICIENCY ANEMIA, UNSPECIFIED IRON DEFICIENCY ANEMIA TYPE: ICD-10-CM

## 2019-12-26 DIAGNOSIS — Z79.4 TYPE 2 DIABETES MELLITUS WITH OTHER SPECIFIED COMPLICATION, WITH LONG-TERM CURRENT USE OF INSULIN (HCC): ICD-10-CM

## 2019-12-26 DIAGNOSIS — E11.69 TYPE 2 DIABETES MELLITUS WITH OTHER SPECIFIED COMPLICATION, WITH LONG-TERM CURRENT USE OF INSULIN (HCC): ICD-10-CM

## 2019-12-26 DIAGNOSIS — L08.9 BACTERIAL INFECTION OF SKIN: Primary | ICD-10-CM

## 2019-12-26 PROCEDURE — G8482 FLU IMMUNIZE ORDER/ADMIN: HCPCS | Performed by: PHYSICIAN ASSISTANT

## 2019-12-26 PROCEDURE — 1036F TOBACCO NON-USER: CPT | Performed by: PHYSICIAN ASSISTANT

## 2019-12-26 PROCEDURE — G8417 CALC BMI ABV UP PARAM F/U: HCPCS | Performed by: PHYSICIAN ASSISTANT

## 2019-12-26 PROCEDURE — G8598 ASA/ANTIPLAT THER USED: HCPCS | Performed by: PHYSICIAN ASSISTANT

## 2019-12-26 PROCEDURE — 99214 OFFICE O/P EST MOD 30 MIN: CPT | Performed by: PHYSICIAN ASSISTANT

## 2019-12-26 PROCEDURE — 1123F ACP DISCUSS/DSCN MKR DOCD: CPT | Performed by: PHYSICIAN ASSISTANT

## 2019-12-26 PROCEDURE — 4040F PNEUMOC VAC/ADMIN/RCVD: CPT | Performed by: PHYSICIAN ASSISTANT

## 2019-12-26 PROCEDURE — G8427 DOCREV CUR MEDS BY ELIG CLIN: HCPCS | Performed by: PHYSICIAN ASSISTANT

## 2019-12-26 ASSESSMENT — ENCOUNTER SYMPTOMS
SHORTNESS OF BREATH: 0
EYE REDNESS: 0
COUGH: 0
COLOR CHANGE: 0
DIARRHEA: 0
ABDOMINAL PAIN: 0
SORE THROAT: 0
EYE DISCHARGE: 0

## 2020-01-01 LAB
ANION GAP SERPL CALCULATED.3IONS-SCNC: 10 MEQ/L (ref 9–15)
BUN BLDV-MCNC: 29 MG/DL (ref 8–23)
CHLORIDE BLD-SCNC: 103 MEQ/L (ref 95–107)
CO2: 27 MEQ/L (ref 20–31)
CREAT SERPL-MCNC: 1.78 MG/DL (ref 0.7–1.2)
GFR AFRICAN AMERICAN: 43.8
GFR NON-AFRICAN AMERICAN: 36.2
POTASSIUM SERPL-SCNC: 4.6 MEQ/L (ref 3.4–4.9)
SODIUM BLD-SCNC: 140 MEQ/L (ref 135–144)

## 2020-01-02 ENCOUNTER — HOSPITAL ENCOUNTER (OUTPATIENT)
Dept: WOUND CARE | Age: 85
Discharge: HOME | End: 2020-01-02
Payer: MEDICARE

## 2020-01-02 VITALS
RESPIRATION RATE: 18 BRPM | DIASTOLIC BLOOD PRESSURE: 72 MMHG | TEMPERATURE: 98 F | HEART RATE: 66 BPM | SYSTOLIC BLOOD PRESSURE: 114 MMHG

## 2020-01-02 PROBLEM — I87.331 CHRONIC VENOUS HYPERTENSION (IDIOPATHIC) WITH ULCER AND INFLAMMATION OF RIGHT LOWER EXTREMITY (CODE): Status: ACTIVE | Noted: 2020-01-02

## 2020-01-02 PROBLEM — L97.812 NON-PRESSURE CHRONIC ULCER OF OTHER PART OF RIGHT LOWER LEG WITH FAT LAYER EXPOSED (HCC): Status: ACTIVE | Noted: 2020-01-02

## 2020-01-02 PROCEDURE — 99213 OFFICE O/P EST LOW 20 MIN: CPT

## 2020-01-02 RX ORDER — TRIAMCINOLONE ACETONIDE 0.25 MG/G
OINTMENT TOPICAL
Qty: 80 G | Refills: 1 | Status: SHIPPED | OUTPATIENT
Start: 2020-01-02 | End: 2020-01-09

## 2020-01-14 ENCOUNTER — OFFICE VISIT (OUTPATIENT)
Dept: FAMILY MEDICINE CLINIC | Age: 85
End: 2020-01-14
Payer: MEDICARE

## 2020-01-14 VITALS
WEIGHT: 201.2 LBS | OXYGEN SATURATION: 96 % | TEMPERATURE: 98.1 F | RESPIRATION RATE: 16 BRPM | SYSTOLIC BLOOD PRESSURE: 114 MMHG | DIASTOLIC BLOOD PRESSURE: 80 MMHG | HEIGHT: 70 IN | HEART RATE: 102 BPM | BODY MASS INDEX: 28.8 KG/M2

## 2020-01-14 PROCEDURE — 99213 OFFICE O/P EST LOW 20 MIN: CPT | Performed by: PHYSICIAN ASSISTANT

## 2020-01-14 PROCEDURE — G8427 DOCREV CUR MEDS BY ELIG CLIN: HCPCS | Performed by: PHYSICIAN ASSISTANT

## 2020-01-14 PROCEDURE — G8417 CALC BMI ABV UP PARAM F/U: HCPCS | Performed by: PHYSICIAN ASSISTANT

## 2020-01-14 PROCEDURE — 1036F TOBACCO NON-USER: CPT | Performed by: PHYSICIAN ASSISTANT

## 2020-01-14 PROCEDURE — 1123F ACP DISCUSS/DSCN MKR DOCD: CPT | Performed by: PHYSICIAN ASSISTANT

## 2020-01-14 PROCEDURE — 4040F PNEUMOC VAC/ADMIN/RCVD: CPT | Performed by: PHYSICIAN ASSISTANT

## 2020-01-14 PROCEDURE — G8482 FLU IMMUNIZE ORDER/ADMIN: HCPCS | Performed by: PHYSICIAN ASSISTANT

## 2020-01-14 RX ORDER — AMOXICILLIN AND CLAVULANATE POTASSIUM 875; 125 MG/1; MG/1
1 TABLET, FILM COATED ORAL 2 TIMES DAILY
Qty: 20 TABLET | Refills: 0 | Status: SHIPPED | OUTPATIENT
Start: 2020-01-14 | End: 2020-01-24

## 2020-01-14 RX ORDER — GREEN TEA/HOODIA GORDONII 315-12.5MG
1 CAPSULE ORAL 2 TIMES DAILY
Qty: 60 TABLET | Refills: 0 | Status: SHIPPED | OUTPATIENT
Start: 2020-01-14 | End: 2020-02-13

## 2020-01-14 ASSESSMENT — ENCOUNTER SYMPTOMS
WHEEZING: 0
NAUSEA: 0
DIARRHEA: 0
BACK PAIN: 0
VOMITING: 0
SHORTNESS OF BREATH: 0
CONSTIPATION: 0
COUGH: 0

## 2020-01-14 ASSESSMENT — PATIENT HEALTH QUESTIONNAIRE - PHQ9
SUM OF ALL RESPONSES TO PHQ9 QUESTIONS 1 & 2: 0
SUM OF ALL RESPONSES TO PHQ QUESTIONS 1-9: 0
SUM OF ALL RESPONSES TO PHQ QUESTIONS 1-9: 0
2. FEELING DOWN, DEPRESSED OR HOPELESS: 0
1. LITTLE INTEREST OR PLEASURE IN DOING THINGS: 0

## 2020-01-16 ENCOUNTER — HOSPITAL ENCOUNTER (OUTPATIENT)
Dept: WOUND CARE | Age: 85
Discharge: HOME | End: 2020-01-16
Payer: MEDICARE

## 2020-01-16 VITALS
DIASTOLIC BLOOD PRESSURE: 72 MMHG | HEART RATE: 64 BPM | SYSTOLIC BLOOD PRESSURE: 142 MMHG | TEMPERATURE: 98 F | RESPIRATION RATE: 18 BRPM

## 2020-01-16 PROBLEM — I87.2 VENOUS INSUFFICIENCY OF BOTH LOWER EXTREMITIES: Status: ACTIVE | Noted: 2020-01-16

## 2020-01-16 PROCEDURE — 99212 OFFICE O/P EST SF 10 MIN: CPT

## 2020-01-16 PROCEDURE — 99203 OFFICE O/P NEW LOW 30 MIN: CPT | Performed by: NURSE PRACTITIONER

## 2020-01-16 NOTE — PLAN OF CARE
Problem: Wound:  Goal: Will show signs of wound healing; wound closure and no evidence of infection  Description  Will show signs of wound healing; wound closure and no evidence of infection  Outcome: Completed

## 2020-01-16 NOTE — DISCHARGE INSTR - COC
([facility] E11.22, N18.3    Coronary artery disease involving native coronary artery of native heart without angina pectoris I25.10    Paroxysmal atrial fibrillation ([facility]) I48.0    Essential hypertension I10    Type 2 diabetes mellitus ([facility]) E11.9    Wound of right leg S81.801A    Non-pressure chronic ulcer of other part of right lower leg with fat layer exposed ([facility] L97.812    Chronic venous hypertension (idiopathic) with ulcer and inflammation of right lower extremity (CODE) ([facility]) I87.331    Venous insufficiency of both lower extremities I87.2       Isolation/Infection:   Isolation          No Isolation        Patient Infection Status     None to display          Nurse Assessment:  Last Vital Signs: BP (!) 142/72   Pulse 64   Temp 98 °F (36.7 °C) (Temporal)   Resp 18     Last documented pain score (0-10 scale):    Last Weight:   Wt Readings from Last 1 Encounters:   01/14/20 201 lb 3.2 oz (91.3 kg)     Mental Status:  {IP PT MENTAL STATUS:68749}    IV Access:  { LYNNE IV ACCESS:546325915}    Nursing Mobility/ADLs:  Walking   {CHP DME AZSB:737849792}  Transfer  {CHP DME FPTX:531394817}  Bathing  {CHP DME PNBV:769331639}  Dressing  {CHP DME RPGY:603458691}  Toileting  {CHP DME NDMQ:652940413}  Feeding  {CHP DME QBDT:270401551}  Med Admin  {P DME OQDD:315307833}  Med Delivery   {[facility] MED Delivery:864147703}    Wound Care Documentation and Therapy:  Wound 12/04/19 Leg Right; Lower #2 (Active)   Wound Image   1/16/2020  2:12 PM   Wound Venous 1/16/2020  2:12 PM   Dressing/Treatment Collagen with Ag;Dry dressing 1/2/2020  2:14 PM   Wound Cleansed Rinsed/Irrigated with saline 1/2/2020  1:26 PM   Wound Length (cm) 0 cm 1/16/2020  2:12 PM   Wound Width (cm) 0 cm 1/16/2020  2:12 PM   Wound Depth (cm) 0 cm 1/16/2020  2:12 PM   Wound Surface Area (cm^2) 0 cm^2 1/16/2020  2:12 PM   Change in Wound Size % (l*w) 100 1/16/2020  2:12 PM   Wound Volume (cm^3) 0 cm^3 1/16/2020  2:12 PM   Wound Healing % 100 1/16/2020  2:12 Readmission:        0           Discharging to Facility/ Agency   · Name:   · Address:  · Phone:  · Fax:    Dialysis Facility (if applicable)   · Name:  · Address:  · Dialysis Schedule:  · Phone:  · Fax:    / signature: {Esignature:792280515}    PHYSICIAN SECTION    Prognosis: {Prognosis:3013456010}    Condition at Discharge: Tra Delgado Patient Condition:532872291}    Rehab Potential (if transferring to Rehab): {Prognosis:5541525495}    Recommended Labs or Other Treatments After Discharge: ***    Physician Certification: I certify the above information and transfer of Deshaun Kwong  is necessary for the continuing treatment of the diagnosis listed and that he requires {Admit to Appropriate Level of Care:29809} for {GREATER/LESS:260900640} 30 days.      Update Admission H&P: [facility]    PHYSICIAN SIGNATURE:  {Esignature:092379598}

## 2020-01-16 NOTE — PROGRESS NOTES
199 lb 6.4 oz (90.4 kg)   11/19/19 214 lb (97.1 kg)       PHYSICAL EXAM    Constitutional:   Well nourished and well developed. Appears neat and clean. Patient is alert, oriented x3, and in no apparent distress. Respiratory:  Respiratory effort is easy and symmetric bilaterally. Rate is normal at rest and on room air. Vascular:  Pedal Pulses is not palpable - but audible signal noted with doppler. Capillary refill is <3sec to digits bilateral.  Extremities continue with edema-1+. Neurological:  Gross and Light touch intact. Protective sensation intact. Dermatological:  Wound description noted in wound assessment. See nurses notes/picture. Previous wounds are healed. No drainage or open areas remain. Psychiatric:  Judgement and insight intact. Short and long term memory intact. No evidence of depression, anxiety, or agitation. Patient is calm, cooperative, and communicative. Appropriate interactions and affect. Assessment:      Problem List Items Addressed This Visit     Venous insufficiency of both lower extremities           Procedure Note  Indications:  Based on my examination of this patient's wound(s)/ulcer(s) today, debridement is not required to promote healing and evaluate the wound base. Wound 12/04/19 Leg Right; Lower #2 (Active)   Wound Image   1/16/2020  2:12 PM   Wound Venous 1/16/2020  2:12 PM   Dressing/Treatment Collagen with Ag;Dry dressing 1/2/2020  2:14 PM   Wound Cleansed Rinsed/Irrigated with saline 1/2/2020  1:26 PM   Wound Length (cm) 0 cm 1/16/2020  2:12 PM   Wound Width (cm) 0 cm 1/16/2020  2:12 PM   Wound Depth (cm) 0 cm 1/16/2020  2:12 PM   Wound Surface Area (cm^2) 0 cm^2 1/16/2020  2:12 PM   Change in Wound Size % (l*w) 100 1/16/2020  2:12 PM   Wound Volume (cm^3) 0 cm^3 1/16/2020  2:12 PM   Wound Healing % 100 1/16/2020  2:12 PM   Drainage Amount Small 1/2/2020  1:26 PM   Drainage Description Serosanguinous 1/2/2020  1:26 PM   Odor None 1/2/2020  1:26 PM prescribed. 10. Replace compression stockings every four to six months as needed to ensure proper fit. 11. Wear well-fitting shoes and leg garments. 12.Apply Spandagrip to (10-20) both lower leg, apply first thing in the morning, remove at bedtime, elevate legs as much as possible during the day. THANK YOU FOR ALLOWING US TO SERVE YOU.  PLEASE CALL IF YOU DEVELOP ANOTHER WOUND. [phone]     Electronically signed by TEJAS Browne NP on 1/16/2020 at 3:08 PM                    Electronically signed by TEJAS Browne NP on 1/16/2020 at 3:10 PM

## 2020-02-24 ENCOUNTER — OFFICE VISIT (OUTPATIENT)
Dept: FAMILY MEDICINE CLINIC | Age: 85
End: 2020-02-24
Payer: MEDICARE

## 2020-02-24 VITALS
HEIGHT: 70 IN | BODY MASS INDEX: 29.35 KG/M2 | HEART RATE: 70 BPM | SYSTOLIC BLOOD PRESSURE: 136 MMHG | RESPIRATION RATE: 20 BRPM | WEIGHT: 205 LBS | OXYGEN SATURATION: 96 % | DIASTOLIC BLOOD PRESSURE: 68 MMHG | TEMPERATURE: 98.1 F

## 2020-02-24 LAB — HBA1C MFR BLD: 8.4 %

## 2020-02-24 PROCEDURE — 83036 HEMOGLOBIN GLYCOSYLATED A1C: CPT | Performed by: PHYSICIAN ASSISTANT

## 2020-02-24 PROCEDURE — 4040F PNEUMOC VAC/ADMIN/RCVD: CPT | Performed by: PHYSICIAN ASSISTANT

## 2020-02-24 PROCEDURE — G8482 FLU IMMUNIZE ORDER/ADMIN: HCPCS | Performed by: PHYSICIAN ASSISTANT

## 2020-02-24 PROCEDURE — G8417 CALC BMI ABV UP PARAM F/U: HCPCS | Performed by: PHYSICIAN ASSISTANT

## 2020-02-24 PROCEDURE — 3052F HG A1C>EQUAL 8.0%<EQUAL 9.0%: CPT | Performed by: PHYSICIAN ASSISTANT

## 2020-02-24 PROCEDURE — G8427 DOCREV CUR MEDS BY ELIG CLIN: HCPCS | Performed by: PHYSICIAN ASSISTANT

## 2020-02-24 PROCEDURE — 1036F TOBACCO NON-USER: CPT | Performed by: PHYSICIAN ASSISTANT

## 2020-02-24 PROCEDURE — 99214 OFFICE O/P EST MOD 30 MIN: CPT | Performed by: PHYSICIAN ASSISTANT

## 2020-02-24 PROCEDURE — 1123F ACP DISCUSS/DSCN MKR DOCD: CPT | Performed by: PHYSICIAN ASSISTANT

## 2020-02-24 RX ORDER — GUAIFENESIN 600 MG/1
600 TABLET, EXTENDED RELEASE ORAL 2 TIMES DAILY
Qty: 60 TABLET | Refills: 1 | Status: SHIPPED | OUTPATIENT
Start: 2020-02-24 | End: 2020-03-25

## 2020-02-24 RX ORDER — CEFDINIR 300 MG/1
300 CAPSULE ORAL 2 TIMES DAILY
Qty: 20 CAPSULE | Refills: 0 | Status: CANCELLED | OUTPATIENT
Start: 2020-02-24 | End: 2020-03-05

## 2020-02-24 RX ORDER — ALBUTEROL SULFATE 90 UG/1
2 AEROSOL, METERED RESPIRATORY (INHALATION) 4 TIMES DAILY PRN
Qty: 1 INHALER | Refills: 0 | Status: CANCELLED | OUTPATIENT
Start: 2020-02-24

## 2020-02-24 ASSESSMENT — ENCOUNTER SYMPTOMS
WHEEZING: 1
SINUS PRESSURE: 0
SHORTNESS OF BREATH: 1
SORE THROAT: 0
BLOOD IN STOOL: 0

## 2020-02-24 NOTE — PROGRESS NOTES
Subjective  Ann Lucas, 80 y.o. male presents today with:  Chief Complaint   Patient presents with    Cough     with phelgm. .. sx x 3 weeks    Shortness of Breath    Fatigue       HPI  Patient is here with complaint of cough productive of white phlegm for the past 3 weeks states cough has lessened in bed and mucus is less as well  She has had had increased shortness of breath with exertion denies chest pain or palpitations  Also complaining of fatigue denies rectal bleeding black tarry stools    Review of Systems   Constitutional: Positive for fatigue. Negative for chills and fever. HENT: Negative for postnasal drip, sinus pressure and sore throat. Respiratory: Positive for shortness of breath and wheezing. Cardiovascular: Positive for leg swelling. Negative for chest pain and palpitations. Gastrointestinal: Negative for blood in stool. Neurological: Negative for dizziness and syncope. All other systems reviewed and are negative.         Past Medical History:   Diagnosis Date    Anemia     Atrial fibrillation ([address]     CAD (coronary artery disease)     Hyperlipidemia     Hypertension     Type II or unspecified type diabetes mellitus without mention of complication, not stated as uncontrolled      Past Surgical History:   Procedure Laterality Date    CATARACT REMOVAL WITH IMPLANT Right     2013 - cathy walters    FOOT FRACTURE SURGERY  1980s    work related - fell off a ladder    PTCA  2008    4 stents - jill walters    ROTATOR CUFF REPAIR Right 2009     Social History     Socioeconomic History    Marital status:      Spouse name: Not on file    Number of children: Not on file    Years of education: Not on file    Highest education level: Not on file   Occupational History    Not on file   Social Needs    Financial resource strain: Not on file    Food insecurity:     Worry: Not on file     Inability: Not on file    Transportation needs:     Medical: Not on file     Non-medical: Not on file   Tobacco Use    Smoking status: Never Smoker    Smokeless tobacco: Never Used   Substance and Sexual Activity    Alcohol use: No    Drug use: No    Sexual activity: Never   Lifestyle    Physical activity:     Days per week: Not on file     Minutes per session: Not on file    Stress: Not on file   Relationships    Social connections:     Talks on phone: Not on file     Gets together: Not on file     Attends [employer] service: Not on file     Active member of club or organization: Not on file     Attends meetings of clubs or organizations: Not on file     Relationship status: Not on file    Intimate partner violence:     Fear of current or ex partner: Not on file     Emotionally abused: Not on file     Physically abused: Not on file     Forced sexual activity: Not on file   Other Topics Concern    Not on file   Social History Narrative    Not on file     No family history on file. No Known Allergies  Current Outpatient Medications   Medication Sig Dispense Refill    guaiFENesin (MUCINEX) 600 MG extended release tablet Take 1 tablet by mouth 2 times daily 60 tablet 1    traZODone (DESYREL) 50 MG tablet Take 1 tablet by mouth nightly as needed for Sleep 30 tablet 5    lidocaine (LMX) 4 % cream Apply topically as needed.  45 g 2    ferrous sulfate 325 (65 Fe) MG tablet Take 1 tablet by mouth 2 times daily 60 tablet 0    loratadine (CLARITIN) 10 MG tablet Take 1 tablet by mouth daily 30 tablet 1    finasteride (PROSCAR) 5 MG tablet TAKE 1 TABLET EVERY DAY 90 tablet 3    apixaban (ELIQUIS) 2.5 MG TABS tablet Take 1 tablet by mouth 2 times daily 60 tablet 3    losartan (COZAAR) 50 MG tablet Take 1 tablet by mouth daily 30 tablet 3    torsemide (DEMADEX) 20 MG tablet Take 2 tablets by mouth daily (Patient taking differently: Take 20 mg by mouth daily Indications: Patient takes monday, wednesday, friday. ) 60 tablet 3    carvedilol (COREG) 12.5 MG tablet Take 1 tablet by mouth 2 times daily (with meals) 60 tablet 3    insulin glargine (LANTUS) 100 UNIT/ML injection vial Inject 40 Units into the skin nightly (Patient taking differently: Inject 35 Units into the skin nightly ) 1 vial 3    Insulin Aspart (NOVOLOG FLEXPEN SC) Inject 15 Units into the skin Daily with supper      vitamin B-12 (CYANOCOBALAMIN) 1000 MCG tablet Take 1,000 mcg by mouth daily      nitroGLYCERIN (NITROSTAT) 0.4 MG SL tablet Place 0.4 mg under the tongue every 5 minutes as needed for Chest pain up to max of 3 total doses. If no relief after 1 dose, call 911.  atorvastatin (LIPITOR) 80 MG tablet Take 1 tablet by mouth daily (Patient taking differently: Take 80 mg by mouth daily Takes  At hs) 90 tablet 1    omeprazole (PRILOSEC) 20 MG capsule Take 20 mg by mouth daily.  aspirin 81 MG tablet Take 81 mg by mouth daily. No current facility-administered medications for this visit. Objective    Vitals:    02/24/20 1316   BP: 136/68   Pulse: 70   Resp: 20   Temp: 98.1 °F (36.7 °C)   TempSrc: Oral   SpO2: 96%   Weight: 205 lb (93 kg)   Height: 5' 10\" (1.778 m)     Physical Exam  Constitutional:       General: He is not in acute distress. Appearance: He is not ill-appearing. HENT:      Head: Normocephalic and atraumatic. Ears:      Comments: Hearing aids     Nose: No congestion or rhinorrhea. Mouth/Throat:      Mouth: Mucous membranes are moist.      Pharynx: Oropharynx is clear. Eyes:      Conjunctiva/sclera: Conjunctivae normal.      Pupils: Pupils are equal, round, and reactive to light. Neck:      Musculoskeletal: Normal range of motion. Cardiovascular:      Rate and Rhythm: Normal rate and regular rhythm. Heart sounds: Murmur present. Pulmonary:      Effort: No respiratory distress. Breath sounds: Wheezing present. No rhonchi or rales. Comments: Faint late expiratory wheezes  Abdominal:      General: Bowel sounds are normal.      Palpations: Abdomen is soft. Musculoskeletal:         General: Swelling present. Comments: +1 edema bilat les  Wearing compression socks (spandagrip)   Skin:     General: Skin is warm and dry. Neurological:      General: No focal deficit present. Mental Status: He is alert and oriented to person, place, and time. Gait: Gait abnormal.      Deep Tendon Reflexes: Abnormal reflex: .rgvtemp. Comments: Ambulates with a walker                   Assessment & Plan    Diagnosis Orders   1. URI, acute     2. SOB (shortness of breath)     3. Type 2 diabetes mellitus with other specified complication, with long-term current use of insulin (HCC)  POCT glycosylated hemoglobin (Hb A1C)    a1c 8.4     4. Iron deficiency anemia secondary to blood loss (chronic)   Iron And Tibc   5. Coronary artery disease due to lipid rich plaque     6. Essential hypertension      controlled         Orders Placed This Encounter   Procedures    CBC With Auto Differential     Standing Status:   Future     Number of Occurrences:   1     Standing Expiration Date:   2/24/2021    Comprehensive Metabolic Panel     Standing Status:   Future     Number of Occurrences:   1     Standing Expiration Date:   2/24/2021    Iron And Tibc     Standing Status:   Future     Number of Occurrences:   1     Standing Expiration Date:   2/24/2021     Order Specific Question:   Is Patient Fasting? Answer:   n     Order Specific Question:   No of Hours? Answer:   na    POCT glycosylated hemoglobin (Hb A1C)     Orders Placed This Encounter   Medications    guaiFENesin (MUCINEX) 600 MG extended release tablet     Sig: Take 1 tablet by mouth 2 times daily     Dispense:  60 tablet     Refill:  1     There are no discontinued medications. Return in about 10 days (around 3/5/2020).     Xiomy Moreno PA-C

## 2020-02-26 RX ORDER — FERROUS SULFATE 325(65) MG
325 TABLET ORAL 2 TIMES DAILY
Qty: 60 TABLET | Refills: 1 | Status: SHIPPED | OUTPATIENT
Start: 2020-02-26 | End: 2020-04-13

## 2020-02-26 RX ORDER — DOCUSATE SODIUM 100 MG/1
100 CAPSULE, LIQUID FILLED ORAL 2 TIMES DAILY PRN
Qty: 60 CAPSULE | Refills: 1 | Status: SHIPPED | OUTPATIENT
Start: 2020-02-26 | End: 2020-04-27

## 2020-04-13 RX ORDER — FERROUS SULFATE 325(65) MG
TABLET ORAL
Qty: 180 TABLET | Refills: 3 | Status: SHIPPED | OUTPATIENT
Start: 2020-04-13 | End: 2021-01-01 | Stop reason: SDUPTHER

## 2020-04-28 ENCOUNTER — VIRTUAL VISIT (OUTPATIENT)
Dept: FAMILY MEDICINE CLINIC | Age: 85
End: 2020-04-28
Payer: MEDICARE

## 2020-04-28 PROCEDURE — 99442 PR PHYS/QHP TELEPHONE EVALUATION 11-20 MIN: CPT | Performed by: PHYSICIAN ASSISTANT

## 2020-04-28 NOTE — PROGRESS NOTES
2020     Angie Echevarria (:  1931) is a 80 y.o. male, here for evaluation of the following medical concerns:  sob  [facility]  Medicine telephone visit due to concern for exposure to [facility] at 19 (coronavirus). Patient is aware this is a billable visit. Patient states he has been doing well denies chest pain pressure shortness of breath he has been getting his exercise by moving around the house he is has some minimal swelling in his legs nothing significant wearing his compression stockings during the day which is helped quite a bit his fasting blood sugars have been in the 170s 180s admits to drinking quite a bit of tropical fruit juice    Review of Systems   Cardiovascular: Positive for leg swelling. All other systems reviewed and are negative. Prior to Visit Medications    Medication Sig Taking?  Authorizing Provider   KONRAD FERROUS SULFATE 325 (65 Fe) MG tablet take 1 tablet by mouth twice a day Yes Xiomy Moreno PA-C   finasteride (PROSCAR) 5 MG tablet TAKE 1 TABLET EVERY DAY Yes Moises Causey MD   apixaban (ELIQUIS) 2.5 MG TABS tablet Take 1 tablet by mouth 2 times daily Yes Rashaun Benson MD   losartan (COZAAR) 50 MG tablet Take 1 tablet by mouth daily Yes Rashaun Benson MD   torsemide (DEMADEX) 20 MG tablet Take 2 tablets by mouth daily  Patient taking differently: Take 20 mg by mouth daily  Yes Rashaun Benson MD   carvedilol (COREG) 12.5 MG tablet Take 1 tablet by mouth 2 times daily (with meals) Yes Rashaun Benson MD   insulin glargine (LANTUS) 100 UNIT/ML injection vial Inject 40 Units into the skin nightly Yes [address],    Insulin Aspart (NOVOLOG FLEXPEN SC) Inject 15 Units into the skin Daily with supper Yes Historical Provider, MD   vitamin B-12 (CYANOCOBALAMIN) 1000 MCG tablet Take 1,000 mcg by mouth daily Yes Historical Provider, MD   nitroGLYCERIN (NITROSTAT) 0.4 MG SL tablet Place 0.4 mg under the tongue every 5 minutes as needed for Chest pain up to max of 3 Encounter   Medication Reason    docusate sodium (COLACE) 100 MG capsule LIST CLEANUP    lidocaine (LMX) 4 % cream Therapy completed    loratadine (CLARITIN) 10 MG tablet LIST CLEANUP    traZODone (DESYREL) 50 MG tablet Therapy completed     Return in about 4 months (around 8/28/2020), or if symptoms worsen or fail to improve, for repeat labs, follow up on medicaiton. 11-20 min visit      An electronic signature was used to authenticate this note.     --Xiomy Moreno PA-C on 4/28/2020 at 2:16 PM

## 2020-06-01 RX ORDER — FINASTERIDE 5 MG/1
TABLET, FILM COATED ORAL
Qty: 90 TABLET | Refills: 3 | Status: SHIPPED | OUTPATIENT
Start: 2020-06-01 | End: 2021-01-01

## 2020-06-03 RX ORDER — FINASTERIDE 5 MG/1
TABLET, FILM COATED ORAL
Qty: 90 TABLET | Refills: 3 | OUTPATIENT
Start: 2020-06-03

## 2020-06-08 ASSESSMENT — PATIENT HEALTH QUESTIONNAIRE - PHQ9
SUM OF ALL RESPONSES TO PHQ QUESTIONS 1-9: 0
SUM OF ALL RESPONSES TO PHQ QUESTIONS 1-9: 0

## 2020-06-08 ASSESSMENT — LIFESTYLE VARIABLES: HOW OFTEN DO YOU HAVE A DRINK CONTAINING ALCOHOL: 0

## 2020-06-09 ENCOUNTER — VIRTUAL VISIT (OUTPATIENT)
Dept: FAMILY MEDICINE CLINIC | Age: 85
End: 2020-06-09
Payer: MEDICARE

## 2020-06-09 PROCEDURE — G0438 PPPS, INITIAL VISIT: HCPCS | Performed by: PHYSICIAN ASSISTANT

## 2020-06-09 PROCEDURE — 4040F PNEUMOC VAC/ADMIN/RCVD: CPT | Performed by: PHYSICIAN ASSISTANT

## 2020-06-09 PROCEDURE — 1123F ACP DISCUSS/DSCN MKR DOCD: CPT | Performed by: PHYSICIAN ASSISTANT

## 2020-06-09 RX ORDER — OMEPRAZOLE 20 MG/1
20 CAPSULE, DELAYED RELEASE ORAL DAILY
Qty: 90 CAPSULE | Refills: 3 | Status: SHIPPED | OUTPATIENT
Start: 2020-06-09 | End: 2021-01-01

## 2020-06-09 NOTE — PROGRESS NOTES
is 29.41 kg/m² as calculated from the following:    Height as of 20: 5' 10\" (1.778 m). Weight as of 20: 205 lb (93 kg). Physical Exam  Pulmonary:      Effort: Pulmonary effort is normal.   Neurological:      Mental Status: He is oriented to person, place, and time. Psychiatric:         Mood and Affect: Mood normal.         Thought Content: Thought content normal.         Judgment: Judgment normal.              Assessment & Plan    Diagnosis Orders   1. Routine general medical examination at a health care facility           No orders of the defined types were placed in this encounter. Orders Placed This Encounter   Medications    omeprazole (PRILOSEC) 20 MG delayed release capsule     Sig: Take 1 capsule by mouth daily     Dispense:  90 capsule     Refill:  3     Medications Discontinued During This Encounter   Medication Reason    nitroGLYCERIN (NITROSTAT) 0.4 MG SL tablet LIST CLEANUP    omeprazole (PRILOSEC) 20 MG capsule REORDER     Return for Medicare Annual Wellness Visit in 1 year. An electronic signature was used to authenticate this note. --Xiomy Moreno PA-C on 2020 at 2:10 PM  Medicare Annual Wellness Visit  Name: Natalia Ortiz Date: 2020   MRN: [id number] Sex: Male   Age: 80 y.o. Ethnicity: Non-/Non    : 1931 Race: Brandan Cevallos is here for Medicare AWV (AWV)    Screenings for behavioral, psychosocial and functional/safety risks, and cognitive dysfunction are all negative except as indicated below. These results, as well as other patient data from the [address] form, are documented in Flowsheets linked to this Encounter. No Known Allergies      Prior to Visit Medications    Medication Sig Taking?  Authorizing Provider   omeprazole (PRILOSEC) 20 MG delayed release capsule Take 1 capsule by mouth daily Yes Xiomy Moreno PA-C   finasteride (PROSCAR) 5 MG tablet TAKE 1 TABLET EVERY DAY Yes General (Physician Assistant)  Ilsa Oviedo PA-C as PCP - [facility] Empaneled Provider  Vincent Han MD (Urology)    Wt Readings from Last 3 Encounters:   02/24/20 205 lb (93 kg)   01/14/20 201 lb 3.2 oz (91.3 kg)   12/26/19 199 lb 6.4 oz (90.4 kg)     There were no vitals filed for this visit. There is no height or weight on file to calculate BMI. Based upon direct observation of the patient, evaluation of cognition reveals recent and remote memory intact. Patient's complete Health Risk Assessment and screening values have been reviewed and are found in Flowsheets. The following problems were reviewed today and where indicated follow up appointments were made and/or referrals ordered. Positive Risk Factor Screenings with Interventions:     Cognitive: Words recalled: 2 Words Recalled  Total Score Interpretation: Positive Mini-Cog  Did the patient refuse to take the cognition test?: No  Cognitive Impairment Interventions:  · Patient declines any further evaluation/treatment for cognitive impairment    Health Habits/Nutrition:  Health Habits/Nutrition  Do you exercise for at least 20 minutes 2-3 times per week?: Yes  Have you lost any weight without trying in the past 3 months?: No  Do you eat fewer than 2 meals per day?: No  Have you seen a dentist within the past year?: (!) No  There is no height or weight on file to calculate BMI.   Health Habits/Nutrition Interventions:  · full upper and lower dentures    Hearing/Vision:  No exam data present  Hearing/Vision  Do you or your family notice any trouble with your hearing?: (!) Yes  Do you have difficulty driving, watching TV, or doing any of your daily activities because of your eyesight?: No  Have you had an eye exam within the past year?: Yes  Hearing/Vision Interventions:  · has hearing aides    Safety:  Safety  Do you have working smoke detectors?: Yes  Have all throw rugs been removed or fastened?: (!) No  Do you have non-slip mats or facility    Other orders  -     omeprazole (PRILOSEC) 20 MG delayed release capsule; Take 1 capsule by mouth daily              Jay Goff is a 80 y.o. male being evaluated by a Virtual Visit (video and audio) encounter to address concerns as mentioned above. A caregiver was present when appropriate. Due to this being a TeleHealth encounter (During S- public health emergency), evaluation of the following organ systems was limited: Vitals/Constitutional/EENT/Resp/CV/GI//MS/Neuro/Skin/Heme-Lymph-Imm. Pursuant to the emergency declaration under the [address] [place] [address], [address] [place] [address] authority and the [employer] Act, this Virtual Visit was conducted with patient's (and/or legal guardian's) consent, to reduce the patient's risk of exposure to COVID-19 and provide necessary medical care. The patient (and/or legal guardian) has also been advised to contact this office for worsening conditions or problems, and seek emergency medical treatment and/or call 911 if deemed necessary. Patient identification was verified at the start of the visit: Yes    Services were provided through a video synchronous discussion virtually to substitute for in-person clinic visit. Patient and provider were located at their individual homes. --Xiomy Moreno PA-C on 2020 at 2:10 PM    An electronic signature was used to authenticate this note. Medicare Annual Wellness Visit  Name: Elvis Villareal Date: 2020   MRN: [id number] Sex: Male   Age: 80 y.o. Ethnicity: Non-/Non    : 1931 Race: Bruce Alamo is here for Medicare AWV (AWV)    Screenings for behavioral, psychosocial and functional/safety risks, and cognitive dysfunction are all negative except as indicated below.  These results, as well as other patient data from the Health Risk Assessment form, are documented in Flowsheets linked to exam data present  Hearing/Vision  Do you or your family notice any trouble with your hearing?: (!) Yes  Do you have difficulty driving, watching TV, or doing any of your daily activities because of your eyesight?: No  Have you had an eye exam within the past year?: Yes  Hearing/Vision Interventions:  · has hearing aides    Safety:  Safety  Do you have working smoke detectors?: Yes  Have all throw rugs been removed or fastened?: (!) No  Do you have non-slip mats or surfaces in all bathtubs/showers?: Yes  Do all of your stairways have a railing or banister?: (no stairs)  Are your doorways, halls and stairs free of clutter?: Yes  Do you always fasten your seatbelt when you are in a car?: Yes  Safety Interventions:  · Home safety tips provided    Personalized Preventive Plan   Current Health Maintenance Status  Immunization History   Administered Date(s) Administered    Influenza A ([id number]) Vaccine IM 01/19/2010    Influenza Vaccine, unspecified formulation 10/27/2009, 09/30/2010, 09/22/2011, 09/19/2012, 09/25/2013, 09/18/2014    Influenza, High Dose (Fluzone 65 yrs and older) 10/20/2015, 09/27/2016, 10/11/2017    Influenza, Hernandez Gola, 6 mo and older, IM, PF (Flulaval, Fluarix) 09/28/2018    Influenza, Triv, inactivated, subunit, adjuvanted, IM (Fluad 65 yrs and older) 10/29/2019    Pneumococcal Conjugate 13-valent ([id number]) 05/13/2015    Pneumococcal Conjugate Vaccine 01/29/2001    Pneumococcal Polysaccharide ([id number]) 01/29/2001    Pneumococcal Vaccine 01/29/2001        Health Maintenance   Topic Date Due    Annual Wellness Visit (AWV)  05/29/2019    DTaP/Tdap/Td vaccine (1 - Tdap) 09/26/2020 (Originally 11/30/1950)    Shingles Vaccine (1 of 2) 09/26/2020 (Originally 11/30/1981)    Pneumococcal 65+ years Vaccine (2 of 2 - PPSV23) 09/26/2020 (Originally 5/13/2016)    Lipid screen  08/06/2020    Potassium monitoring  02/24/2021    Creatinine monitoring  02/24/2021    Flu vaccine  Completed   

## 2020-06-23 ENCOUNTER — OFFICE VISIT (OUTPATIENT)
Dept: UROLOGY | Age: 85
End: 2020-06-23
Payer: MEDICARE

## 2020-06-23 VITALS
SYSTOLIC BLOOD PRESSURE: 132 MMHG | HEART RATE: 63 BPM | HEIGHT: 70 IN | WEIGHT: 200 LBS | DIASTOLIC BLOOD PRESSURE: 70 MMHG | BODY MASS INDEX: 28.63 KG/M2

## 2020-06-23 PROCEDURE — 4040F PNEUMOC VAC/ADMIN/RCVD: CPT | Performed by: UROLOGY

## 2020-06-23 PROCEDURE — 99213 OFFICE O/P EST LOW 20 MIN: CPT | Performed by: UROLOGY

## 2020-06-23 PROCEDURE — 51741 ELECTRO-UROFLOWMETRY FIRST: CPT | Performed by: UROLOGY

## 2020-06-23 PROCEDURE — G8427 DOCREV CUR MEDS BY ELIG CLIN: HCPCS | Performed by: UROLOGY

## 2020-06-23 PROCEDURE — 1036F TOBACCO NON-USER: CPT | Performed by: UROLOGY

## 2020-06-23 PROCEDURE — 51798 US URINE CAPACITY MEASURE: CPT | Performed by: UROLOGY

## 2020-06-23 PROCEDURE — 1123F ACP DISCUSS/DSCN MKR DOCD: CPT | Performed by: UROLOGY

## 2020-06-23 PROCEDURE — G8417 CALC BMI ABV UP PARAM F/U: HCPCS | Performed by: UROLOGY

## 2020-06-23 ASSESSMENT — ENCOUNTER SYMPTOMS: ABDOMINAL PAIN: 0

## 2020-11-03 PROBLEM — I25.10 CAD (CORONARY ARTERY DISEASE): Status: RESOLVED | Noted: 2020-01-01 | Resolved: 2020-01-01

## 2020-11-03 PROBLEM — I10 HYPERTENSION: Status: RESOLVED | Noted: 2020-01-01 | Resolved: 2020-01-01

## 2021-01-01 ENCOUNTER — APPOINTMENT (OUTPATIENT)
Dept: MRI IMAGING | Age: 86
DRG: 189 | End: 2021-01-01
Payer: MEDICARE

## 2021-01-01 ENCOUNTER — APPOINTMENT (OUTPATIENT)
Dept: ULTRASOUND IMAGING | Age: 86
DRG: 189 | End: 2021-01-01
Payer: MEDICARE

## 2021-01-01 ENCOUNTER — APPOINTMENT (OUTPATIENT)
Dept: CT IMAGING | Age: 86
DRG: 813 | End: 2021-01-01
Attending: FAMILY MEDICINE
Payer: MEDICARE

## 2021-01-01 ENCOUNTER — HOSPITAL ENCOUNTER (OUTPATIENT)
Dept: GENERAL RADIOLOGY | Age: 86
Discharge: HOME | End: 2021-04-22
Payer: MEDICARE

## 2021-01-01 ENCOUNTER — HOSPITAL ENCOUNTER (INPATIENT)
Age: 86
LOS: 4 days | Discharge: NURSING, REHAB OR LONG-TERM CARE | DRG: 189 | End: 2021-08-26
Attending: EMERGENCY MEDICINE | Admitting: INTERNAL MEDICINE
Payer: MEDICARE

## 2021-01-01 ENCOUNTER — APPOINTMENT (OUTPATIENT)
Dept: GENERAL RADIOLOGY | Age: 86
DRG: 640 | End: 2021-01-01
Attending: INTERNAL MEDICINE
Payer: MEDICARE

## 2021-01-01 ENCOUNTER — APPOINTMENT (OUTPATIENT)
Dept: CT IMAGING | Age: 86
DRG: 189 | End: 2021-01-01
Payer: MEDICARE

## 2021-01-01 ENCOUNTER — TELEPHONE (OUTPATIENT)
Dept: FAMILY MEDICINE CLINIC | Age: 86
End: 2021-01-01

## 2021-01-01 ENCOUNTER — APPOINTMENT (OUTPATIENT)
Dept: GENERAL RADIOLOGY | Age: 86
DRG: 189 | End: 2021-01-01
Payer: MEDICARE

## 2021-01-01 ENCOUNTER — HOSPITAL ENCOUNTER (INPATIENT)
Age: 86
LOS: 1 days | DRG: 813 | End: 2021-08-29
Attending: FAMILY MEDICINE | Admitting: INTERNAL MEDICINE
Payer: MEDICARE

## 2021-01-01 ENCOUNTER — OFFICE VISIT (OUTPATIENT)
Dept: FAMILY MEDICINE CLINIC | Age: 86
End: 2021-01-01
Payer: MEDICARE

## 2021-01-01 ENCOUNTER — HOSPITAL ENCOUNTER (INPATIENT)
Age: 86
LOS: 3 days | Discharge: ANOTHER HOSPITAL | DRG: 640 | End: 2021-08-29
Attending: INTERNAL MEDICINE | Admitting: INTERNAL MEDICINE
Payer: MEDICARE

## 2021-01-01 VITALS
SYSTOLIC BLOOD PRESSURE: 110 MMHG | HEART RATE: 88 BPM | BODY MASS INDEX: 30.24 KG/M2 | WEIGHT: 211.2 LBS | DIASTOLIC BLOOD PRESSURE: 58 MMHG | TEMPERATURE: 98.1 F | RESPIRATION RATE: 22 BRPM | HEIGHT: 70 IN | OXYGEN SATURATION: 97 %

## 2021-01-01 VITALS
TEMPERATURE: 97.6 F | BODY MASS INDEX: 29.35 KG/M2 | SYSTOLIC BLOOD PRESSURE: 122 MMHG | DIASTOLIC BLOOD PRESSURE: 64 MMHG | WEIGHT: 205 LBS | HEART RATE: 58 BPM | OXYGEN SATURATION: 96 % | HEIGHT: 70 IN

## 2021-01-01 VITALS
BODY MASS INDEX: 29.35 KG/M2 | HEIGHT: 70 IN | SYSTOLIC BLOOD PRESSURE: 97 MMHG | TEMPERATURE: 99 F | RESPIRATION RATE: 18 BRPM | HEART RATE: 97 BPM | OXYGEN SATURATION: 100 % | WEIGHT: 205 LBS | DIASTOLIC BLOOD PRESSURE: 41 MMHG

## 2021-01-01 VITALS
OXYGEN SATURATION: 93 % | TEMPERATURE: 98.3 F | BODY MASS INDEX: 29.41 KG/M2 | DIASTOLIC BLOOD PRESSURE: 60 MMHG | HEIGHT: 70 IN | SYSTOLIC BLOOD PRESSURE: 100 MMHG | HEART RATE: 64 BPM

## 2021-01-01 DIAGNOSIS — M79.604 RIGHT LEG PAIN: ICD-10-CM

## 2021-01-01 DIAGNOSIS — L03.119 CELLULITIS OF LOWER EXTREMITY, UNSPECIFIED LATERALITY: ICD-10-CM

## 2021-01-01 DIAGNOSIS — T14.8XXA WOUND INFECTION: Primary | ICD-10-CM

## 2021-01-01 DIAGNOSIS — S81.802D LEG WOUND, LEFT, SUBSEQUENT ENCOUNTER: Primary | ICD-10-CM

## 2021-01-01 DIAGNOSIS — L85.3 XEROSIS OF SKIN: ICD-10-CM

## 2021-01-01 DIAGNOSIS — N18.30 CKD STAGE 3 DUE TO TYPE 2 DIABETES MELLITUS (HCC): ICD-10-CM

## 2021-01-01 DIAGNOSIS — S81.801D LEG WOUND, RIGHT, SUBSEQUENT ENCOUNTER: ICD-10-CM

## 2021-01-01 DIAGNOSIS — E78.5 HYPERLIPIDEMIA, UNSPECIFIED HYPERLIPIDEMIA TYPE: ICD-10-CM

## 2021-01-01 DIAGNOSIS — R06.02 SHORTNESS OF BREATH: ICD-10-CM

## 2021-01-01 DIAGNOSIS — I25.10 CORONARY ARTERY DISEASE INVOLVING NATIVE CORONARY ARTERY OF NATIVE HEART WITHOUT ANGINA PECTORIS: ICD-10-CM

## 2021-01-01 DIAGNOSIS — I87.2 VENOUS INSUFFICIENCY OF BOTH LOWER EXTREMITIES: ICD-10-CM

## 2021-01-01 DIAGNOSIS — D50.9 IRON DEFICIENCY ANEMIA, UNSPECIFIED IRON DEFICIENCY ANEMIA TYPE: ICD-10-CM

## 2021-01-01 DIAGNOSIS — S81.801D WOUND OF RIGHT LOWER EXTREMITY, SUBSEQUENT ENCOUNTER: ICD-10-CM

## 2021-01-01 DIAGNOSIS — E11.69 TYPE 2 DIABETES MELLITUS WITH OTHER SPECIFIED COMPLICATION, UNSPECIFIED WHETHER LONG TERM INSULIN USE (HCC): Primary | ICD-10-CM

## 2021-01-01 DIAGNOSIS — S81.802S LEG WOUND, LEFT, SEQUELA: Primary | ICD-10-CM

## 2021-01-01 DIAGNOSIS — E87.5 HYPERKALEMIA: Primary | ICD-10-CM

## 2021-01-01 DIAGNOSIS — N17.9 AKI (ACUTE KIDNEY INJURY) (HCC): ICD-10-CM

## 2021-01-01 DIAGNOSIS — I10 ESSENTIAL HYPERTENSION: ICD-10-CM

## 2021-01-01 DIAGNOSIS — E11.22 CKD STAGE 3 DUE TO TYPE 2 DIABETES MELLITUS (HCC): ICD-10-CM

## 2021-01-01 DIAGNOSIS — L08.9 WOUND INFECTION: Primary | ICD-10-CM

## 2021-01-01 LAB
ABO/RH: NORMAL
ALBUMIN SERPL-MCNC: 2.7 G/DL (ref 3.5–4.6)
ALBUMIN SERPL-MCNC: 2.9 G/DL (ref 3.5–4.6)
ALBUMIN SERPL-MCNC: 3 G/DL (ref 3.5–4.6)
ALBUMIN SERPL-MCNC: 3.1 G/DL (ref 3.5–4.6)
ALBUMIN SERPL-MCNC: 3.7 G/DL (ref 3.5–4.6)
ALP BLD-CCNC: 160 U/L (ref 35–104)
ALP BLD-CCNC: 161 U/L (ref 35–104)
ALP BLD-CCNC: 167 U/L (ref 35–104)
ALP BLD-CCNC: 176 U/L (ref 35–104)
ALP BLD-CCNC: 195 U/L (ref 35–104)
ALT SERPL-CCNC: 43 U/L (ref 0–41)
ALT SERPL-CCNC: 44 U/L (ref 0–41)
ALT SERPL-CCNC: 44 U/L (ref 0–41)
ALT SERPL-CCNC: 45 U/L (ref 0–41)
ALT SERPL-CCNC: 49 U/L (ref 0–41)
ANION GAP SERPL CALCULATED.3IONS-SCNC: 10 MEQ/L (ref 9–15)
ANION GAP SERPL CALCULATED.3IONS-SCNC: 10 MEQ/L (ref 9–15)
ANION GAP SERPL CALCULATED.3IONS-SCNC: 11 MEQ/L (ref 9–15)
ANION GAP SERPL CALCULATED.3IONS-SCNC: 12 MEQ/L (ref 9–15)
ANION GAP SERPL CALCULATED.3IONS-SCNC: 12 MEQ/L (ref 9–15)
ANION GAP SERPL CALCULATED.3IONS-SCNC: 13 MEQ/L (ref 9–15)
ANION GAP SERPL CALCULATED.3IONS-SCNC: 7 MEQ/L (ref 9–15)
ANION GAP SERPL CALCULATED.3IONS-SCNC: 8 MEQ/L (ref 9–15)
ANTIBODY SCREEN: NORMAL
APTT: 39.3 SEC (ref 24.4–36.8)
AST SERPL-CCNC: 36 U/L (ref 0–40)
AST SERPL-CCNC: 52 U/L (ref 0–40)
AST SERPL-CCNC: 53 U/L (ref 0–40)
AST SERPL-CCNC: 58 U/L (ref 0–40)
AST SERPL-CCNC: 77 U/L (ref 0–40)
BACTERIA: NEGATIVE /HPF
BASE EXCESS ARTERIAL: 1 (ref -3–3)
BASE EXCESS ARTERIAL: 2 (ref -3–3)
BASE EXCESS ARTERIAL: 4 (ref -3–3)
BASE EXCESS ARTERIAL: 4 (ref -3–3)
BASE EXCESS ARTERIAL: 5 (ref -3–3)
BASE EXCESS ARTERIAL: 7 (ref -3–3)
BASE EXCESS ARTERIAL: 7 (ref -3–3)
BASOPHILS ABSOLUTE: 0 K/UL (ref 0–0.2)
BASOPHILS ABSOLUTE: 0.1 K/UL (ref 0–0.1)
BASOPHILS ABSOLUTE: 0.1 K/UL (ref 0–0.2)
BASOPHILS RELATIVE PERCENT: 0.5 %
BASOPHILS RELATIVE PERCENT: 0.6 % (ref 0.2–1.2)
BASOPHILS RELATIVE PERCENT: 0.7 %
BASOPHILS RELATIVE PERCENT: 0.8 %
BASOPHILS RELATIVE PERCENT: 0.9 %
BASOPHILS RELATIVE PERCENT: 0.9 %
BILIRUB SERPL-MCNC: 0.4 MG/DL (ref 0.2–0.7)
BILIRUB SERPL-MCNC: 0.5 MG/DL (ref 0.2–0.7)
BILIRUB SERPL-MCNC: 0.6 MG/DL (ref 0.2–0.7)
BILIRUB SERPL-MCNC: 0.7 MG/DL (ref 0.2–0.7)
BILIRUB SERPL-MCNC: 0.7 MG/DL (ref 0.2–0.7)
BILIRUBIN URINE: NEGATIVE
BLOOD BANK DISPENSE STATUS: NORMAL
BLOOD BANK DISPENSE STATUS: NORMAL
BLOOD BANK PRODUCT CODE: NORMAL
BLOOD BANK PRODUCT CODE: NORMAL
BLOOD CULTURE, ROUTINE: NORMAL
BLOOD CULTURE, ROUTINE: NORMAL
BLOOD, URINE: ABNORMAL
BPU ID: NORMAL
BPU ID: NORMAL
BUN BLDV-MCNC: 32 MG/DL (ref 8–23)
BUN BLDV-MCNC: 46 MG/DL (ref 8–23)
BUN BLDV-MCNC: 47 MG/DL (ref 8–23)
BUN BLDV-MCNC: 48 MG/DL (ref 8–23)
BUN BLDV-MCNC: 48 MG/DL (ref 8–23)
BUN BLDV-MCNC: 49 MG/DL (ref 8–23)
BUN BLDV-MCNC: 50 MG/DL (ref 8–23)
BUN BLDV-MCNC: 50 MG/DL (ref 8–23)
BUN BLDV-MCNC: 51 MG/DL (ref 8–23)
BUN BLDV-MCNC: 51 MG/DL (ref 8–23)
BUN BLDV-MCNC: 58 MG/DL (ref 8–23)
BUN BLDV-MCNC: 75 MG/DL (ref 8–23)
CALCIUM IONIZED: 1.01 MMOL/L (ref 1.12–1.32)
CALCIUM IONIZED: 1.19 MMOL/L (ref 1.12–1.32)
CALCIUM IONIZED: 1.21 MMOL/L (ref 1.12–1.32)
CALCIUM IONIZED: 1.21 MMOL/L (ref 1.12–1.32)
CALCIUM IONIZED: 1.22 MMOL/L (ref 1.12–1.32)
CALCIUM IONIZED: 1.22 MMOL/L (ref 1.12–1.32)
CALCIUM IONIZED: 1.23 MMOL/L (ref 1.12–1.32)
CALCIUM IONIZED: 1.24 MMOL/L (ref 1.12–1.32)
CALCIUM IONIZED: 1.27 MMOL/L (ref 1.12–1.32)
CALCIUM SERPL-MCNC: 8.2 MG/DL (ref 8.5–9.9)
CALCIUM SERPL-MCNC: 8.3 MG/DL (ref 8.5–9.9)
CALCIUM SERPL-MCNC: 8.4 MG/DL (ref 8.5–9.9)
CALCIUM SERPL-MCNC: 8.6 MG/DL (ref 8.5–9.9)
CALCIUM SERPL-MCNC: 8.8 MG/DL (ref 8.5–9.9)
CALCIUM SERPL-MCNC: 8.8 MG/DL (ref 8.5–9.9)
CALCIUM SERPL-MCNC: 9 MG/DL (ref 8.5–9.9)
CALCIUM SERPL-MCNC: 9.1 MG/DL (ref 8.5–9.9)
CALCIUM SERPL-MCNC: 9.4 MG/DL (ref 8.5–9.9)
CALCIUM SERPL-MCNC: 9.5 MG/DL (ref 8.5–9.9)
CHLORIDE BLD-SCNC: 101 MEQ/L (ref 95–107)
CHLORIDE BLD-SCNC: 103 MEQ/L (ref 95–107)
CHLORIDE BLD-SCNC: 104 MEQ/L (ref 95–107)
CHLORIDE BLD-SCNC: 105 MEQ/L (ref 95–107)
CHLORIDE BLD-SCNC: 106 MEQ/L (ref 95–107)
CHLORIDE BLD-SCNC: 107 MEQ/L (ref 95–107)
CHLORIDE BLD-SCNC: 108 MEQ/L (ref 95–107)
CHLORIDE BLD-SCNC: 94 MEQ/L (ref 95–107)
CHLORIDE BLD-SCNC: 97 MEQ/L (ref 95–107)
CHLORIDE BLD-SCNC: 99 MEQ/L (ref 95–107)
CHOLESTEROL, FASTING: 86 MG/DL (ref 0–199)
CLARITY: ABNORMAL
CO2: 24 MEQ/L (ref 20–31)
CO2: 27 MEQ/L (ref 20–31)
CO2: 28 MEQ/L (ref 20–31)
CO2: 29 MEQ/L (ref 20–31)
CO2: 30 MEQ/L (ref 20–31)
CO2: 32 MEQ/L (ref 20–31)
CO2: 33 MEQ/L (ref 20–31)
CO2: 33 MEQ/L (ref 20–31)
CO2: 35 MEQ/L (ref 20–31)
COLOR: ABNORMAL
CREAT SERPL-MCNC: 1.59 MG/DL (ref 0.7–1.2)
CREAT SERPL-MCNC: 2.17 MG/DL (ref 0.7–1.2)
CREAT SERPL-MCNC: 2.22 MG/DL (ref 0.7–1.2)
CREAT SERPL-MCNC: 2.29 MG/DL (ref 0.7–1.2)
CREAT SERPL-MCNC: 2.29 MG/DL (ref 0.7–1.2)
CREAT SERPL-MCNC: 2.33 MG/DL (ref 0.7–1.2)
CREAT SERPL-MCNC: 2.47 MG/DL (ref 0.7–1.2)
CREAT SERPL-MCNC: 2.5 MG/DL (ref 0.7–1.2)
CREAT SERPL-MCNC: 2.52 MG/DL (ref 0.7–1.2)
CREAT SERPL-MCNC: 2.57 MG/DL (ref 0.7–1.2)
CREAT SERPL-MCNC: 2.68 MG/DL (ref 0.7–1.2)
CREAT SERPL-MCNC: 2.69 MG/DL (ref 0.7–1.2)
CREATININE URINE: 68.5 MG/DL
CULTURE, BLOOD 2: NORMAL
DESCRIPTION BLOOD BANK: NORMAL
DESCRIPTION BLOOD BANK: NORMAL
EKG ATRIAL RATE: 78 BPM
EKG ATRIAL RATE: 79 BPM
EKG P AXIS: 102 DEGREES
EKG P AXIS: 113 DEGREES
EKG P-R INTERVAL: 144 MS
EKG P-R INTERVAL: 160 MS
EKG Q-T INTERVAL: 390 MS
EKG Q-T INTERVAL: 390 MS
EKG QRS DURATION: 110 MS
EKG QRS DURATION: 94 MS
EKG QTC CALCULATION (BAZETT): 444 MS
EKG QTC CALCULATION (BAZETT): 447 MS
EKG R AXIS: 79 DEGREES
EKG R AXIS: 92 DEGREES
EKG T AXIS: 164 DEGREES
EKG T AXIS: 236 DEGREES
EKG VENTRICULAR RATE: 78 BPM
EKG VENTRICULAR RATE: 79 BPM
EOSINOPHILS ABSOLUTE: 0 K/UL (ref 0–0.7)
EOSINOPHILS ABSOLUTE: 0.2 K/UL (ref 0–0.7)
EOSINOPHILS ABSOLUTE: 0.2 K/UL (ref 0–0.7)
EOSINOPHILS ABSOLUTE: 0.3 K/UL (ref 0–0.5)
EOSINOPHILS RELATIVE PERCENT: 0.2 %
EOSINOPHILS RELATIVE PERCENT: 0.2 %
EOSINOPHILS RELATIVE PERCENT: 0.6 %
EOSINOPHILS RELATIVE PERCENT: 2.3 % (ref 0.8–7)
EOSINOPHILS RELATIVE PERCENT: 2.5 %
EOSINOPHILS RELATIVE PERCENT: 2.7 %
EPITHELIAL CELLS, UA: ABNORMAL /HPF (ref 0–5)
FERRITIN: 124.6 NG/ML (ref 30–400)
FERRITIN: 77.7 NG/ML (ref 30–400)
FOLATE: 6.4 NG/ML (ref 7.3–26.1)
FOLATE: 6.9 NG/ML (ref 7.3–26.1)
GFR AFRICAN AMERICAN: 23
GFR AFRICAN AMERICAN: 25
GFR AFRICAN AMERICAN: 26
GFR AFRICAN AMERICAN: 26
GFR AFRICAN AMERICAN: 27
GFR AFRICAN AMERICAN: 27
GFR AFRICAN AMERICAN: 27.1
GFR AFRICAN AMERICAN: 27.2
GFR AFRICAN AMERICAN: 28.6
GFR AFRICAN AMERICAN: 29.3
GFR AFRICAN AMERICAN: 29.5
GFR AFRICAN AMERICAN: 29.9
GFR AFRICAN AMERICAN: 31
GFR AFRICAN AMERICAN: 32
GFR AFRICAN AMERICAN: 32.7
GFR AFRICAN AMERICAN: 32.7
GFR AFRICAN AMERICAN: 33.9
GFR AFRICAN AMERICAN: 34.8
GFR AFRICAN AMERICAN: 49.8
GFR NON-AFRICAN AMERICAN: 19
GFR NON-AFRICAN AMERICAN: 21
GFR NON-AFRICAN AMERICAN: 22
GFR NON-AFRICAN AMERICAN: 22
GFR NON-AFRICAN AMERICAN: 22.4
GFR NON-AFRICAN AMERICAN: 22.5
GFR NON-AFRICAN AMERICAN: 23.6
GFR NON-AFRICAN AMERICAN: 24.2
GFR NON-AFRICAN AMERICAN: 24.4
GFR NON-AFRICAN AMERICAN: 24.7
GFR NON-AFRICAN AMERICAN: 26
GFR NON-AFRICAN AMERICAN: 26.5
GFR NON-AFRICAN AMERICAN: 27
GFR NON-AFRICAN AMERICAN: 27
GFR NON-AFRICAN AMERICAN: 28
GFR NON-AFRICAN AMERICAN: 28.7
GFR NON-AFRICAN AMERICAN: 41.1
GLOBULIN: 2.6 G/DL (ref 2.3–3.5)
GLOBULIN: 2.8 G/DL (ref 2.3–3.5)
GLOBULIN: 2.8 G/DL (ref 2.3–3.5)
GLUCOSE BLD-MCNC: 116 MG/DL (ref 60–115)
GLUCOSE BLD-MCNC: 124 MG/DL (ref 60–115)
GLUCOSE BLD-MCNC: 130 MG/DL (ref 70–99)
GLUCOSE BLD-MCNC: 146 MG/DL (ref 70–99)
GLUCOSE BLD-MCNC: 148 MG/DL (ref 60–115)
GLUCOSE BLD-MCNC: 149 MG/DL (ref 60–115)
GLUCOSE BLD-MCNC: 174 MG/DL (ref 70–99)
GLUCOSE BLD-MCNC: 175 MG/DL (ref 60–115)
GLUCOSE BLD-MCNC: 183 MG/DL (ref 60–115)
GLUCOSE BLD-MCNC: 184 MG/DL (ref 70–99)
GLUCOSE BLD-MCNC: 187 MG/DL (ref 70–99)
GLUCOSE BLD-MCNC: 190 MG/DL (ref 60–115)
GLUCOSE BLD-MCNC: 194 MG/DL (ref 60–115)
GLUCOSE BLD-MCNC: 194 MG/DL (ref 70–99)
GLUCOSE BLD-MCNC: 195 MG/DL (ref 70–99)
GLUCOSE BLD-MCNC: 205 MG/DL (ref 60–115)
GLUCOSE BLD-MCNC: 206 MG/DL (ref 60–115)
GLUCOSE BLD-MCNC: 219 MG/DL (ref 70–99)
GLUCOSE BLD-MCNC: 221 MG/DL (ref 70–99)
GLUCOSE BLD-MCNC: 225 MG/DL (ref 60–115)
GLUCOSE BLD-MCNC: 226 MG/DL (ref 60–115)
GLUCOSE BLD-MCNC: 227 MG/DL (ref 60–115)
GLUCOSE BLD-MCNC: 234 MG/DL (ref 60–115)
GLUCOSE BLD-MCNC: 235 MG/DL (ref 60–115)
GLUCOSE BLD-MCNC: 237 MG/DL (ref 60–115)
GLUCOSE BLD-MCNC: 238 MG/DL (ref 70–99)
GLUCOSE BLD-MCNC: 244 MG/DL (ref 60–115)
GLUCOSE BLD-MCNC: 245 MG/DL (ref 60–115)
GLUCOSE BLD-MCNC: 249 MG/DL (ref 60–115)
GLUCOSE BLD-MCNC: 250 MG/DL (ref 60–115)
GLUCOSE BLD-MCNC: 269 MG/DL (ref 60–115)
GLUCOSE BLD-MCNC: 276 MG/DL (ref 60–115)
GLUCOSE BLD-MCNC: 276 MG/DL (ref 60–115)
GLUCOSE BLD-MCNC: 285 MG/DL (ref 60–115)
GLUCOSE BLD-MCNC: 298 MG/DL (ref 60–115)
GLUCOSE BLD-MCNC: 300 MG/DL (ref 60–115)
GLUCOSE BLD-MCNC: 303 MG/DL (ref 60–115)
GLUCOSE BLD-MCNC: 307 MG/DL (ref 60–115)
GLUCOSE BLD-MCNC: 310 MG/DL (ref 60–115)
GLUCOSE BLD-MCNC: 319 MG/DL (ref 60–115)
GLUCOSE BLD-MCNC: 323 MG/DL (ref 60–115)
GLUCOSE BLD-MCNC: 326 MG/DL (ref 60–115)
GLUCOSE BLD-MCNC: 326 MG/DL (ref 60–115)
GLUCOSE BLD-MCNC: 340 MG/DL (ref 60–115)
GLUCOSE BLD-MCNC: 348 MG/DL (ref 60–115)
GLUCOSE BLD-MCNC: 357 MG/DL (ref 60–115)
GLUCOSE BLD-MCNC: 374 MG/DL (ref 60–115)
GLUCOSE BLD-MCNC: 406 MG/DL (ref 60–115)
GLUCOSE BLD-MCNC: 423 MG/DL (ref 60–115)
GLUCOSE BLD-MCNC: 445 MG/DL (ref 70–99)
GLUCOSE BLD-MCNC: 451 MG/DL (ref 60–115)
GLUCOSE BLD-MCNC: 67 MG/DL (ref 70–99)
GLUCOSE URINE: 250 MG/DL
HBA1C MFR BLD: 7.3 % (ref 4.8–5.9)
HCO3 ARTERIAL: 26 MMOL/L (ref 21–29)
HCO3 ARTERIAL: 28.6 MMOL/L (ref 21–29)
HCO3 ARTERIAL: 29.3 MMOL/L (ref 21–29)
HCO3 ARTERIAL: 29.5 MMOL/L (ref 21–29)
HCO3 ARTERIAL: 30.4 MMOL/L (ref 21–29)
HCO3 ARTERIAL: 30.6 MMOL/L (ref 21–29)
HCO3 ARTERIAL: 30.9 MMOL/L (ref 21–29)
HCO3 ARTERIAL: 32.7 MMOL/L (ref 21–29)
HCO3 ARTERIAL: 33.4 MMOL/L (ref 21–29)
HCT VFR BLD CALC: 20.7 % (ref 42–52)
HCT VFR BLD CALC: 29.8 % (ref 42–52)
HCT VFR BLD CALC: 29.8 % (ref 42–52)
HCT VFR BLD CALC: 29.9 % (ref 42–52)
HCT VFR BLD CALC: 30.1 % (ref 42–52)
HCT VFR BLD CALC: 30.2 % (ref 42–52)
HCT VFR BLD CALC: 30.5 % (ref 42–52)
HDLC SERPL-MCNC: 32 MG/DL (ref 40–59)
HEMOGLOBIN: 10 GM/DL (ref 13.5–17.5)
HEMOGLOBIN: 10 GM/DL (ref 13.5–17.5)
HEMOGLOBIN: 10.1 GM/DL (ref 13.5–17.5)
HEMOGLOBIN: 10.3 GM/DL (ref 13.5–17.5)
HEMOGLOBIN: 10.4 GM/DL (ref 13.5–17.5)
HEMOGLOBIN: 10.6 GM/DL (ref 13.5–17.5)
HEMOGLOBIN: 4 GM/DL (ref 13.5–17.5)
HEMOGLOBIN: 6 G/DL (ref 13.7–17.5)
HEMOGLOBIN: 8.9 G/DL (ref 13.7–17.5)
HEMOGLOBIN: 9.3 G/DL (ref 14–18)
HEMOGLOBIN: 9.5 GM/DL (ref 13.5–17.5)
HEMOGLOBIN: 9.6 G/DL (ref 14–18)
HEMOGLOBIN: 9.7 G/DL (ref 14–18)
HEMOGLOBIN: 9.8 GM/DL (ref 13.5–17.5)
HYALINE CASTS: ABNORMAL /HPF (ref 0–5)
IMMATURE GRANULOCYTES #: 0.1 K/UL
IMMATURE GRANULOCYTES %: 0.7 %
INR BLD: 1.5
IRON SATURATION: 11 % (ref 11–46)
IRON SATURATION: 13 % (ref 11–46)
IRON: 21 UG/DL (ref 59–158)
IRON: 22 UG/DL (ref 59–158)
KETONES, URINE: NEGATIVE MG/DL
LACTATE: 0.71 MMOL/L (ref 0.4–2)
LACTATE: 0.74 MMOL/L (ref 0.4–2)
LACTATE: 0.89 MMOL/L (ref 0.4–2)
LACTATE: 0.9 MMOL/L (ref 0.4–2)
LACTATE: 0.93 MMOL/L (ref 0.4–2)
LACTATE: 0.98 MMOL/L (ref 0.4–2)
LACTATE: 1.06 MMOL/L (ref 0.4–2)
LACTATE: 1.11 MMOL/L (ref 0.4–2)
LACTATE: 6.03 MMOL/L (ref 0.4–2)
LACTIC ACID: 1.4 MMOL/L (ref 0.5–2.2)
LDL CHOLESTEROL CALCULATED: 38 MG/DL (ref 0–129)
LEUKOCYTE ESTERASE, URINE: ABNORMAL
LYMPHOCYTES ABSOLUTE: 0.5 K/UL (ref 1–4.8)
LYMPHOCYTES ABSOLUTE: 0.8 K/UL (ref 1–4.8)
LYMPHOCYTES ABSOLUTE: 0.9 K/UL (ref 1–4.8)
LYMPHOCYTES ABSOLUTE: 1 K/UL (ref 1–4.8)
LYMPHOCYTES ABSOLUTE: 1.4 K/UL (ref 1–4.8)
LYMPHOCYTES ABSOLUTE: 1.8 K/UL (ref 1.3–3.6)
LYMPHOCYTES RELATIVE PERCENT: 12.3 %
LYMPHOCYTES RELATIVE PERCENT: 14 %
LYMPHOCYTES RELATIVE PERCENT: 14.3 %
LYMPHOCYTES RELATIVE PERCENT: 16.5 %
LYMPHOCYTES RELATIVE PERCENT: 6.6 %
LYMPHOCYTES RELATIVE PERCENT: 7.2 %
MAGNESIUM: 2.5 MG/DL (ref 1.7–2.4)
MCH RBC QN AUTO: 27.8 PG (ref 25.7–32.2)
MCH RBC QN AUTO: 28 PG (ref 25.7–32.2)
MCH RBC QN AUTO: 28.6 PG (ref 27–31.3)
MCH RBC QN AUTO: 28.8 PG (ref 27–31.3)
MCH RBC QN AUTO: 29 PG (ref 27–31.3)
MCH RBC QN AUTO: 29.3 PG (ref 27–31.3)
MCH RBC QN AUTO: 29.4 PG (ref 27–31.3)
MCHC RBC AUTO-ENTMCNC: 29 % (ref 32.3–36.5)
MCHC RBC AUTO-ENTMCNC: 29.9 % (ref 32.3–36.5)
MCHC RBC AUTO-ENTMCNC: 31.1 % (ref 33–37)
MCHC RBC AUTO-ENTMCNC: 31.6 % (ref 33–37)
MCHC RBC AUTO-ENTMCNC: 31.9 % (ref 33–37)
MCHC RBC AUTO-ENTMCNC: 32 % (ref 33–37)
MCHC RBC AUTO-ENTMCNC: 32.2 % (ref 33–37)
MCV RBC AUTO: 90.7 FL (ref 80–100)
MCV RBC AUTO: 90.8 FL (ref 80–100)
MCV RBC AUTO: 91 FL (ref 80–100)
MCV RBC AUTO: 91.9 FL (ref 80–100)
MCV RBC AUTO: 92.6 FL (ref 80–100)
MCV RBC AUTO: 93.7 FL (ref 79–92.2)
MCV RBC AUTO: 95.8 FL (ref 79–92.2)
MONOCYTES ABSOLUTE: 0.7 K/UL (ref 0.2–0.8)
MONOCYTES ABSOLUTE: 0.8 K/UL (ref 0.2–0.8)
MONOCYTES ABSOLUTE: 0.9 K/UL (ref 0.2–0.8)
MONOCYTES ABSOLUTE: 1.1 K/UL (ref 0.2–0.8)
MONOCYTES ABSOLUTE: 1.1 K/UL (ref 0.2–0.8)
MONOCYTES ABSOLUTE: 1.6 K/UL (ref 0.3–0.8)
MONOCYTES RELATIVE PERCENT: 10.1 %
MONOCYTES RELATIVE PERCENT: 10.2 %
MONOCYTES RELATIVE PERCENT: 12.4 % (ref 5.3–12.2)
MONOCYTES RELATIVE PERCENT: 13 %
MONOCYTES RELATIVE PERCENT: 13.1 %
MONOCYTES RELATIVE PERCENT: 9.7 %
NEUTROPHILS ABSOLUTE: 5 K/UL (ref 1.4–6.5)
NEUTROPHILS ABSOLUTE: 5.5 K/UL (ref 1.4–6.5)
NEUTROPHILS ABSOLUTE: 5.7 K/UL (ref 1.4–6.5)
NEUTROPHILS ABSOLUTE: 6.5 K/UL (ref 1.4–6.5)
NEUTROPHILS ABSOLUTE: 8.8 K/UL (ref 1.8–5.4)
NEUTROPHILS ABSOLUTE: 9 K/UL (ref 1.4–6.5)
NEUTROPHILS RELATIVE PERCENT: 67.1 %
NEUTROPHILS RELATIVE PERCENT: 69.3 %
NEUTROPHILS RELATIVE PERCENT: 70 % (ref 34–67.9)
NEUTROPHILS RELATIVE PERCENT: 76.1 %
NEUTROPHILS RELATIVE PERCENT: 82 %
NEUTROPHILS RELATIVE PERCENT: 82.5 %
NITRITE, URINE: NEGATIVE
O2 SAT, ARTERIAL: 95 % (ref 93–100)
O2 SAT, ARTERIAL: 96 % (ref 93–100)
O2 SAT, ARTERIAL: 98 % (ref 93–100)
O2 SAT, ARTERIAL: 99 % (ref 93–100)
O2 SAT, ARTERIAL: 99 % (ref 93–100)
PCO2 ARTERIAL: 46 MM HG (ref 35–45)
PCO2 ARTERIAL: 62 MM HG (ref 35–45)
PCO2 ARTERIAL: 63 MM HG (ref 35–45)
PCO2 ARTERIAL: 64 MM HG (ref 35–45)
PCO2 ARTERIAL: 66 MM HG (ref 35–45)
PCO2 ARTERIAL: 71 MM HG (ref 35–45)
PCO2 ARTERIAL: 71 MM HG (ref 35–45)
PCO2 ARTERIAL: 72 MM HG (ref 35–45)
PCO2 ARTERIAL: 77 MM HG (ref 35–45)
PDW BLD-RTO: 14 % (ref 11.6–14.4)
PDW BLD-RTO: 14.3 % (ref 11.6–14.4)
PDW BLD-RTO: 15.1 % (ref 11.5–14.5)
PDW BLD-RTO: 15.5 % (ref 11.5–14.5)
PDW BLD-RTO: 15.6 % (ref 11.5–14.5)
PDW BLD-RTO: 16 % (ref 11.5–14.5)
PDW BLD-RTO: 16.2 % (ref 11.5–14.5)
PERFORMED ON: ABNORMAL
PH ARTERIAL: 7.19 (ref 7.35–7.45)
PH ARTERIAL: 7.22 (ref 7.35–7.45)
PH ARTERIAL: 7.22 (ref 7.35–7.45)
PH ARTERIAL: 7.27 (ref 7.35–7.45)
PH ARTERIAL: 7.27 (ref 7.35–7.45)
PH ARTERIAL: 7.29 (ref 7.35–7.45)
PH ARTERIAL: 7.31 (ref 7.35–7.45)
PH ARTERIAL: 7.32 (ref 7.35–7.45)
PH ARTERIAL: 7.36 (ref 7.35–7.45)
PH UA: 5 (ref 5–9)
PLATELET # BLD: 107 K/UL (ref 163–337)
PLATELET # BLD: 143 K/UL (ref 130–400)
PLATELET # BLD: 155 K/UL (ref 130–400)
PLATELET # BLD: 173 K/UL (ref 130–400)
PLATELET # BLD: 186 K/UL (ref 130–400)
PLATELET # BLD: 208 K/UL (ref 130–400)
PLATELET # BLD: 238 K/UL (ref 163–337)
PLATELET SLIDE REVIEW: ABNORMAL
PO2 ARTERIAL: 117 MM HG (ref 75–108)
PO2 ARTERIAL: 123 MM HG (ref 75–108)
PO2 ARTERIAL: 124 MM HG (ref 75–108)
PO2 ARTERIAL: 124 MM HG (ref 75–108)
PO2 ARTERIAL: 131 MM HG (ref 75–108)
PO2 ARTERIAL: 132 MM HG (ref 75–108)
PO2 ARTERIAL: 162 MM HG (ref 75–108)
PO2 ARTERIAL: 96 MM HG (ref 75–108)
PO2 ARTERIAL: 98 MM HG (ref 75–108)
POC CHLORIDE: 106 MEQ/L (ref 99–110)
POC CHLORIDE: 107 MEQ/L (ref 99–110)
POC CHLORIDE: 108 MEQ/L (ref 99–110)
POC CHLORIDE: 108 MEQ/L (ref 99–110)
POC CHLORIDE: 109 MEQ/L (ref 99–110)
POC CHLORIDE: 96 MEQ/L (ref 99–110)
POC CREATININE: 2.4 MG/DL (ref 0.8–1.3)
POC CREATININE: 2.7 MG/DL (ref 0.8–1.3)
POC CREATININE: 2.7 MG/DL (ref 0.8–1.3)
POC CREATININE: 2.8 MG/DL (ref 0.8–1.3)
POC CREATININE: 2.8 MG/DL (ref 0.8–1.3)
POC CREATININE: 2.9 MG/DL (ref 0.8–1.3)
POC CREATININE: 3.1 MG/DL (ref 0.8–1.3)
POC FIO2: 3
POC FIO2: 3
POC FIO2: 4
POC FIO2: 40
POC HEMATOCRIT: 12 % (ref 41–53)
POC HEMATOCRIT: 28 % (ref 41–53)
POC HEMATOCRIT: 29 % (ref 41–53)
POC HEMATOCRIT: 30 % (ref 41–53)
POC HEMATOCRIT: 30 % (ref 41–53)
POC HEMATOCRIT: 31 % (ref 41–53)
POC HEMATOCRIT: 31 % (ref 41–53)
POC POTASSIUM: 5.6 MEQ/L (ref 3.5–5.1)
POC POTASSIUM: 5.7 MEQ/L (ref 3.5–5.1)
POC POTASSIUM: 5.8 MEQ/L (ref 3.5–5.1)
POC POTASSIUM: 5.9 MEQ/L (ref 3.5–5.1)
POC POTASSIUM: 5.9 MEQ/L (ref 3.5–5.1)
POC POTASSIUM: 6.6 MEQ/L (ref 3.5–5.1)
POC POTASSIUM: 6.6 MEQ/L (ref 3.5–5.1)
POC POTASSIUM: 6.7 MEQ/L (ref 3.5–5.1)
POC POTASSIUM: 6.9 MEQ/L (ref 3.5–5.1)
POC SAMPLE TYPE: ABNORMAL
POC SODIUM: 133 MEQ/L (ref 136–145)
POC SODIUM: 139 MEQ/L (ref 136–145)
POC SODIUM: 140 MEQ/L (ref 136–145)
POC SODIUM: 141 MEQ/L (ref 136–145)
POC SODIUM: 142 MEQ/L (ref 136–145)
POC SODIUM: 143 MEQ/L (ref 136–145)
POC SODIUM: 145 MEQ/L (ref 136–145)
POTASSIUM REFLEX MAGNESIUM: 4.1 MEQ/L (ref 3.4–4.9)
POTASSIUM REFLEX MAGNESIUM: 4.3 MEQ/L (ref 3.4–4.9)
POTASSIUM REFLEX MAGNESIUM: 4.4 MEQ/L (ref 3.4–4.9)
POTASSIUM REFLEX MAGNESIUM: 4.5 MEQ/L (ref 3.4–4.9)
POTASSIUM REFLEX MAGNESIUM: 6.1 MEQ/L (ref 3.4–4.9)
POTASSIUM SERPL-SCNC: 4.7 MEQ/L (ref 3.4–4.9)
POTASSIUM SERPL-SCNC: 5.2 MEQ/L (ref 3.4–4.9)
POTASSIUM SERPL-SCNC: 5.3 MEQ/L (ref 3.4–4.9)
POTASSIUM SERPL-SCNC: 5.9 MEQ/L (ref 3.4–4.9)
POTASSIUM SERPL-SCNC: 6 MEQ/L (ref 3.4–4.9)
POTASSIUM SERPL-SCNC: 6.1 MEQ/L (ref 3.4–4.9)
POTASSIUM SERPL-SCNC: 6.4 MEQ/L (ref 3.4–4.9)
POTASSIUM SERPL-SCNC: 6.5 MEQ/L (ref 3.4–4.9)
POTASSIUM SERPL-SCNC: 6.8 MEQ/L (ref 3.4–4.9)
PROCALCITONIN: 0.23 NG/ML (ref 0–0.15)
PROCALCITONIN: 0.29 NG/ML (ref 0–0.15)
PROCALCITONIN: 0.33 NG/ML (ref 0–0.15)
PROTEIN UA: 100 MG/DL
PROTHROMBIN TIME: 17.6 SEC (ref 12.3–14.9)
RBC # BLD: 2.16 M/UL (ref 4.63–6.08)
RBC # BLD: 3.18 M/UL (ref 4.63–6.08)
RBC # BLD: 3.23 M/UL (ref 4.7–6.1)
RBC # BLD: 3.27 M/UL (ref 4.7–6.1)
RBC # BLD: 3.29 M/UL (ref 4.7–6.1)
RBC # BLD: 3.32 M/UL (ref 4.7–6.1)
RBC # BLD: 3.36 M/UL (ref 4.7–6.1)
RBC UA: >100 /HPF (ref 0–5)
SARS-COV-2, NAAT: NOT DETECTED
SODIUM BLD-SCNC: 135 MEQ/L (ref 135–144)
SODIUM BLD-SCNC: 139 MEQ/L (ref 135–144)
SODIUM BLD-SCNC: 139 MEQ/L (ref 135–144)
SODIUM BLD-SCNC: 140 MEQ/L (ref 135–144)
SODIUM BLD-SCNC: 142 MEQ/L (ref 135–144)
SODIUM BLD-SCNC: 142 MEQ/L (ref 135–144)
SODIUM BLD-SCNC: 143 MEQ/L (ref 135–144)
SODIUM BLD-SCNC: 144 MEQ/L (ref 135–144)
SODIUM BLD-SCNC: 145 MEQ/L (ref 135–144)
SODIUM BLD-SCNC: 145 MEQ/L (ref 135–144)
SODIUM BLD-SCNC: 146 MEQ/L (ref 135–144)
SODIUM BLD-SCNC: 148 MEQ/L (ref 135–144)
SODIUM URINE: 50 MEQ/L
SPECIFIC GRAVITY UA: 1.01 (ref 1–1.03)
TCO2 ARTERIAL: 27 (ref 22–29)
TCO2 ARTERIAL: 31 (ref 22–29)
TCO2 ARTERIAL: 32 (ref 22–29)
TCO2 ARTERIAL: 33 (ref 22–29)
TCO2 ARTERIAL: 33 (ref 22–29)
TCO2 ARTERIAL: 35 (ref 22–29)
TCO2 ARTERIAL: 36 (ref 22–29)
TOTAL IRON BINDING CAPACITY: 168 UG/DL (ref 178–450)
TOTAL IRON BINDING CAPACITY: 191 UG/DL (ref 178–450)
TOTAL PROTEIN: 5.5 G/DL (ref 6.3–8)
TOTAL PROTEIN: 5.6 G/DL (ref 6.3–8)
TOTAL PROTEIN: 5.7 G/DL (ref 6.3–8)
TOTAL PROTEIN: 5.7 G/DL (ref 6.3–8)
TOTAL PROTEIN: 6.3 G/DL (ref 6.3–8)
TRIGLYCERIDE, FASTING: 81 MG/DL (ref 0–150)
TROPONIN: 0.03 NG/ML (ref 0–0.01)
TROPONIN: 0.15 NG/ML (ref 0–0.01)
TSH REFLEX: 1.13 UIU/ML (ref 0.44–3.86)
URINE CULTURE, ROUTINE: NORMAL
URINE REFLEX TO CULTURE: ABNORMAL
UROBILINOGEN, URINE: 0.2 E.U./DL
VITAMIN B-12: 1605 PG/ML (ref 232–1245)
VITAMIN B-12: >2000 PG/ML (ref 232–1245)
WBC # BLD: 11 K/UL (ref 4.8–10.8)
WBC # BLD: 12.6 K/UL (ref 4.2–9)
WBC # BLD: 6.9 K/UL (ref 4.2–9)
WBC # BLD: 7.2 K/UL (ref 4.8–10.8)
WBC # BLD: 7.3 K/UL (ref 4.8–10.8)
WBC # BLD: 7.8 K/UL (ref 4.8–10.8)
WBC # BLD: 8.5 K/UL (ref 4.8–10.8)
WBC UA: ABNORMAL /HPF (ref 0–5)

## 2021-01-01 PROCEDURE — 97162 PT EVAL MOD COMPLEX 30 MIN: CPT

## 2021-01-01 PROCEDURE — 6370000000 HC RX 637 (ALT 250 FOR IP): Performed by: NURSE PRACTITIONER

## 2021-01-01 PROCEDURE — 36415 COLL VENOUS BLD VENIPUNCTURE: CPT

## 2021-01-01 PROCEDURE — 97110 THERAPEUTIC EXERCISES: CPT

## 2021-01-01 PROCEDURE — 4040F PNEUMOC VAC/ADMIN/RCVD: CPT | Performed by: PHYSICIAN ASSISTANT

## 2021-01-01 PROCEDURE — 82565 ASSAY OF CREATININE: CPT

## 2021-01-01 PROCEDURE — C9113 INJ PANTOPRAZOLE SODIUM, VIA: HCPCS | Performed by: INTERNAL MEDICINE

## 2021-01-01 PROCEDURE — 82948 REAGENT STRIP/BLOOD GLUCOSE: CPT

## 2021-01-01 PROCEDURE — 6360000002 HC RX W HCPCS: Performed by: INTERNAL MEDICINE

## 2021-01-01 PROCEDURE — 6360000002 HC RX W HCPCS: Performed by: NURSE PRACTITIONER

## 2021-01-01 PROCEDURE — 82803 BLOOD GASES ANY COMBINATION: CPT

## 2021-01-01 PROCEDURE — 86900 BLOOD TYPING SEROLOGIC ABO: CPT

## 2021-01-01 PROCEDURE — 97535 SELF CARE MNGMENT TRAINING: CPT

## 2021-01-01 PROCEDURE — 83540 ASSAY OF IRON: CPT

## 2021-01-01 PROCEDURE — 83036 HEMOGLOBIN GLYCOSYLATED A1C: CPT

## 2021-01-01 PROCEDURE — 97530 THERAPEUTIC ACTIVITIES: CPT

## 2021-01-01 PROCEDURE — 2580000003 HC RX 258: Performed by: INTERNAL MEDICINE

## 2021-01-01 PROCEDURE — 85014 HEMATOCRIT: CPT

## 2021-01-01 PROCEDURE — 6370000000 HC RX 637 (ALT 250 FOR IP): Performed by: INTERNAL MEDICINE

## 2021-01-01 PROCEDURE — 2700000000 HC OXYGEN THERAPY PER DAY

## 2021-01-01 PROCEDURE — 2580000003 HC RX 258: Performed by: NURSE PRACTITIONER

## 2021-01-01 PROCEDURE — 93971 EXTREMITY STUDY: CPT

## 2021-01-01 PROCEDURE — 93010 ELECTROCARDIOGRAM REPORT: CPT | Performed by: INTERNAL MEDICINE

## 2021-01-01 PROCEDURE — G8427 DOCREV CUR MEDS BY ELIG CLIN: HCPCS | Performed by: PHYSICIAN ASSISTANT

## 2021-01-01 PROCEDURE — 2500000003 HC RX 250 WO HCPCS: Performed by: INTERNAL MEDICINE

## 2021-01-01 PROCEDURE — 70544 MR ANGIOGRAPHY HEAD W/O DYE: CPT

## 2021-01-01 PROCEDURE — 84145 PROCALCITONIN (PCT): CPT

## 2021-01-01 PROCEDURE — 83735 ASSAY OF MAGNESIUM: CPT

## 2021-01-01 PROCEDURE — 81001 URINALYSIS AUTO W/SCOPE: CPT

## 2021-01-01 PROCEDURE — 82607 VITAMIN B-12: CPT

## 2021-01-01 PROCEDURE — 36600 WITHDRAWAL OF ARTERIAL BLOOD: CPT

## 2021-01-01 PROCEDURE — 83605 ASSAY OF LACTIC ACID: CPT

## 2021-01-01 PROCEDURE — 93005 ELECTROCARDIOGRAM TRACING: CPT | Performed by: INTERNAL MEDICINE

## 2021-01-01 PROCEDURE — 6360000002 HC RX W HCPCS: Performed by: EMERGENCY MEDICINE

## 2021-01-01 PROCEDURE — G8417 CALC BMI ABV UP PARAM F/U: HCPCS | Performed by: PHYSICIAN ASSISTANT

## 2021-01-01 PROCEDURE — 87086 URINE CULTURE/COLONY COUNT: CPT

## 2021-01-01 PROCEDURE — 85025 COMPLETE CBC W/AUTO DIFF WBC: CPT

## 2021-01-01 PROCEDURE — 2500000003 HC RX 250 WO HCPCS: Performed by: NURSE PRACTITIONER

## 2021-01-01 PROCEDURE — 85027 COMPLETE CBC AUTOMATED: CPT

## 2021-01-01 PROCEDURE — 94660 CPAP INITIATION&MGMT: CPT

## 2021-01-01 PROCEDURE — 73552 X-RAY EXAM OF FEMUR 2/>: CPT

## 2021-01-01 PROCEDURE — 1123F ACP DISCUSS/DSCN MKR DOCD: CPT | Performed by: PHYSICIAN ASSISTANT

## 2021-01-01 PROCEDURE — 80048 BASIC METABOLIC PNL TOTAL CA: CPT

## 2021-01-01 PROCEDURE — 6360000002 HC RX W HCPCS

## 2021-01-01 PROCEDURE — 2000000000 HC ICU R&B

## 2021-01-01 PROCEDURE — 96374 THER/PROPH/DIAG INJ IV PUSH: CPT

## 2021-01-01 PROCEDURE — 82570 ASSAY OF URINE CREATININE: CPT

## 2021-01-01 PROCEDURE — 84132 ASSAY OF SERUM POTASSIUM: CPT

## 2021-01-01 PROCEDURE — 86850 RBC ANTIBODY SCREEN: CPT

## 2021-01-01 PROCEDURE — 82330 ASSAY OF CALCIUM: CPT

## 2021-01-01 PROCEDURE — 82435 ASSAY OF BLOOD CHLORIDE: CPT

## 2021-01-01 PROCEDURE — 97166 OT EVAL MOD COMPLEX 45 MIN: CPT

## 2021-01-01 PROCEDURE — 97116 GAIT TRAINING THERAPY: CPT

## 2021-01-01 PROCEDURE — 2580000003 HC RX 258: Performed by: FAMILY MEDICINE

## 2021-01-01 PROCEDURE — 51702 INSERT TEMP BLADDER CATH: CPT

## 2021-01-01 PROCEDURE — 2500000003 HC RX 250 WO HCPCS: Performed by: PHYSICIAN ASSISTANT

## 2021-01-01 PROCEDURE — 76775 US EXAM ABDO BACK WALL LIM: CPT

## 2021-01-01 PROCEDURE — 99221 1ST HOSP IP/OBS SF/LOW 40: CPT | Performed by: PSYCHIATRY & NEUROLOGY

## 2021-01-01 PROCEDURE — 71046 X-RAY EXAM CHEST 2 VIEWS: CPT

## 2021-01-01 PROCEDURE — 87040 BLOOD CULTURE FOR BACTERIA: CPT

## 2021-01-01 PROCEDURE — 93925 LOWER EXTREMITY STUDY: CPT

## 2021-01-01 PROCEDURE — 99233 SBSQ HOSP IP/OBS HIGH 50: CPT | Performed by: INTERNAL MEDICINE

## 2021-01-01 PROCEDURE — 2060000000 HC ICU INTERMEDIATE R&B

## 2021-01-01 PROCEDURE — 2580000003 HC RX 258: Performed by: PHYSICIAN ASSISTANT

## 2021-01-01 PROCEDURE — 85610 PROTHROMBIN TIME: CPT

## 2021-01-01 PROCEDURE — 80053 COMPREHEN METABOLIC PANEL: CPT

## 2021-01-01 PROCEDURE — 70450 CT HEAD/BRAIN W/O DYE: CPT

## 2021-01-01 PROCEDURE — 71045 X-RAY EXAM CHEST 1 VIEW: CPT

## 2021-01-01 PROCEDURE — 6370000000 HC RX 637 (ALT 250 FOR IP): Performed by: PSYCHIATRY & NEUROLOGY

## 2021-01-01 PROCEDURE — 6370000000 HC RX 637 (ALT 250 FOR IP): Performed by: PHYSICIAN ASSISTANT

## 2021-01-01 PROCEDURE — P9016 RBC LEUKOCYTES REDUCED: HCPCS

## 2021-01-01 PROCEDURE — 84443 ASSAY THYROID STIM HORMONE: CPT

## 2021-01-01 PROCEDURE — 83550 IRON BINDING TEST: CPT

## 2021-01-01 PROCEDURE — 2580000003 HC RX 258: Performed by: EMERGENCY MEDICINE

## 2021-01-01 PROCEDURE — 80061 LIPID PANEL: CPT

## 2021-01-01 PROCEDURE — 84295 ASSAY OF SERUM SODIUM: CPT

## 2021-01-01 PROCEDURE — 87635 SARS-COV-2 COVID-19 AMP PRB: CPT

## 2021-01-01 PROCEDURE — 84300 ASSAY OF URINE SODIUM: CPT

## 2021-01-01 PROCEDURE — 86923 COMPATIBILITY TEST ELECTRIC: CPT

## 2021-01-01 PROCEDURE — 70547 MR ANGIOGRAPHY NECK W/O DYE: CPT

## 2021-01-01 PROCEDURE — 1036F TOBACCO NON-USER: CPT | Performed by: PHYSICIAN ASSISTANT

## 2021-01-01 PROCEDURE — 1200000002 HC SEMI PRIVATE SWING BED

## 2021-01-01 PROCEDURE — 94761 N-INVAS EAR/PLS OXIMETRY MLT: CPT

## 2021-01-01 PROCEDURE — 36430 TRANSFUSION BLD/BLD COMPNT: CPT

## 2021-01-01 PROCEDURE — 82728 ASSAY OF FERRITIN: CPT

## 2021-01-01 PROCEDURE — 99214 OFFICE O/P EST MOD 30 MIN: CPT | Performed by: PHYSICIAN ASSISTANT

## 2021-01-01 PROCEDURE — 99291 CRITICAL CARE FIRST HOUR: CPT | Performed by: INTERNAL MEDICINE

## 2021-01-01 PROCEDURE — 1210000000 HC MED SURG R&B

## 2021-01-01 PROCEDURE — 82274 ASSAY TEST FOR BLOOD FECAL: CPT

## 2021-01-01 PROCEDURE — 99284 EMERGENCY DEPT VISIT MOD MDM: CPT

## 2021-01-01 PROCEDURE — 82746 ASSAY OF FOLIC ACID SERUM: CPT

## 2021-01-01 PROCEDURE — 93005 ELECTROCARDIOGRAM TRACING: CPT | Performed by: PHYSICIAN ASSISTANT

## 2021-01-01 PROCEDURE — 99233 SBSQ HOSP IP/OBS HIGH 50: CPT | Performed by: PSYCHIATRY & NEUROLOGY

## 2021-01-01 PROCEDURE — 99232 SBSQ HOSP IP/OBS MODERATE 35: CPT | Performed by: INTERNAL MEDICINE

## 2021-01-01 PROCEDURE — 93880 EXTRACRANIAL BILAT STUDY: CPT

## 2021-01-01 PROCEDURE — 6370000000 HC RX 637 (ALT 250 FOR IP): Performed by: STUDENT IN AN ORGANIZED HEALTH CARE EDUCATION/TRAINING PROGRAM

## 2021-01-01 PROCEDURE — 83036 HEMOGLOBIN GLYCOSYLATED A1C: CPT | Performed by: PHYSICIAN ASSISTANT

## 2021-01-01 PROCEDURE — APPSS30 APP SPLIT SHARED TIME 16-30 MINUTES: Performed by: NURSE PRACTITIONER

## 2021-01-01 PROCEDURE — 6360000002 HC RX W HCPCS: Performed by: PHYSICIAN ASSISTANT

## 2021-01-01 PROCEDURE — 85730 THROMBOPLASTIN TIME PARTIAL: CPT

## 2021-01-01 PROCEDURE — 93005 ELECTROCARDIOGRAM TRACING: CPT

## 2021-01-01 PROCEDURE — 84484 ASSAY OF TROPONIN QUANT: CPT

## 2021-01-01 PROCEDURE — 86901 BLOOD TYPING SEROLOGIC RH(D): CPT

## 2021-01-01 PROCEDURE — 96375 TX/PRO/DX INJ NEW DRUG ADDON: CPT

## 2021-01-01 PROCEDURE — 99213 OFFICE O/P EST LOW 20 MIN: CPT | Performed by: PHYSICIAN ASSISTANT

## 2021-01-01 RX ORDER — HYDRALAZINE HYDROCHLORIDE 100 MG/1
1 TABLET, FILM COATED ORAL 2 TIMES DAILY
COMMUNITY
Start: 2021-01-01

## 2021-01-01 RX ORDER — CIPROFLOXACIN 500 MG/1
500 TABLET, FILM COATED ORAL 2 TIMES DAILY
Qty: 20 TABLET | Refills: 0 | Status: SHIPPED | OUTPATIENT
Start: 2021-01-01 | End: 2021-01-01

## 2021-01-01 RX ORDER — AMMONIUM LACTATE 12 G/100G
LOTION TOPICAL 2 TIMES DAILY
Status: DISCONTINUED | OUTPATIENT
Start: 2021-01-01 | End: 2021-01-01

## 2021-01-01 RX ORDER — ACETAMINOPHEN 650 MG/1
650 SUPPOSITORY RECTAL EVERY 6 HOURS PRN
Status: DISCONTINUED | OUTPATIENT
Start: 2021-01-01 | End: 2021-01-01 | Stop reason: SDUPTHER

## 2021-01-01 RX ORDER — DEXTROSE MONOHYDRATE 50 MG/ML
100 INJECTION, SOLUTION INTRAVENOUS PRN
Status: DISCONTINUED | OUTPATIENT
Start: 2021-01-01 | End: 2021-01-01

## 2021-01-01 RX ORDER — ONDANSETRON 4 MG/1
4 TABLET, ORALLY DISINTEGRATING ORAL EVERY 8 HOURS PRN
Status: DISCONTINUED | OUTPATIENT
Start: 2021-01-01 | End: 2021-01-01 | Stop reason: HOSPADM

## 2021-01-01 RX ORDER — PANTOPRAZOLE SODIUM 40 MG/10ML
40 INJECTION, POWDER, LYOPHILIZED, FOR SOLUTION INTRAVENOUS DAILY
Status: DISCONTINUED | OUTPATIENT
Start: 2021-08-30 | End: 2021-01-01 | Stop reason: SDUPTHER

## 2021-01-01 RX ORDER — SODIUM POLYSTYRENE SULFONATE 15 G/60ML
15 SUSPENSION ORAL; RECTAL ONCE
Status: COMPLETED | OUTPATIENT
Start: 2021-01-01 | End: 2021-01-01

## 2021-01-01 RX ORDER — POLYETHYLENE GLYCOL 3350 17 G/17G
17 POWDER, FOR SOLUTION ORAL DAILY
Status: DISCONTINUED | OUTPATIENT
Start: 2021-01-01 | End: 2021-01-01

## 2021-01-01 RX ORDER — PANTOPRAZOLE SODIUM 40 MG/10ML
40 INJECTION, POWDER, LYOPHILIZED, FOR SOLUTION INTRAVENOUS DAILY
Status: CANCELLED | OUTPATIENT
Start: 2021-01-01

## 2021-01-01 RX ORDER — ISOSORBIDE MONONITRATE 60 MG/1
60 TABLET, EXTENDED RELEASE ORAL DAILY
Status: DISCONTINUED | OUTPATIENT
Start: 2021-08-30 | End: 2021-08-30 | Stop reason: HOSPADM

## 2021-01-01 RX ORDER — CEPHALEXIN 500 MG/1
500 CAPSULE ORAL 4 TIMES DAILY
Qty: 20 CAPSULE | Refills: 0 | Status: SHIPPED | OUTPATIENT
Start: 2021-01-01 | End: 2021-01-01

## 2021-01-01 RX ORDER — DEXTROSE MONOHYDRATE 50 MG/ML
100 INJECTION, SOLUTION INTRAVENOUS PRN
Status: DISCONTINUED | OUTPATIENT
Start: 2021-01-01 | End: 2021-08-30 | Stop reason: HOSPADM

## 2021-01-01 RX ORDER — ATORVASTATIN CALCIUM 80 MG/1
80 TABLET, FILM COATED ORAL NIGHTLY
Status: DISCONTINUED | OUTPATIENT
Start: 2021-01-01 | End: 2021-08-30 | Stop reason: HOSPADM

## 2021-01-01 RX ORDER — TORSEMIDE 20 MG/1
40 TABLET ORAL DAILY
Status: CANCELLED | OUTPATIENT
Start: 2021-01-01

## 2021-01-01 RX ORDER — ISOSORBIDE MONONITRATE 30 MG/1
60 TABLET, EXTENDED RELEASE ORAL DAILY
Status: CANCELLED | OUTPATIENT
Start: 2021-08-30

## 2021-01-01 RX ORDER — FUROSEMIDE 10 MG/ML
40 INJECTION INTRAMUSCULAR; INTRAVENOUS ONCE
Status: COMPLETED | OUTPATIENT
Start: 2021-01-01 | End: 2021-01-01

## 2021-01-01 RX ORDER — ONDANSETRON 2 MG/ML
4 INJECTION INTRAMUSCULAR; INTRAVENOUS EVERY 6 HOURS PRN
Status: DISCONTINUED | OUTPATIENT
Start: 2021-01-01 | End: 2021-01-01 | Stop reason: HOSPADM

## 2021-01-01 RX ORDER — HYDRALAZINE HYDROCHLORIDE 25 MG/1
100 TABLET, FILM COATED ORAL 2 TIMES DAILY
Status: DISCONTINUED | OUTPATIENT
Start: 2021-01-01 | End: 2021-01-01

## 2021-01-01 RX ORDER — 0.9 % SODIUM CHLORIDE 0.9 %
500 INTRAVENOUS SOLUTION INTRAVENOUS ONCE
Status: DISCONTINUED | OUTPATIENT
Start: 2021-01-01 | End: 2021-01-01 | Stop reason: HOSPADM

## 2021-01-01 RX ORDER — FERROUS SULFATE 325(65) MG
325 TABLET ORAL 2 TIMES DAILY WITH MEALS
Status: CANCELLED | OUTPATIENT
Start: 2021-01-01

## 2021-01-01 RX ORDER — DOXAZOSIN 2 MG/1
2 TABLET ORAL DAILY
Status: CANCELLED | OUTPATIENT
Start: 2021-08-30

## 2021-01-01 RX ORDER — TORSEMIDE 20 MG/1
40 TABLET ORAL DAILY
Status: CANCELLED | OUTPATIENT
Start: 2021-08-30

## 2021-01-01 RX ORDER — CARVEDILOL 6.25 MG/1
6.25 TABLET ORAL 2 TIMES DAILY WITH MEALS
Status: DISCONTINUED | OUTPATIENT
Start: 2021-01-01 | End: 2021-01-01

## 2021-01-01 RX ORDER — INSULIN GLARGINE 100 [IU]/ML
40 INJECTION, SOLUTION SUBCUTANEOUS NIGHTLY
Status: DISCONTINUED | OUTPATIENT
Start: 2021-01-01 | End: 2021-01-01

## 2021-01-01 RX ORDER — SODIUM CHLORIDE 9 MG/ML
10 INJECTION INTRAVENOUS DAILY
Status: CANCELLED | OUTPATIENT
Start: 2021-01-01

## 2021-01-01 RX ORDER — INSULIN GLARGINE 100 [IU]/ML
20 INJECTION, SOLUTION SUBCUTANEOUS EVERY MORNING
Status: CANCELLED | OUTPATIENT
Start: 2021-08-30

## 2021-01-01 RX ORDER — POLYETHYLENE GLYCOL 3350 17 G/17G
17 POWDER, FOR SOLUTION ORAL DAILY PRN
Status: CANCELLED | OUTPATIENT
Start: 2021-01-01

## 2021-01-01 RX ORDER — HYDRALAZINE HYDROCHLORIDE 25 MG/1
100 TABLET, FILM COATED ORAL 2 TIMES DAILY
Status: CANCELLED | OUTPATIENT
Start: 2021-01-01

## 2021-01-01 RX ORDER — MORPHINE SULFATE 2 MG/ML
1 INJECTION, SOLUTION INTRAMUSCULAR; INTRAVENOUS EVERY 4 HOURS PRN
Status: DISCONTINUED | OUTPATIENT
Start: 2021-01-01 | End: 2021-08-30 | Stop reason: HOSPADM

## 2021-01-01 RX ORDER — ONDANSETRON 2 MG/ML
4 INJECTION INTRAMUSCULAR; INTRAVENOUS EVERY 6 HOURS PRN
Status: DISCONTINUED | OUTPATIENT
Start: 2021-01-01 | End: 2021-01-01 | Stop reason: SDUPTHER

## 2021-01-01 RX ORDER — TRAMADOL HYDROCHLORIDE 50 MG/1
50 TABLET ORAL EVERY 6 HOURS PRN
Status: DISCONTINUED | OUTPATIENT
Start: 2021-01-01 | End: 2021-08-30 | Stop reason: HOSPADM

## 2021-01-01 RX ORDER — FERROUS SULFATE 325(65) MG
325 TABLET ORAL 2 TIMES DAILY WITH MEALS
Status: DISCONTINUED | OUTPATIENT
Start: 2021-08-30 | End: 2021-08-30 | Stop reason: HOSPADM

## 2021-01-01 RX ORDER — DOXAZOSIN MESYLATE 1 MG/1
2 TABLET ORAL DAILY
Status: DISCONTINUED | OUTPATIENT
Start: 2021-01-01 | End: 2021-01-01 | Stop reason: HOSPADM

## 2021-01-01 RX ORDER — ASPIRIN 81 MG/1
81 TABLET, CHEWABLE ORAL DAILY
Status: DISCONTINUED | OUTPATIENT
Start: 2021-08-30 | End: 2021-08-30 | Stop reason: HOSPADM

## 2021-01-01 RX ORDER — DOXAZOSIN 2 MG/1
1 TABLET ORAL DAILY
COMMUNITY
Start: 2021-01-01

## 2021-01-01 RX ORDER — TORSEMIDE 20 MG/1
40 TABLET ORAL DAILY
Status: DISCONTINUED | OUTPATIENT
Start: 2021-08-30 | End: 2021-08-30 | Stop reason: HOSPADM

## 2021-01-01 RX ORDER — 0.9 % SODIUM CHLORIDE 0.9 %
1000 INTRAVENOUS SOLUTION INTRAVENOUS ONCE
Status: COMPLETED | OUTPATIENT
Start: 2021-01-01 | End: 2021-01-01

## 2021-01-01 RX ORDER — FINASTERIDE 5 MG/1
TABLET, FILM COATED ORAL
Qty: 90 TABLET | Refills: 3 | Status: SHIPPED | OUTPATIENT
Start: 2021-01-01

## 2021-01-01 RX ORDER — PANTOPRAZOLE SODIUM 40 MG/1
40 TABLET, DELAYED RELEASE ORAL
Status: DISCONTINUED | OUTPATIENT
Start: 2021-08-30 | End: 2021-01-01

## 2021-01-01 RX ORDER — CARVEDILOL 6.25 MG/1
6.25 TABLET ORAL 2 TIMES DAILY WITH MEALS
Status: DISCONTINUED | OUTPATIENT
Start: 2021-01-01 | End: 2021-01-01 | Stop reason: HOSPADM

## 2021-01-01 RX ORDER — SODIUM CHLORIDE 0.9 % (FLUSH) 0.9 %
5-40 SYRINGE (ML) INJECTION PRN
Status: DISCONTINUED | OUTPATIENT
Start: 2021-01-01 | End: 2021-08-30 | Stop reason: HOSPADM

## 2021-01-01 RX ORDER — TORSEMIDE 20 MG/1
40 TABLET ORAL DAILY
Status: DISCONTINUED | OUTPATIENT
Start: 2021-01-01 | End: 2021-01-01 | Stop reason: HOSPADM

## 2021-01-01 RX ORDER — SODIUM CHLORIDE 9 MG/ML
10 INJECTION INTRAVENOUS DAILY
Status: DISCONTINUED | OUTPATIENT
Start: 2021-08-30 | End: 2021-01-01 | Stop reason: SDUPTHER

## 2021-01-01 RX ORDER — SODIUM CHLORIDE 0.9 % (FLUSH) 0.9 %
5-40 SYRINGE (ML) INJECTION EVERY 12 HOURS SCHEDULED
Status: DISCONTINUED | OUTPATIENT
Start: 2021-01-01 | End: 2021-08-30 | Stop reason: HOSPADM

## 2021-01-01 RX ORDER — DOXAZOSIN MESYLATE 1 MG/1
2 TABLET ORAL DAILY
Status: DISCONTINUED | OUTPATIENT
Start: 2021-08-30 | End: 2021-08-30 | Stop reason: HOSPADM

## 2021-01-01 RX ORDER — INSULIN GLARGINE 100 [IU]/ML
20 INJECTION, SOLUTION SUBCUTANEOUS EVERY MORNING
Status: DISCONTINUED | OUTPATIENT
Start: 2021-01-01 | End: 2021-01-01

## 2021-01-01 RX ORDER — SODIUM CHLORIDE 9 MG/ML
INJECTION, SOLUTION INTRAVENOUS PRN
Status: DISCONTINUED | OUTPATIENT
Start: 2021-01-01 | End: 2021-08-30 | Stop reason: HOSPADM

## 2021-01-01 RX ORDER — FERROUS SULFATE 325(65) MG
TABLET ORAL
Qty: 180 TABLET | Refills: 1 | Status: SHIPPED | OUTPATIENT
Start: 2021-01-01

## 2021-01-01 RX ORDER — ACETAMINOPHEN 325 MG/1
650 TABLET ORAL EVERY 6 HOURS PRN
Status: DISCONTINUED | OUTPATIENT
Start: 2021-01-01 | End: 2021-08-30 | Stop reason: HOSPADM

## 2021-01-01 RX ORDER — DEXTROSE MONOHYDRATE 25 G/50ML
12.5 INJECTION, SOLUTION INTRAVENOUS PRN
Status: DISCONTINUED | OUTPATIENT
Start: 2021-01-01 | End: 2021-08-30 | Stop reason: HOSPADM

## 2021-01-01 RX ORDER — ONDANSETRON 4 MG/1
4 TABLET, ORALLY DISINTEGRATING ORAL EVERY 8 HOURS PRN
Status: DISCONTINUED | OUTPATIENT
Start: 2021-01-01 | End: 2021-01-01 | Stop reason: SDUPTHER

## 2021-01-01 RX ORDER — INSULIN GLARGINE 100 [IU]/ML
40 INJECTION, SOLUTION SUBCUTANEOUS NIGHTLY
Status: DISCONTINUED | OUTPATIENT
Start: 2021-01-01 | End: 2021-01-01 | Stop reason: HOSPADM

## 2021-01-01 RX ORDER — POLYETHYLENE GLYCOL 3350 17 G/17G
17 POWDER, FOR SOLUTION ORAL DAILY
Status: CANCELLED | OUTPATIENT
Start: 2021-08-30

## 2021-01-01 RX ORDER — ACETAMINOPHEN 325 MG/1
650 TABLET ORAL EVERY 6 HOURS PRN
Status: DISCONTINUED | OUTPATIENT
Start: 2021-01-01 | End: 2021-01-01 | Stop reason: SDUPTHER

## 2021-01-01 RX ORDER — SODIUM POLYSTYRENE SULFONATE 15 G/60ML
30 SUSPENSION ORAL; RECTAL ONCE
Status: DISCONTINUED | OUTPATIENT
Start: 2021-01-01 | End: 2021-01-01

## 2021-01-01 RX ORDER — AMMONIUM LACTATE 12 G/100G
LOTION TOPICAL 2 TIMES DAILY
Status: CANCELLED | OUTPATIENT
Start: 2021-01-01

## 2021-01-01 RX ORDER — METOPROLOL TARTRATE 5 MG/5ML
2.5 INJECTION INTRAVENOUS ONCE
Status: COMPLETED | OUTPATIENT
Start: 2021-01-01 | End: 2021-01-01

## 2021-01-01 RX ORDER — ASPIRIN 81 MG/1
81 TABLET ORAL ONCE
Status: COMPLETED | OUTPATIENT
Start: 2021-01-01 | End: 2021-01-01

## 2021-01-01 RX ORDER — DEXTROSE MONOHYDRATE 25 G/50ML
12.5 INJECTION, SOLUTION INTRAVENOUS PRN
Status: CANCELLED | OUTPATIENT
Start: 2021-01-01

## 2021-01-01 RX ORDER — SODIUM CHLORIDE 0.9 % (FLUSH) 0.9 %
5-40 SYRINGE (ML) INJECTION EVERY 12 HOURS SCHEDULED
Status: DISCONTINUED | OUTPATIENT
Start: 2021-01-01 | End: 2021-01-01 | Stop reason: HOSPADM

## 2021-01-01 RX ORDER — TRAMADOL HYDROCHLORIDE 50 MG/1
50 TABLET ORAL EVERY 6 HOURS PRN
Status: CANCELLED | OUTPATIENT
Start: 2021-01-01

## 2021-01-01 RX ORDER — ACETAMINOPHEN 650 MG/1
650 SUPPOSITORY RECTAL EVERY 6 HOURS PRN
Status: DISCONTINUED | OUTPATIENT
Start: 2021-01-01 | End: 2021-01-01

## 2021-01-01 RX ORDER — POLYETHYLENE GLYCOL 3350 17 G/17G
17 POWDER, FOR SOLUTION ORAL DAILY PRN
Status: DISCONTINUED | OUTPATIENT
Start: 2021-01-01 | End: 2021-08-30 | Stop reason: HOSPADM

## 2021-01-01 RX ORDER — AMMONIUM LACTATE 12 G/100G
LOTION TOPICAL 2 TIMES DAILY
Status: DISCONTINUED | OUTPATIENT
Start: 2021-01-01 | End: 2021-01-01 | Stop reason: HOSPADM

## 2021-01-01 RX ORDER — DEXTROSE MONOHYDRATE 50 MG/ML
100 INJECTION, SOLUTION INTRAVENOUS PRN
Status: DISCONTINUED | OUTPATIENT
Start: 2021-01-01 | End: 2021-01-01 | Stop reason: HOSPADM

## 2021-01-01 RX ORDER — SULFAMETHOXAZOLE AND TRIMETHOPRIM 800; 160 MG/1; MG/1
1 TABLET ORAL 2 TIMES DAILY
Qty: 20 TABLET | Refills: 0 | Status: SHIPPED | OUTPATIENT
Start: 2021-01-01 | End: 2021-01-01

## 2021-01-01 RX ORDER — PANTOPRAZOLE SODIUM 40 MG/10ML
40 INJECTION, POWDER, LYOPHILIZED, FOR SOLUTION INTRAVENOUS DAILY
Status: DISCONTINUED | OUTPATIENT
Start: 2021-01-01 | End: 2021-01-01

## 2021-01-01 RX ORDER — ONDANSETRON 4 MG/1
4 TABLET, ORALLY DISINTEGRATING ORAL EVERY 8 HOURS PRN
Status: CANCELLED | OUTPATIENT
Start: 2021-01-01

## 2021-01-01 RX ORDER — ONDANSETRON 2 MG/ML
4 INJECTION INTRAMUSCULAR; INTRAVENOUS EVERY 6 HOURS PRN
Status: DISCONTINUED | OUTPATIENT
Start: 2021-01-01 | End: 2021-01-01

## 2021-01-01 RX ORDER — FERROUS SULFATE 325(65) MG
325 TABLET ORAL 2 TIMES DAILY WITH MEALS
Status: DISCONTINUED | OUTPATIENT
Start: 2021-01-01 | End: 2021-01-01 | Stop reason: HOSPADM

## 2021-01-01 RX ORDER — INSULIN GLARGINE 100 [IU]/ML
50 INJECTION, SOLUTION SUBCUTANEOUS NIGHTLY
Status: DISCONTINUED | OUTPATIENT
Start: 2021-01-01 | End: 2021-01-01

## 2021-01-01 RX ORDER — ONDANSETRON 4 MG/1
4 TABLET, ORALLY DISINTEGRATING ORAL EVERY 8 HOURS PRN
Status: DISCONTINUED | OUTPATIENT
Start: 2021-01-01 | End: 2021-01-01

## 2021-01-01 RX ORDER — NICOTINE POLACRILEX 4 MG
15 LOZENGE BUCCAL PRN
Status: DISCONTINUED | OUTPATIENT
Start: 2021-01-01 | End: 2021-01-01

## 2021-01-01 RX ORDER — ISOSORBIDE MONONITRATE 30 MG/1
60 TABLET, EXTENDED RELEASE ORAL DAILY
Status: DISCONTINUED | OUTPATIENT
Start: 2021-01-01 | End: 2021-01-01

## 2021-01-01 RX ORDER — FUROSEMIDE 10 MG/ML
40 INJECTION INTRAMUSCULAR; INTRAVENOUS ONCE
Status: DISCONTINUED | OUTPATIENT
Start: 2021-01-01 | End: 2021-01-01

## 2021-01-01 RX ORDER — TRAMADOL HYDROCHLORIDE 50 MG/1
50 TABLET ORAL EVERY 6 HOURS PRN
Status: DISCONTINUED | OUTPATIENT
Start: 2021-01-01 | End: 2021-01-01

## 2021-01-01 RX ORDER — DOXAZOSIN MESYLATE 1 MG/1
2 TABLET ORAL DAILY
Status: CANCELLED | OUTPATIENT
Start: 2021-01-01

## 2021-01-01 RX ORDER — NICOTINE POLACRILEX 4 MG
15 LOZENGE BUCCAL PRN
Status: CANCELLED | OUTPATIENT
Start: 2021-01-01

## 2021-01-01 RX ORDER — DOXAZOSIN 2 MG/1
2 TABLET ORAL DAILY
Status: DISCONTINUED | OUTPATIENT
Start: 2021-01-01 | End: 2021-01-01

## 2021-01-01 RX ORDER — DEXTROSE MONOHYDRATE 25 G/50ML
25 INJECTION, SOLUTION INTRAVENOUS ONCE
Status: COMPLETED | OUTPATIENT
Start: 2021-01-01 | End: 2021-01-01

## 2021-01-01 RX ORDER — MORPHINE SULFATE 4 MG/ML
4 INJECTION, SOLUTION INTRAMUSCULAR; INTRAVENOUS ONCE
Status: COMPLETED | OUTPATIENT
Start: 2021-01-01 | End: 2021-01-01

## 2021-01-01 RX ORDER — DEXTROSE MONOHYDRATE 25 G/50ML
25 INJECTION, SOLUTION INTRAVENOUS PRN
Status: DISCONTINUED | OUTPATIENT
Start: 2021-01-01 | End: 2021-01-01 | Stop reason: HOSPADM

## 2021-01-01 RX ORDER — OXYCODONE HYDROCHLORIDE AND ACETAMINOPHEN 5; 325 MG/1; MG/1
1 TABLET ORAL EVERY 6 HOURS PRN
Status: DISCONTINUED | OUTPATIENT
Start: 2021-01-01 | End: 2021-01-01

## 2021-01-01 RX ORDER — LORAZEPAM 2 MG/ML
1 INJECTION INTRAMUSCULAR
Status: ACTIVE | OUTPATIENT
Start: 2021-01-01 | End: 2021-01-01

## 2021-01-01 RX ORDER — SODIUM CHLORIDE 9 MG/ML
10 INJECTION INTRAVENOUS DAILY
Status: DISCONTINUED | OUTPATIENT
Start: 2021-01-01 | End: 2021-01-01

## 2021-01-01 RX ORDER — ACETAMINOPHEN 650 MG/1
650 SUPPOSITORY RECTAL EVERY 6 HOURS PRN
Status: CANCELLED | OUTPATIENT
Start: 2021-01-01

## 2021-01-01 RX ORDER — ASPIRIN 81 MG/1
81 TABLET, CHEWABLE ORAL DAILY
Status: CANCELLED | OUTPATIENT
Start: 2021-01-01

## 2021-01-01 RX ORDER — CARVEDILOL 6.25 MG/1
6.25 TABLET ORAL 2 TIMES DAILY WITH MEALS
Status: CANCELLED | OUTPATIENT
Start: 2021-01-01

## 2021-01-01 RX ORDER — ATORVASTATIN CALCIUM 80 MG/1
80 TABLET, FILM COATED ORAL DAILY
Status: DISCONTINUED | OUTPATIENT
Start: 2021-01-01 | End: 2021-01-01 | Stop reason: HOSPADM

## 2021-01-01 RX ORDER — CARVEDILOL 6.25 MG/1
6.25 TABLET ORAL 2 TIMES DAILY WITH MEALS
Status: DISCONTINUED | OUTPATIENT
Start: 2021-08-30 | End: 2021-08-30 | Stop reason: HOSPADM

## 2021-01-01 RX ORDER — ATORVASTATIN CALCIUM 40 MG/1
80 TABLET, FILM COATED ORAL NIGHTLY
Status: DISCONTINUED | OUTPATIENT
Start: 2021-01-01 | End: 2021-01-01

## 2021-01-01 RX ORDER — SODIUM CHLORIDE 9 MG/ML
INJECTION, SOLUTION INTRAVENOUS CONTINUOUS
Status: DISCONTINUED | OUTPATIENT
Start: 2021-01-01 | End: 2021-08-30 | Stop reason: HOSPADM

## 2021-01-01 RX ORDER — ONDANSETRON 4 MG/1
4 TABLET, ORALLY DISINTEGRATING ORAL EVERY 8 HOURS PRN
Status: DISCONTINUED | OUTPATIENT
Start: 2021-01-01 | End: 2021-08-30 | Stop reason: HOSPADM

## 2021-01-01 RX ORDER — DEXTROSE MONOHYDRATE 25 G/50ML
12.5 INJECTION, SOLUTION INTRAVENOUS PRN
Status: DISCONTINUED | OUTPATIENT
Start: 2021-01-01 | End: 2021-01-01 | Stop reason: HOSPADM

## 2021-01-01 RX ORDER — PANTOPRAZOLE SODIUM 40 MG/1
40 TABLET, DELAYED RELEASE ORAL
Status: CANCELLED | OUTPATIENT
Start: 2021-08-30

## 2021-01-01 RX ORDER — NALOXONE HYDROCHLORIDE 0.4 MG/ML
INJECTION, SOLUTION INTRAMUSCULAR; INTRAVENOUS; SUBCUTANEOUS
Status: COMPLETED
Start: 2021-01-01 | End: 2021-01-01

## 2021-01-01 RX ORDER — ONDANSETRON 2 MG/ML
4 INJECTION INTRAMUSCULAR; INTRAVENOUS EVERY 6 HOURS PRN
Status: CANCELLED | OUTPATIENT
Start: 2021-01-01

## 2021-01-01 RX ORDER — ACETAMINOPHEN 325 MG/1
650 TABLET ORAL EVERY 6 HOURS PRN
Status: DISCONTINUED | OUTPATIENT
Start: 2021-01-01 | End: 2021-01-01

## 2021-01-01 RX ORDER — DEXTROSE MONOHYDRATE 25 G/50ML
12.5 INJECTION, SOLUTION INTRAVENOUS PRN
Status: DISCONTINUED | OUTPATIENT
Start: 2021-01-01 | End: 2021-01-01

## 2021-01-01 RX ORDER — SODIUM POLYSTYRENE SULFONATE 15 G/60ML
45 SUSPENSION ORAL; RECTAL ONCE
Status: COMPLETED | OUTPATIENT
Start: 2021-01-01 | End: 2021-01-01

## 2021-01-01 RX ORDER — ACETAMINOPHEN 325 MG/1
650 TABLET ORAL EVERY 6 HOURS PRN
Status: CANCELLED | OUTPATIENT
Start: 2021-01-01

## 2021-01-01 RX ORDER — ATORVASTATIN CALCIUM 40 MG/1
80 TABLET, FILM COATED ORAL NIGHTLY
Status: CANCELLED | OUTPATIENT
Start: 2021-01-01

## 2021-01-01 RX ORDER — ACETAMINOPHEN 650 MG/1
650 SUPPOSITORY RECTAL EVERY 6 HOURS PRN
Status: DISCONTINUED | OUTPATIENT
Start: 2021-01-01 | End: 2021-01-01 | Stop reason: HOSPADM

## 2021-01-01 RX ORDER — ASPIRIN 81 MG/1
81 TABLET, CHEWABLE ORAL DAILY
Status: DISCONTINUED | OUTPATIENT
Start: 2021-01-01 | End: 2021-01-01

## 2021-01-01 RX ORDER — FUROSEMIDE 10 MG/ML
80 INJECTION INTRAMUSCULAR; INTRAVENOUS ONCE
Status: DISCONTINUED | OUTPATIENT
Start: 2021-01-01 | End: 2021-01-01

## 2021-01-01 RX ORDER — ONDANSETRON 2 MG/ML
4 INJECTION INTRAMUSCULAR; INTRAVENOUS EVERY 6 HOURS PRN
Status: DISCONTINUED | OUTPATIENT
Start: 2021-01-01 | End: 2021-08-30 | Stop reason: HOSPADM

## 2021-01-01 RX ORDER — TORSEMIDE 20 MG/1
40 TABLET ORAL DAILY
Status: DISCONTINUED | OUTPATIENT
Start: 2021-01-01 | End: 2021-01-01

## 2021-01-01 RX ORDER — DEXTROSE MONOHYDRATE 50 MG/ML
100 INJECTION, SOLUTION INTRAVENOUS PRN
Status: CANCELLED | OUTPATIENT
Start: 2021-01-01

## 2021-01-01 RX ORDER — ISOSORBIDE MONONITRATE 60 MG/1
60 TABLET, EXTENDED RELEASE ORAL DAILY
Status: CANCELLED | OUTPATIENT
Start: 2021-01-01

## 2021-01-01 RX ORDER — INSULIN GLARGINE 100 [IU]/ML
50 INJECTION, SOLUTION SUBCUTANEOUS NIGHTLY
Status: DISCONTINUED | OUTPATIENT
Start: 2021-01-01 | End: 2021-08-30 | Stop reason: HOSPADM

## 2021-01-01 RX ORDER — ATORVASTATIN CALCIUM 80 MG/1
80 TABLET, FILM COATED ORAL DAILY
Status: CANCELLED | OUTPATIENT
Start: 2021-01-01

## 2021-01-01 RX ORDER — NALOXONE HYDROCHLORIDE 1 MG/ML
INJECTION INTRAMUSCULAR; INTRAVENOUS; SUBCUTANEOUS
Status: COMPLETED
Start: 2021-01-01 | End: 2021-01-01

## 2021-01-01 RX ORDER — NICOTINE POLACRILEX 4 MG
15 LOZENGE BUCCAL PRN
Status: DISCONTINUED | OUTPATIENT
Start: 2021-01-01 | End: 2021-01-01 | Stop reason: HOSPADM

## 2021-01-01 RX ORDER — INSULIN GLARGINE 100 [IU]/ML
45 INJECTION, SOLUTION SUBCUTANEOUS NIGHTLY
Status: DISCONTINUED | OUTPATIENT
Start: 2021-01-01 | End: 2021-01-01

## 2021-01-01 RX ORDER — HYDRALAZINE HYDROCHLORIDE 100 MG/1
100 TABLET, FILM COATED ORAL 2 TIMES DAILY
Status: DISCONTINUED | OUTPATIENT
Start: 2021-01-01 | End: 2021-01-01 | Stop reason: HOSPADM

## 2021-01-01 RX ORDER — INSULIN GLARGINE 100 [IU]/ML
10 INJECTION, SOLUTION SUBCUTANEOUS EVERY MORNING
Status: DISCONTINUED | OUTPATIENT
Start: 2021-01-01 | End: 2021-01-01

## 2021-01-01 RX ORDER — INSULIN GLARGINE 100 [IU]/ML
20 INJECTION, SOLUTION SUBCUTANEOUS EVERY MORNING
Status: DISCONTINUED | OUTPATIENT
Start: 2021-08-30 | End: 2021-08-30 | Stop reason: HOSPADM

## 2021-01-01 RX ORDER — ACETAMINOPHEN 325 MG/1
650 TABLET ORAL EVERY 6 HOURS PRN
Status: DISCONTINUED | OUTPATIENT
Start: 2021-01-01 | End: 2021-01-01 | Stop reason: HOSPADM

## 2021-01-01 RX ORDER — TRAMADOL HYDROCHLORIDE 50 MG/1
50 TABLET ORAL EVERY 6 HOURS PRN
Status: DISCONTINUED | OUTPATIENT
Start: 2021-01-01 | End: 2021-01-01 | Stop reason: HOSPADM

## 2021-01-01 RX ORDER — ETOMIDATE 2 MG/ML
INJECTION INTRAVENOUS
Status: DISPENSED
Start: 2021-01-01 | End: 2021-01-01

## 2021-01-01 RX ORDER — SODIUM CHLORIDE 9 MG/ML
25 INJECTION, SOLUTION INTRAVENOUS PRN
Status: DISCONTINUED | OUTPATIENT
Start: 2021-01-01 | End: 2021-08-30 | Stop reason: HOSPADM

## 2021-01-01 RX ORDER — ONDANSETRON 2 MG/ML
4 INJECTION INTRAMUSCULAR; INTRAVENOUS ONCE
Status: COMPLETED | OUTPATIENT
Start: 2021-01-01 | End: 2021-01-01

## 2021-01-01 RX ORDER — FERROUS SULFATE 325(65) MG
325 TABLET ORAL 2 TIMES DAILY WITH MEALS
Status: DISCONTINUED | OUTPATIENT
Start: 2021-01-01 | End: 2021-01-01

## 2021-01-01 RX ORDER — POLYETHYLENE GLYCOL 3350 17 G/17G
17 POWDER, FOR SOLUTION ORAL DAILY PRN
Status: DISCONTINUED | OUTPATIENT
Start: 2021-01-01 | End: 2021-01-01

## 2021-01-01 RX ORDER — INSULIN GLARGINE 100 [IU]/ML
50 INJECTION, SOLUTION SUBCUTANEOUS NIGHTLY
Status: CANCELLED | OUTPATIENT
Start: 2021-01-01

## 2021-01-01 RX ORDER — ASPIRIN 81 MG/1
81 TABLET, CHEWABLE ORAL DAILY
Status: CANCELLED | OUTPATIENT
Start: 2021-08-30

## 2021-01-01 RX ORDER — ISOSORBIDE MONONITRATE 60 MG/1
60 TABLET, EXTENDED RELEASE ORAL DAILY
Status: DISCONTINUED | OUTPATIENT
Start: 2021-01-01 | End: 2021-01-01 | Stop reason: HOSPADM

## 2021-01-01 RX ORDER — HYDRALAZINE HYDROCHLORIDE 100 MG/1
100 TABLET, FILM COATED ORAL 2 TIMES DAILY
Status: DISCONTINUED | OUTPATIENT
Start: 2021-01-01 | End: 2021-08-30 | Stop reason: HOSPADM

## 2021-01-01 RX ORDER — POLYETHYLENE GLYCOL 3350 17 G/17G
17 POWDER, FOR SOLUTION ORAL DAILY
Status: DISCONTINUED | OUTPATIENT
Start: 2021-08-30 | End: 2021-01-01 | Stop reason: SDUPTHER

## 2021-01-01 RX ORDER — NICOTINE POLACRILEX 4 MG
15 LOZENGE BUCCAL PRN
Status: DISCONTINUED | OUTPATIENT
Start: 2021-01-01 | End: 2021-08-30 | Stop reason: HOSPADM

## 2021-01-01 RX ORDER — INSULIN GLARGINE 100 [IU]/ML
40 INJECTION, SOLUTION SUBCUTANEOUS NIGHTLY
Status: CANCELLED | OUTPATIENT
Start: 2021-01-01

## 2021-01-01 RX ORDER — NALOXONE HYDROCHLORIDE 0.4 MG/ML
0.4 INJECTION, SOLUTION INTRAMUSCULAR; INTRAVENOUS; SUBCUTANEOUS PRN
Status: DISCONTINUED | OUTPATIENT
Start: 2021-01-01 | End: 2021-01-01 | Stop reason: HOSPADM

## 2021-01-01 RX ORDER — PANTOPRAZOLE SODIUM 40 MG/1
40 TABLET, DELAYED RELEASE ORAL
Status: DISCONTINUED | OUTPATIENT
Start: 2021-08-30 | End: 2021-01-01 | Stop reason: SDUPTHER

## 2021-01-01 RX ORDER — HYDRALAZINE HYDROCHLORIDE 100 MG/1
100 TABLET, FILM COATED ORAL 2 TIMES DAILY
Status: CANCELLED | OUTPATIENT
Start: 2021-01-01

## 2021-01-01 RX ORDER — ISOSORBIDE MONONITRATE 60 MG/1
1 TABLET, EXTENDED RELEASE ORAL DAILY
COMMUNITY
Start: 2021-01-01

## 2021-01-01 RX ORDER — ASPIRIN 81 MG/1
81 TABLET, CHEWABLE ORAL DAILY
Status: DISCONTINUED | OUTPATIENT
Start: 2021-01-01 | End: 2021-01-01 | Stop reason: HOSPADM

## 2021-01-01 RX ORDER — FUROSEMIDE 10 MG/ML
20 INJECTION INTRAMUSCULAR; INTRAVENOUS ONCE
Status: COMPLETED | OUTPATIENT
Start: 2021-01-01 | End: 2021-01-01

## 2021-01-01 RX ORDER — AMMONIUM LACTATE 12 G/100G
LOTION TOPICAL 2 TIMES DAILY
Status: DISCONTINUED | OUTPATIENT
Start: 2021-01-01 | End: 2021-08-30 | Stop reason: HOSPADM

## 2021-01-01 RX ORDER — SODIUM CHLORIDE 9 MG/ML
25 INJECTION, SOLUTION INTRAVENOUS PRN
Status: DISCONTINUED | OUTPATIENT
Start: 2021-01-01 | End: 2021-01-01 | Stop reason: HOSPADM

## 2021-01-01 RX ORDER — PANTOPRAZOLE SODIUM 40 MG/1
40 TABLET, DELAYED RELEASE ORAL
Status: DISCONTINUED | OUTPATIENT
Start: 2021-09-01 | End: 2021-01-01 | Stop reason: SDUPTHER

## 2021-01-01 RX ORDER — SODIUM CHLORIDE 9 MG/ML
INJECTION, SOLUTION INTRAVENOUS
Status: DISCONTINUED
Start: 2021-01-01 | End: 2021-08-30 | Stop reason: HOSPADM

## 2021-01-01 RX ORDER — ACETAMINOPHEN 650 MG/1
650 SUPPOSITORY RECTAL EVERY 6 HOURS PRN
Status: DISCONTINUED | OUTPATIENT
Start: 2021-01-01 | End: 2021-08-30 | Stop reason: HOSPADM

## 2021-01-01 RX ORDER — 0.9 % SODIUM CHLORIDE 0.9 %
500 INTRAVENOUS SOLUTION INTRAVENOUS ONCE
Status: COMPLETED | OUTPATIENT
Start: 2021-01-01 | End: 2021-01-01

## 2021-01-01 RX ORDER — SODIUM CHLORIDE 0.9 % (FLUSH) 0.9 %
5-40 SYRINGE (ML) INJECTION PRN
Status: DISCONTINUED | OUTPATIENT
Start: 2021-01-01 | End: 2021-01-01 | Stop reason: HOSPADM

## 2021-01-01 RX ADMIN — Medication: at 12:23

## 2021-01-01 RX ADMIN — ATORVASTATIN CALCIUM 80 MG: 40 TABLET, FILM COATED ORAL at 22:17

## 2021-01-01 RX ADMIN — POLYETHYLENE GLYCOL 3350 17 G: 17 POWDER, FOR SOLUTION ORAL at 08:45

## 2021-01-01 RX ADMIN — TORSEMIDE 40 MG: 20 TABLET ORAL at 09:22

## 2021-01-01 RX ADMIN — EPINEPHRINE 0.1 MG: 0.1 INJECTION, SOLUTION ENDOTRACHEAL; INTRACARDIAC; INTRAVENOUS at 19:55

## 2021-01-01 RX ADMIN — SODIUM POLYSTYRENE SULFONATE 45 G: 15 SUSPENSION ORAL; RECTAL at 14:28

## 2021-01-01 RX ADMIN — NALOXONE HYDROCHLORIDE 0.4 MG: 0.4 INJECTION, SOLUTION INTRAMUSCULAR; INTRAVENOUS; SUBCUTANEOUS at 12:00

## 2021-01-01 RX ADMIN — CARVEDILOL 6.25 MG: 6.25 TABLET, FILM COATED ORAL at 16:52

## 2021-01-01 RX ADMIN — CARVEDILOL 6.25 MG: 6.25 TABLET, FILM COATED ORAL at 08:49

## 2021-01-01 RX ADMIN — FERROUS SULFATE TAB 325 MG (65 MG ELEMENTAL FE) 325 MG: 325 (65 FE) TAB at 17:06

## 2021-01-01 RX ADMIN — PIPERACILLIN SODIUM AND TAZOBACTAM SODIUM 3375 MG: 3; .375 INJECTION, POWDER, LYOPHILIZED, FOR SOLUTION INTRAVENOUS at 23:13

## 2021-01-01 RX ADMIN — Medication 10 ML: at 09:02

## 2021-01-01 RX ADMIN — TRAMADOL HYDROCHLORIDE 50 MG: 50 TABLET ORAL at 17:17

## 2021-01-01 RX ADMIN — INSULIN GLARGINE 40 UNITS: 100 INJECTION, SOLUTION SUBCUTANEOUS at 20:02

## 2021-01-01 RX ADMIN — FERROUS SULFATE TAB 325 MG (65 MG ELEMENTAL FE) 325 MG: 325 (65 FE) TAB at 17:07

## 2021-01-01 RX ADMIN — ENOXAPARIN SODIUM 30 MG: 30 INJECTION SUBCUTANEOUS at 10:22

## 2021-01-01 RX ADMIN — ATORVASTATIN CALCIUM 80 MG: 80 TABLET, FILM COATED ORAL at 16:53

## 2021-01-01 RX ADMIN — PIPERACILLIN SODIUM AND TAZOBACTAM SODIUM 3375 MG: 3; .375 INJECTION, POWDER, LYOPHILIZED, FOR SOLUTION INTRAVENOUS at 16:53

## 2021-01-01 RX ADMIN — PIPERACILLIN SODIUM AND TAZOBACTAM SODIUM 3375 MG: 3; .375 INJECTION, POWDER, LYOPHILIZED, FOR SOLUTION INTRAVENOUS at 09:04

## 2021-01-01 RX ADMIN — CALCIUM GLUCONATE 1000 MG: 98 INJECTION, SOLUTION INTRAVENOUS at 03:29

## 2021-01-01 RX ADMIN — INSULIN HUMAN 10 UNITS: 100 INJECTION, SOLUTION PARENTERAL at 02:12

## 2021-01-01 RX ADMIN — Medication: at 21:20

## 2021-01-01 RX ADMIN — SODIUM ZIRCONIUM CYCLOSILICATE 10 G: 10 POWDER, FOR SUSPENSION ORAL at 20:07

## 2021-01-01 RX ADMIN — INSULIN LISPRO 6 UNITS: 100 INJECTION, SOLUTION INTRAVENOUS; SUBCUTANEOUS at 12:23

## 2021-01-01 RX ADMIN — PIPERACILLIN SODIUM AND TAZOBACTAM SODIUM 3375 MG: 3; .375 INJECTION, POWDER, LYOPHILIZED, FOR SOLUTION INTRAVENOUS at 00:21

## 2021-01-01 RX ADMIN — INSULIN GLARGINE 40 UNITS: 100 INJECTION, SOLUTION SUBCUTANEOUS at 21:33

## 2021-01-01 RX ADMIN — PIPERACILLIN SODIUM AND TAZOBACTAM SODIUM 3375 MG: 3; .375 INJECTION, POWDER, LYOPHILIZED, FOR SOLUTION INTRAVENOUS at 23:52

## 2021-01-01 RX ADMIN — TORSEMIDE 40 MG: 20 TABLET ORAL at 08:10

## 2021-01-01 RX ADMIN — INSULIN LISPRO 4 UNITS: 100 INJECTION, SOLUTION INTRAVENOUS; SUBCUTANEOUS at 12:20

## 2021-01-01 RX ADMIN — INSULIN LISPRO 10 UNITS: 100 INJECTION, SOLUTION INTRAVENOUS; SUBCUTANEOUS at 18:47

## 2021-01-01 RX ADMIN — Medication: at 06:00

## 2021-01-01 RX ADMIN — SODIUM CHLORIDE 1000 ML: 9 INJECTION, SOLUTION INTRAVENOUS at 18:48

## 2021-01-01 RX ADMIN — Medication: at 05:00

## 2021-01-01 RX ADMIN — CARVEDILOL 6.25 MG: 6.25 TABLET, FILM COATED ORAL at 18:06

## 2021-01-01 RX ADMIN — HYDRALAZINE HYDROCHLORIDE 100 MG: 100 TABLET, FILM COATED ORAL at 16:52

## 2021-01-01 RX ADMIN — PIPERACILLIN SODIUM AND TAZOBACTAM SODIUM 3375 MG: 3; .375 INJECTION, POWDER, LYOPHILIZED, FOR SOLUTION INTRAVENOUS at 18:05

## 2021-01-01 RX ADMIN — DEXTROSE MONOHYDRATE 25 G: 25 INJECTION, SOLUTION INTRAVENOUS at 02:11

## 2021-01-01 RX ADMIN — Medication 10 ML: at 23:44

## 2021-01-01 RX ADMIN — APIXABAN 2.5 MG: 2.5 TABLET, FILM COATED ORAL at 22:17

## 2021-01-01 RX ADMIN — APIXABAN 2.5 MG: 2.5 TABLET, FILM COATED ORAL at 08:10

## 2021-01-01 RX ADMIN — POLYETHYLENE GLYCOL 3350 17 G: 17 POWDER, FOR SOLUTION ORAL at 08:09

## 2021-01-01 RX ADMIN — CARVEDILOL 6.25 MG: 6.25 TABLET, FILM COATED ORAL at 08:51

## 2021-01-01 RX ADMIN — FUROSEMIDE 10 MG/HR: 10 INJECTION, SOLUTION INTRAMUSCULAR; INTRAVENOUS at 02:50

## 2021-01-01 RX ADMIN — PIPERACILLIN SODIUM AND TAZOBACTAM SODIUM 3375 MG: 3; .375 INJECTION, POWDER, LYOPHILIZED, FOR SOLUTION INTRAVENOUS at 14:15

## 2021-01-01 RX ADMIN — APIXABAN 2.5 MG: 2.5 TABLET, FILM COATED ORAL at 21:19

## 2021-01-01 RX ADMIN — INSULIN LISPRO 4 UNITS: 100 INJECTION, SOLUTION INTRAVENOUS; SUBCUTANEOUS at 09:02

## 2021-01-01 RX ADMIN — Medication: at 08:09

## 2021-01-01 RX ADMIN — TORSEMIDE 40 MG: 20 TABLET ORAL at 10:41

## 2021-01-01 RX ADMIN — METOPROLOL TARTRATE 2.5 MG: 5 INJECTION INTRAVENOUS at 09:19

## 2021-01-01 RX ADMIN — MORPHINE SULFATE 4 MG: 4 INJECTION, SOLUTION INTRAMUSCULAR; INTRAVENOUS at 00:21

## 2021-01-01 RX ADMIN — TRAMADOL HYDROCHLORIDE 50 MG: 50 TABLET, FILM COATED ORAL at 12:27

## 2021-01-01 RX ADMIN — Medication 10 ML: at 10:43

## 2021-01-01 RX ADMIN — Medication 10 ML: at 21:10

## 2021-01-01 RX ADMIN — INSULIN LISPRO 6 UNITS: 100 INJECTION, SOLUTION INTRAVENOUS; SUBCUTANEOUS at 16:34

## 2021-01-01 RX ADMIN — HYDRALAZINE HYDROCHLORIDE 100 MG: 100 TABLET, FILM COATED ORAL at 10:41

## 2021-01-01 RX ADMIN — EPINEPHRINE 30 MCG: 0.1 INJECTION, SOLUTION ENDOTRACHEAL; INTRACARDIAC; INTRAVENOUS at 19:40

## 2021-01-01 RX ADMIN — ONDANSETRON 4 MG: 2 INJECTION INTRAMUSCULAR; INTRAVENOUS at 00:13

## 2021-01-01 RX ADMIN — INSULIN LISPRO 2 UNITS: 100 INJECTION, SOLUTION INTRAVENOUS; SUBCUTANEOUS at 08:50

## 2021-01-01 RX ADMIN — FUROSEMIDE 40 MG: 10 INJECTION, SOLUTION INTRAMUSCULAR; INTRAVENOUS at 06:09

## 2021-01-01 RX ADMIN — DOXAZOSIN 2 MG: 1 TABLET ORAL at 16:55

## 2021-01-01 RX ADMIN — CARVEDILOL 6.25 MG: 6.25 TABLET, FILM COATED ORAL at 10:41

## 2021-01-01 RX ADMIN — SODIUM CHLORIDE 500 ML: 9 INJECTION, SOLUTION INTRAVENOUS at 02:10

## 2021-01-01 RX ADMIN — NALOXONE HYDROCHLORIDE 1 MG: 1 INJECTION INTRAMUSCULAR; INTRAVENOUS; SUBCUTANEOUS at 03:08

## 2021-01-01 RX ADMIN — FUROSEMIDE 20 MG: 10 INJECTION INTRAMUSCULAR; INTRAVENOUS at 12:07

## 2021-01-01 RX ADMIN — APIXABAN 2.5 MG: 2.5 TABLET, FILM COATED ORAL at 08:45

## 2021-01-01 RX ADMIN — INSULIN GLARGINE 40 UNITS: 100 INJECTION, SOLUTION SUBCUTANEOUS at 22:56

## 2021-01-01 RX ADMIN — INSULIN LISPRO 4 UNITS: 100 INJECTION, SOLUTION INTRAVENOUS; SUBCUTANEOUS at 18:06

## 2021-01-01 RX ADMIN — SODIUM ZIRCONIUM CYCLOSILICATE 10 G: 10 POWDER, FOR SUSPENSION ORAL at 13:42

## 2021-01-01 RX ADMIN — FERROUS SULFATE TAB 325 MG (65 MG ELEMENTAL FE) 325 MG: 325 (65 FE) TAB at 08:10

## 2021-01-01 RX ADMIN — ATORVASTATIN CALCIUM 80 MG: 40 TABLET, FILM COATED ORAL at 21:19

## 2021-01-01 RX ADMIN — INSULIN GLARGINE 10 UNITS: 100 INJECTION, SOLUTION SUBCUTANEOUS at 08:50

## 2021-01-01 RX ADMIN — FUROSEMIDE 40 MG: 10 INJECTION, SOLUTION INTRAMUSCULAR; INTRAVENOUS at 12:14

## 2021-01-01 RX ADMIN — PIPERACILLIN SODIUM AND TAZOBACTAM SODIUM 3375 MG: 3; .375 INJECTION, POWDER, LYOPHILIZED, FOR SOLUTION INTRAVENOUS at 08:53

## 2021-01-01 RX ADMIN — SODIUM POLYSTYRENE SULFONATE 15 G: 15 SUSPENSION ORAL; RECTAL at 02:11

## 2021-01-01 RX ADMIN — INSULIN GLARGINE 40 UNITS: 100 INJECTION, SOLUTION SUBCUTANEOUS at 21:22

## 2021-01-01 RX ADMIN — SODIUM CHLORIDE 500 ML: 9 INJECTION, SOLUTION INTRAVENOUS at 16:04

## 2021-01-01 RX ADMIN — Medication: at 09:20

## 2021-01-01 RX ADMIN — ASPIRIN 81 MG CHEWABLE TABLET 81 MG: 81 TABLET CHEWABLE at 09:20

## 2021-01-01 RX ADMIN — Medication: at 08:45

## 2021-01-01 RX ADMIN — PIPERACILLIN SODIUM AND TAZOBACTAM SODIUM 3375 MG: 3; .375 INJECTION, POWDER, LYOPHILIZED, FOR SOLUTION INTRAVENOUS at 08:37

## 2021-01-01 RX ADMIN — ACETAMINOPHEN 650 MG: 325 TABLET ORAL at 08:49

## 2021-01-01 RX ADMIN — APIXABAN 2.5 MG: 2.5 TABLET, FILM COATED ORAL at 21:32

## 2021-01-01 RX ADMIN — DOXAZOSIN 2 MG: 1 TABLET ORAL at 10:41

## 2021-01-01 RX ADMIN — ASPIRIN 81 MG: 81 TABLET, CHEWABLE ORAL at 10:41

## 2021-01-01 RX ADMIN — POLYETHYLENE GLYCOL 3350 17 G: 17 POWDER, FOR SOLUTION ORAL at 09:29

## 2021-01-01 RX ADMIN — Medication: at 21:09

## 2021-01-01 RX ADMIN — INSULIN GLARGINE 40 UNITS: 100 INJECTION, SOLUTION SUBCUTANEOUS at 21:09

## 2021-01-01 RX ADMIN — FUROSEMIDE 10 MG/HR: 10 INJECTION, SOLUTION INTRAMUSCULAR; INTRAVENOUS at 22:40

## 2021-01-01 RX ADMIN — APIXABAN 2.5 MG: 2.5 TABLET, FILM COATED ORAL at 16:53

## 2021-01-01 RX ADMIN — INSULIN LISPRO 8 UNITS: 100 INJECTION, SOLUTION INTRAVENOUS; SUBCUTANEOUS at 17:04

## 2021-01-01 RX ADMIN — ENOXAPARIN SODIUM 30 MG: 30 INJECTION SUBCUTANEOUS at 08:49

## 2021-01-01 RX ADMIN — PANTOPRAZOLE SODIUM 40 MG: 40 INJECTION, POWDER, FOR SOLUTION INTRAVENOUS at 16:43

## 2021-01-01 RX ADMIN — INSULIN GLARGINE 40 UNITS: 100 INJECTION, SOLUTION SUBCUTANEOUS at 21:55

## 2021-01-01 RX ADMIN — PIPERACILLIN SODIUM AND TAZOBACTAM SODIUM 3375 MG: 3; .375 INJECTION, POWDER, LYOPHILIZED, FOR SOLUTION INTRAVENOUS at 17:00

## 2021-01-01 RX ADMIN — INSULIN GLARGINE 20 UNITS: 100 INJECTION, SOLUTION SUBCUTANEOUS at 08:10

## 2021-01-01 RX ADMIN — ASPIRIN 81 MG: 81 TABLET, CHEWABLE ORAL at 16:52

## 2021-01-01 RX ADMIN — CARVEDILOL 6.25 MG: 6.25 TABLET, FILM COATED ORAL at 17:06

## 2021-01-01 RX ADMIN — PIPERACILLIN SODIUM AND TAZOBACTAM SODIUM 3375 MG: 3; .375 INJECTION, POWDER, LYOPHILIZED, FOR SOLUTION INTRAVENOUS at 23:46

## 2021-01-01 RX ADMIN — SODIUM CHLORIDE 8 MG/HR: 9 INJECTION, SOLUTION INTRAVENOUS at 19:04

## 2021-01-01 RX ADMIN — CALCIUM GLUCONATE 1000 MG: 98 INJECTION, SOLUTION INTRAVENOUS at 14:15

## 2021-01-01 RX ADMIN — FUROSEMIDE 10 MG/HR: 10 INJECTION, SOLUTION INTRAMUSCULAR; INTRAVENOUS at 12:17

## 2021-01-01 RX ADMIN — TORSEMIDE 40 MG: 20 TABLET ORAL at 21:55

## 2021-01-01 RX ADMIN — SODIUM BICARBONATE 50 MEQ: 84 INJECTION INTRAVENOUS at 12:06

## 2021-01-01 RX ADMIN — APIXABAN 2.5 MG: 2.5 TABLET, FILM COATED ORAL at 10:41

## 2021-01-01 RX ADMIN — FUROSEMIDE 10 MG/HR: 10 INJECTION, SOLUTION INTRAMUSCULAR; INTRAVENOUS at 15:21

## 2021-01-01 RX ADMIN — PIPERACILLIN SODIUM AND TAZOBACTAM SODIUM 3375 MG: 3; .375 INJECTION, POWDER, LYOPHILIZED, FOR SOLUTION INTRAVENOUS at 10:41

## 2021-01-01 RX ADMIN — INSULIN LISPRO 4 UNITS: 100 INJECTION, SOLUTION INTRAVENOUS; SUBCUTANEOUS at 10:23

## 2021-01-01 RX ADMIN — Medication 10 ML: at 23:13

## 2021-01-01 RX ADMIN — INSULIN HUMAN 10 UNITS: 100 INJECTION, SOLUTION PARENTERAL at 14:16

## 2021-01-01 RX ADMIN — ATORVASTATIN CALCIUM 80 MG: 80 TABLET, FILM COATED ORAL at 10:41

## 2021-01-01 RX ADMIN — INSULIN GLARGINE 40 UNITS: 100 INJECTION, SOLUTION SUBCUTANEOUS at 22:18

## 2021-01-01 RX ADMIN — APIXABAN 2.5 MG: 2.5 TABLET, FILM COATED ORAL at 09:20

## 2021-01-01 RX ADMIN — Medication 10 ML: at 10:28

## 2021-01-01 RX ADMIN — FERROUS SULFATE TAB 325 MG (65 MG ELEMENTAL FE) 325 MG: 325 (65 FE) TAB at 08:45

## 2021-01-01 RX ADMIN — INSULIN LISPRO 2 UNITS: 100 INJECTION, SOLUTION INTRAVENOUS; SUBCUTANEOUS at 09:06

## 2021-01-01 RX ADMIN — DEXTROSE MONOHYDRATE 25 G: 25 INJECTION, SOLUTION INTRAVENOUS at 14:14

## 2021-01-01 RX ADMIN — Medication 10 ML: at 09:00

## 2021-01-01 RX ADMIN — Medication: at 23:44

## 2021-01-01 RX ADMIN — SODIUM ZIRCONIUM CYCLOSILICATE 10 G: 10 POWDER, FOR SUSPENSION ORAL at 11:28

## 2021-01-01 RX ADMIN — NOREPINEPHRINE BITARTRATE 20 MCG: 1 INJECTION, SOLUTION INTRAVENOUS at 20:00

## 2021-01-01 RX ADMIN — Medication 10 ML: at 08:38

## 2021-01-01 RX ADMIN — CARVEDILOL 6.25 MG: 6.25 TABLET, FILM COATED ORAL at 17:07

## 2021-01-01 RX ADMIN — ASPIRIN 81 MG CHEWABLE TABLET 81 MG: 81 TABLET CHEWABLE at 08:45

## 2021-01-01 RX ADMIN — ASPIRIN 81 MG CHEWABLE TABLET 81 MG: 81 TABLET CHEWABLE at 08:10

## 2021-01-01 RX ADMIN — CARVEDILOL 6.25 MG: 6.25 TABLET, FILM COATED ORAL at 08:10

## 2021-01-01 RX ADMIN — INSULIN LISPRO 2 UNITS: 100 INJECTION, SOLUTION INTRAVENOUS; SUBCUTANEOUS at 16:30

## 2021-01-01 RX ADMIN — HYDRALAZINE HYDROCHLORIDE 100 MG: 25 TABLET, FILM COATED ORAL at 21:32

## 2021-01-01 RX ADMIN — FERROUS SULFATE TAB 325 MG (65 MG ELEMENTAL FE) 325 MG: 325 (65 FE) TAB at 17:16

## 2021-01-01 RX ADMIN — FERROUS SULFATE TAB 325 MG (65 MG ELEMENTAL FE) 325 MG: 325 (65 FE) TAB at 09:20

## 2021-01-01 RX ADMIN — TORSEMIDE 40 MG: 20 TABLET ORAL at 08:45

## 2021-01-01 RX ADMIN — FERROUS SULFATE TAB 325 MG (65 MG ELEMENTAL FE) 325 MG: 325 (65 FE) TAB at 10:40

## 2021-01-01 RX ADMIN — FERROUS SULFATE TAB 325 MG (65 MG ELEMENTAL FE) 325 MG: 325 (65 FE) TAB at 16:52

## 2021-01-01 RX ADMIN — ISOSORBIDE MONONITRATE 60 MG: 60 TABLET, EXTENDED RELEASE ORAL at 10:42

## 2021-01-01 RX ADMIN — INSULIN LISPRO 8 UNITS: 100 INJECTION, SOLUTION INTRAVENOUS; SUBCUTANEOUS at 11:36

## 2021-01-01 RX ADMIN — ISOSORBIDE MONONITRATE 60 MG: 60 TABLET, EXTENDED RELEASE ORAL at 16:52

## 2021-01-01 RX ADMIN — ENOXAPARIN SODIUM 30 MG: 30 INJECTION SUBCUTANEOUS at 09:04

## 2021-01-01 RX ADMIN — EPINEPHRINE 0.1 MG: 0.1 INJECTION, SOLUTION ENDOTRACHEAL; INTRACARDIAC; INTRAVENOUS at 19:25

## 2021-01-01 RX ADMIN — ASPIRIN 81 MG: 81 TABLET, COATED ORAL at 21:09

## 2021-01-01 RX ADMIN — ENOXAPARIN SODIUM 30 MG: 30 INJECTION SUBCUTANEOUS at 08:37

## 2021-01-01 RX ADMIN — CARVEDILOL 6.25 MG: 6.25 TABLET, FILM COATED ORAL at 09:19

## 2021-01-01 SDOH — ECONOMIC STABILITY: FOOD INSECURITY: WITHIN THE PAST 12 MONTHS, YOU WORRIED THAT YOUR FOOD WOULD RUN OUT BEFORE YOU GOT MONEY TO BUY MORE.: NEVER TRUE

## 2021-01-01 SDOH — ECONOMIC STABILITY: FOOD INSECURITY: WITHIN THE PAST 12 MONTHS, THE FOOD YOU BOUGHT JUST DIDN'T LAST AND YOU DIDN'T HAVE MONEY TO GET MORE.: NEVER TRUE

## 2021-01-01 ASSESSMENT — PAIN SCALES - GENERAL
PAINLEVEL_OUTOF10: 0
PAINLEVEL_OUTOF10: 0
PAINLEVEL_OUTOF10: 6
PAINLEVEL_OUTOF10: 0
PAINLEVEL_OUTOF10: 7
PAINLEVEL_OUTOF10: 0
PAINLEVEL_OUTOF10: 6
PAINLEVEL_OUTOF10: 8
PAINLEVEL_OUTOF10: 0
PAINLEVEL_OUTOF10: 3
PAINLEVEL_OUTOF10: 0
PAINLEVEL_OUTOF10: 7
PAINLEVEL_OUTOF10: 0
PAINLEVEL_OUTOF10: 0
PAINLEVEL_OUTOF10: 2
PAINLEVEL_OUTOF10: 0
PAINLEVEL_OUTOF10: 8
PAINLEVEL_OUTOF10: 0
PAINLEVEL_OUTOF10: 8
PAINLEVEL_OUTOF10: 0
PAINLEVEL_OUTOF10: 7
PAINLEVEL_OUTOF10: 0
PAINLEVEL_OUTOF10: 10
PAINLEVEL_OUTOF10: 0

## 2021-01-01 ASSESSMENT — ENCOUNTER SYMPTOMS
CHOKING: 0
NAUSEA: 0
COUGH: 0
VOMITING: 0
NAUSEA: 0
ABDOMINAL DISTENTION: 0
ABDOMINAL PAIN: 0
WHEEZING: 0
CHEST TIGHTNESS: 0
ALLERGIC/IMMUNOLOGIC NEGATIVE: 1
BACK PAIN: 0
COLOR CHANGE: 0
SHORTNESS OF BREATH: 1
NAUSEA: 0
COLOR CHANGE: 0
WHEEZING: 0
PHOTOPHOBIA: 0
BACK PAIN: 0
ABDOMINAL PAIN: 0
ABDOMINAL DISTENTION: 1
SHORTNESS OF BREATH: 1
BACK PAIN: 0
SHORTNESS OF BREATH: 1
APNEA: 0
SINUS PRESSURE: 0
ABDOMINAL PAIN: 1
CONSTIPATION: 0
PHOTOPHOBIA: 0
BACK PAIN: 0
EYE PAIN: 0
VOMITING: 0
SHORTNESS OF BREATH: 0
COLOR CHANGE: 0
SHORTNESS OF BREATH: 0
EYE DISCHARGE: 0
TROUBLE SWALLOWING: 0
COUGH: 0
SHORTNESS OF BREATH: 0
SORE THROAT: 0
TROUBLE SWALLOWING: 0
VOMITING: 0
CHOKING: 0
EYE PAIN: 0
SINUS PAIN: 0
ABDOMINAL PAIN: 0
COLOR CHANGE: 0
NAUSEA: 0
ABDOMINAL PAIN: 0
DIARRHEA: 0
SINUS PRESSURE: 0
COUGH: 0
DIARRHEA: 0
VOMITING: 0
TROUBLE SWALLOWING: 0
VOMITING: 0
SHORTNESS OF BREATH: 0
SHORTNESS OF BREATH: 0
BLOOD IN STOOL: 1
SORE THROAT: 0
BACK PAIN: 0
TROUBLE SWALLOWING: 0
CHOKING: 0
TROUBLE SWALLOWING: 0
ALLERGIC/IMMUNOLOGIC NEGATIVE: 1
BACK PAIN: 0
SHORTNESS OF BREATH: 0
CHEST TIGHTNESS: 0

## 2021-01-01 ASSESSMENT — PAIN DESCRIPTION - PAIN TYPE
TYPE: ACUTE PAIN

## 2021-01-01 ASSESSMENT — PAIN DESCRIPTION - ORIENTATION
ORIENTATION: RIGHT;LEFT
ORIENTATION: RIGHT;LEFT
ORIENTATION: RIGHT
ORIENTATION: LEFT;RIGHT
ORIENTATION: LEFT
ORIENTATION: RIGHT

## 2021-01-01 ASSESSMENT — PAIN DESCRIPTION - LOCATION
LOCATION: HEAD;BACK
LOCATION: BUTTOCKS
LOCATION: LEG
LOCATION: SHOULDER
LOCATION: SHOULDER
LOCATION: BACK;HEAD
LOCATION: BUTTOCKS

## 2021-01-01 ASSESSMENT — PATIENT HEALTH QUESTIONNAIRE - PHQ9
SUM OF ALL RESPONSES TO PHQ QUESTIONS 1-9: 0
1. LITTLE INTEREST OR PLEASURE IN DOING THINGS: 0
2. FEELING DOWN, DEPRESSED OR HOPELESS: 0
SUM OF ALL RESPONSES TO PHQ9 QUESTIONS 1 & 2: 0

## 2021-01-01 ASSESSMENT — PAIN DESCRIPTION - PROGRESSION

## 2021-01-01 ASSESSMENT — PULMONARY FUNCTION TESTS
PIF_VALUE: 7.4
PIF_VALUE: 19
PIF_VALUE: 17
PIF_VALUE: 18
PIF_VALUE: 18
PIF_VALUE: 8.5
PIF_VALUE: 17
PIF_VALUE: 21
PIF_VALUE: 20
PIF_VALUE: 18
PIF_VALUE: 19
PIF_VALUE: 18
PIF_VALUE: 19
PIF_VALUE: 17
PIF_VALUE: 19
PIF_VALUE: 20
PIF_VALUE: 7.7
PIF_VALUE: 19

## 2021-01-01 ASSESSMENT — PAIN DESCRIPTION - DESCRIPTORS
DESCRIPTORS: CONSTANT;ACHING;SORE
DESCRIPTORS: ACHING;HEADACHE
DESCRIPTORS: ACHING
DESCRIPTORS: ACHING

## 2021-01-01 ASSESSMENT — PAIN DESCRIPTION - FREQUENCY
FREQUENCY: CONTINUOUS

## 2021-01-01 ASSESSMENT — PAIN DESCRIPTION - ONSET
ONSET: ON-GOING
ONSET: ON-GOING

## 2021-01-01 ASSESSMENT — SOCIAL DETERMINANTS OF HEALTH (SDOH): HOW HARD IS IT FOR YOU TO PAY FOR THE VERY BASICS LIKE FOOD, HOUSING, MEDICAL CARE, AND HEATING?: NOT HARD AT ALL

## 2021-01-01 ASSESSMENT — PAIN - FUNCTIONAL ASSESSMENT: PAIN_FUNCTIONAL_ASSESSMENT: PREVENTS OR INTERFERES SOME ACTIVE ACTIVITIES AND ADLS

## 2021-04-14 NOTE — TELEPHONE ENCOUNTER
Wife called because she cannot get in touch with the wound center. She is asking if you can call in saline solution so she can clean the wound on patient's leg. They use [facility] on \[facility]\"" - [address]. Wife also asked if you had the blood work results from Dr. Tirso Prajapati. (They have been scanned into media). Wife just had shoulder surgery and will schedule an appt to bring patient in as soon as she is able to drive. Thank you.

## 2021-04-16 NOTE — TELEPHONE ENCOUNTER
If they have filled it at [facility] - confirm what they are using with the pharmacy

## 2021-08-12 PROBLEM — S81.802S LEG WOUND, LEFT, SEQUELA: Status: ACTIVE | Noted: 2019-12-04

## 2021-08-12 NOTE — PATIENT INSTRUCTIONS
Patient Education        Wound Check: Care Instructions  Your Care Instructions  People have wounds that need care for many reasons. You may have a cut that needs care after surgery. You may have a cut or puncture wound from an accident. Or you may have a wound because of a condition like diabetes. Whatever the cause of your wound, there are things you can do to care for it at home. Your doctor may also want you to come back for a wound check. The wound check lets the doctor know how your wound is healing and if you need more treatment. Follow-up care is a key part of your treatment and safety. Be sure to make and go to all appointments, and call your doctor if you are having problems. It's also a good idea to know your test results and keep a list of the medicines you take. How can you care for yourself at home? · If your doctor told you how to care for your wound, follow your doctor's instructions. If you did not get instructions, follow this general advice:  ? You may cover the wound with a thin layer of petroleum jelly, such as Vaseline, and a nonstick bandage. ? Apply more petroleum jelly and replace the bandage as needed. · Keep the wound dry for the first 24 to 48 hours. After this, you can shower if your doctor okays it. Pat the wound dry. · Be safe with medicines. Read and follow all instructions on the label. ? If the doctor gave you a prescription medicine for pain, take it as prescribed. ? If you are not taking a prescription pain medicine, ask your doctor if you can take an over-the-counter medicine. · If your doctor prescribed antibiotics, take them as directed. Do not stop taking them just because you feel better. You need to take the full course of antibiotics. · If you have stitches, do not remove them on your own. Your doctor will tell you when to come back to have them removed. · If you have Steri-Strips, leave them on until they fall off.   · If possible, prop up the injured area on a pillow anytime you sit or lie down during the next 3 days. Try to keep it above the level of your heart. This will help reduce swelling. When should you call for help? Call your doctor now or seek immediate medical care if:    · You have new pain, or the pain gets worse.     · The skin near the wound is cold or pale or changes color.     · You have tingling, weakness, or numbness near the wound.     · The wound starts to bleed, and blood soaks through the bandage. Oozing small amounts of blood is normal.     · You have symptoms of infection, such as:  ? Increased pain, swelling, warmth, or redness. ? Red streaks leading from the wound. ? Pus draining from the wound. ? A fever. Watch closely for changes in your health, and be sure to contact your doctor if:    · You do not get better as expected. Where can you learn more? Go to https://[employer].Sanergy. org and sign in to your Snapwiz account. Enter  in the [employer]One box to learn more about \"Wound Check: Care Instructions. \"     If you do not have an account, please click on the \"Sign Up Now\" link. Current as of: October 19, 2020               Content Version: 12.9  © 2006-2021 Healthwise, [employer]. Care instructions adapted under license by [facility] ([facility]). If you have questions about a medical condition or this instruction, always ask your healthcare professional. [place] [address] any warranty or liability for your use of this information. Patient Education        Wound Check: Care Instructions  Your Care Instructions  People have wounds that need care for many reasons. You may have a cut that needs care after surgery. You may have a cut or puncture wound from an accident. Or you may have a wound because of a condition like diabetes. Whatever the cause of your wound, there are things you can do to care for it at home. Your doctor may also want you to come back for a wound check.  The wound check lets the doctor know how your wound is healing and if you need more treatment. Follow-up care is a key part of your treatment and safety. Be sure to make and go to all appointments, and call your doctor if you are having problems. It's also a good idea to know your test results and keep a list of the medicines you take. How can you care for yourself at home? · If your doctor told you how to care for your wound, follow your doctor's instructions. If you did not get instructions, follow this general advice:  ? You may cover the wound with a thin layer of petroleum jelly, such as Vaseline, and a nonstick bandage. ? Apply more petroleum jelly and replace the bandage as needed. · Keep the wound dry for the first 24 to 48 hours. After this, you can shower if your doctor okays it. Pat the wound dry. · Be safe with medicines. Read and follow all instructions on the label. ? If the doctor gave you a prescription medicine for pain, take it as prescribed. ? If you are not taking a prescription pain medicine, ask your doctor if you can take an over-the-counter medicine. · If your doctor prescribed antibiotics, take them as directed. Do not stop taking them just because you feel better. You need to take the full course of antibiotics. · If you have stitches, do not remove them on your own. Your doctor will tell you when to come back to have them removed. · If you have Steri-Strips, leave them on until they fall off. · If possible, prop up the injured area on a pillow anytime you sit or lie down during the next 3 days. Try to keep it above the level of your heart. This will help reduce swelling. When should you call for help?    Call your doctor now or seek immediate medical care if:    · You have new pain, or the pain gets worse.     · The skin near the wound is cold or pale or changes color.     · You have tingling, weakness, or numbness near the wound.     · The wound starts to bleed, and blood soaks through the bandage. Oozing small amounts of blood is normal.     · You have symptoms of infection, such as:  ? Increased pain, swelling, warmth, or redness. ? Red streaks leading from the wound. ? Pus draining from the wound. ? A fever. Watch closely for changes in your health, and be sure to contact your doctor if:    · You do not get better as expected. Where can you learn more? Go to https://Toad MedicalpeSoundHoundeweb.SocialDeck. org and sign in to your Conexus-IT account. Enter  in the Magnet[employer]s box to learn more about \"Wound Check: Care Instructions. \"     If you do not have an account, please click on the \"Sign Up Now\" link. Current as of: October 19, 2020               Content Version: 12.9  © 4287-5718 Healthwise, Incorporated. Care instructions adapted under license by [facility] ([facility]). If you have questions about a medical condition or this instruction, always ask your healthcare professional. [place] [address] any warranty or liability for your use of this information.

## 2021-08-12 NOTE — PROGRESS NOTES
Subjective:      Patient ID: Vazquez Stark is a 80 y.o. male who presents today for:  Chief Complaint   Patient presents with    Leg Injury     Patient here with leg oozing bilateral        HPI  80year old male who presents with bilateral leg wounds. He has had these wound for nearly 3 years. Over the past two weeks these wound have become progressively worse. They are seeping yellow purulent fluid. There is a foul smelling odor to both wounds. Denies fevers and chills. Past Medical History:   Diagnosis Date    Anemia     Atrial fibrillation ([address]     CAD (coronary artery disease)     Hyperlipidemia     Hypertension     Type II or unspecified type diabetes mellitus without mention of complication, not stated as uncontrolled      Past Surgical History:   Procedure Laterality Date    CATARACT REMOVAL WITH IMPLANT Right     2013 - cathy walters    FOOT FRACTURE SURGERY  1980s    work related - fell off a ladder    PTCA  2008    4 stents - jill walters    ROTATOR CUFF REPAIR Right 2009     Social History     Socioeconomic History    Marital status:      Spouse name: Not on file    Number of children: Not on file    Years of education: Not on file    Highest education level: Not on file   Occupational History    Not on file   Tobacco Use    Smoking status: Never Smoker    Smokeless tobacco: Never Used   Substance and Sexual Activity    Alcohol use: No    Drug use: No    Sexual activity: Never   Other Topics Concern    Not on file   Social History Narrative    Not on file     Social Determinants of Health     Financial Resource Strain: Low Risk     Difficulty of Paying Living Expenses: Not hard at all   Food Insecurity: No Food Insecurity    Worried About Running Out of Food in the Last Year: Never true    Tate of Food in the Last Year: Never true   Transportation Needs:     Lack of Transportation (Medical):      Lack of Transportation (Non-Medical):    Physical Activity:     Days of Exercise per Week:     Minutes of Exercise per Session:    Stress:     Feeling of Stress :    Social Connections:     Frequency of Communication with Friends and Family:     Frequency of Social Gatherings with Friends and Family:     Attends [employer] Services:     Active Member of Clubs or Organizations:     Attends Club or Organization Meetings:     Marital Status:    Intimate Partner Violence:     Fear of Current or Ex-Partner:     Emotionally Abused:     Physically Abused:     Sexually Abused:      No family history on file.   No Known Allergies  Current Outpatient Medications   Medication Sig Dispense Refill    cephALEXin (KEFLEX) 500 MG capsule Take 1 capsule by mouth 4 times daily for 10 days 20 capsule 0    sulfamethoxazole-trimethoprim (BACTRIM DS;SEPTRA DS) 800-160 MG per tablet Take 1 tablet by mouth 2 times daily for 10 days 20 tablet 0    SODIUM CHLORIDE, EXTERNAL, 0.9 % SOLN Apply 1 Squirt topically 2 times daily 1 Can 3    ferrous sulfate (KP FERROUS SULFATE) 325 (65 Fe) MG tablet take 1 tablet by mouth twice a day 180 tablet 1    omeprazole (PRILOSEC) 20 MG delayed release capsule TAKE 1 CAPSULE EVERY DAY 90 capsule 2    finasteride (PROSCAR) 5 MG tablet TAKE 1 TABLET EVERY DAY 90 tablet 3    apixaban (ELIQUIS) 2.5 MG TABS tablet Take 1 tablet by mouth 2 times daily 60 tablet 3    losartan (COZAAR) 50 MG tablet Take 1 tablet by mouth daily 30 tablet 3    torsemide (DEMADEX) 20 MG tablet Take 2 tablets by mouth daily (Patient taking differently: Take 20 mg by mouth daily ) 60 tablet 3    carvedilol (COREG) 12.5 MG tablet Take 1 tablet by mouth 2 times daily (with meals) 60 tablet 3    insulin glargine (LANTUS) 100 UNIT/ML injection vial Inject 40 Units into the skin nightly (Patient taking differently: Inject 45 Units into the skin nightly ) 1 vial 3    Insulin Aspart (NOVOLOG FLEXPEN SC) Inject 18 Units into the skin Daily with supper       vitamin B-12 (CYANOCOBALAMIN) 1000 oriented to person, place, and time. Mental status is at baseline. Cranial Nerves: No cranial nerve deficit. Sensory: No sensory deficit. Motor: No weakness. Coordination: Coordination normal.      Deep Tendon Reflexes: Reflexes are normal and symmetric. Psychiatric:         Mood and Affect: Mood normal.         Behavior: Behavior normal. Behavior is cooperative. Thought Content: Thought content normal.         Judgment: Judgment normal.         Assessment:       Diagnosis Orders   1. Leg wound, left, sequela  Culture, Wound    Culture, Wound    cephALEXin (KEFLEX) 500 MG capsule    sulfamethoxazole-trimethoprim (BACTRIM DS;SEPTRA DS) 800-160 MG per tablet    Ambulatory referral to Wound Clinic   2. Wound of right lower extremity, subsequent encounter  Culture, Wound    Culture, Wound    cephALEXin (KEFLEX) 500 MG capsule    sulfamethoxazole-trimethoprim (BACTRIM DS;SEPTRA DS) 800-160 MG per tablet    Ambulatory referral to Wound Clinic     No results found for this visit on 08/12/21. Plan:     Assessment & Plan   Kerri Barroso was seen today for leg injury. Diagnoses and all orders for this visit:    Leg wound, left, sequela  -     Culture, Wound; Future  -     Culture, Wound; Future  -     cephALEXin (KEFLEX) 500 MG capsule; Take 1 capsule by mouth 4 times daily for 10 days  -     sulfamethoxazole-trimethoprim (BACTRIM DS;SEPTRA DS) 800-160 MG per tablet; Take 1 tablet by mouth 2 times daily for 10 days  -     Ambulatory referral to Wound Clinic    Wound of right lower extremity, subsequent encounter  -     Culture, Wound; Future  -     Culture, Wound; Future  -     cephALEXin (KEFLEX) 500 MG capsule; Take 1 capsule by mouth 4 times daily for 10 days  -     sulfamethoxazole-trimethoprim (BACTRIM DS;SEPTRA DS) 800-160 MG per tablet;  Take 1 tablet by mouth 2 times daily for 10 days  -     Ambulatory referral to Wound Clinic      Orders Placed This Encounter   Procedures    Culture, Wound Left lower     Standing Status:   Future     Standing Expiration Date:   8/12/2022    Culture, Wound     Right lower     Standing Status:   Future     Standing Expiration Date:   8/12/2022    Ambulatory referral to Wound Clinic     Referral Priority:   Routine     Referral Type:   Eval and Treat     Referral Reason:   Specialty Services Required     Number of Visits Requested:   1     Orders Placed This Encounter   Medications    cephALEXin (KEFLEX) 500 MG capsule     Sig: Take 1 capsule by mouth 4 times daily for 10 days     Dispense:  20 capsule     Refill:  0    sulfamethoxazole-trimethoprim (BACTRIM DS;SEPTRA DS) 800-160 MG per tablet     Sig: Take 1 tablet by mouth 2 times daily for 10 days     Dispense:  20 tablet     Refill:  0     There are no discontinued medications. Return if symptoms worsen or fail to improve. Reviewed with the patient/family: current clinical status & medications. Side effects of the medication prescribed today, as well as treatment plan/rationale and result expectations have been discussed with the patient/family who expresses understanding. Patient will be discharged home in stable condition. Follow up with PCP to evaluate treatment results or return if symptoms worsen or fail to improve. Discussed signs and symptoms which require immediate follow-up in ED/call to 911. Understanding verbalized. I have reviewed the patient's medical history in detail and updated the computerized patient record.     LISSET Perrin

## 2021-08-19 NOTE — PROGRESS NOTES
Subjective  Carlos De La Cruz, 80 y.o. male presents today with:  Chief Complaint   Patient presents with    Eczema     He complains of having dry flaking skin on his legs. Treatment Adherence:   Medication compliance:  compliant all of the time  Diet compliance:  compliant most of the time  Weight trend: stable  Current exercise: no regular exercise      Diabetes Mellitus Type 2: Current symptoms/problems include none. Home blood sugar records:  fasting range: took this morning doesnt remember number  Any episodes of hypoglycemia? no  Eye exam current (within one year): yes  Tobacco history: He  reports that he has never smoked. He has never used smokeless tobacco.   Daily Aspirin? Yes  Known diabetic complications: none        Lab Results   Component Value Date    LABA1C 8.4 02/24/2020    LABA1C 8.1 10/29/2019    LABA1C 9.1 09/26/2019     Lab Results   Component Value Date    LABMICR 2.70 (H) 03/12/2019    CREATININE 1.59 (H) 06/15/2021     Lab Results   Component Value Date    ALT 15 02/24/2020    AST 18 02/24/2020     Lab Results   Component Value Date    CHOL 107 08/06/2019    TRIG 123 08/06/2019    HDL 32 (L) 08/06/2019    LDLCALC 50 08/06/2019          HPI   Patient is here for urgent care follow-up for cellulitis bilateral lower extremities denies any abdominal pain diarrhea nausea on antibiotic regimen  Patient has not been staining a year otherwise he feels he has been doing well followed closely by the [facility] for control of his diabetes and Luis Crowder MD [address] [facility] for his cad  Patient is ambulatory around his house but is mostly sedentary denies chest pain pressure shortness of breath      Review of Systems   Constitutional: Positive for fatigue. Negative for chills and fever. HENT: Negative for congestion, ear discharge, ear pain, sinus pressure, sinus pain and sore throat. Eyes: Negative for pain and discharge. Respiratory: Positive for shortness of breath.  Negative for cough, chest tightness and wheezing. Cardiovascular: Positive for leg swelling. Negative for chest pain. Gastrointestinal: Negative for abdominal pain, constipation, diarrhea, nausea and vomiting. Endocrine: Negative. Genitourinary: Negative for difficulty urinating, dysuria, frequency and urgency. Musculoskeletal: Negative for back pain and neck pain. Skin: Positive for rash. Allergic/Immunologic: Negative. Neurological: Negative for dizziness and headaches. Hematological: Negative. Psychiatric/Behavioral: Negative. All other systems reviewed and are negative.         Past Medical History:   Diagnosis Date    Anemia     Atrial fibrillation ([address]     CAD (coronary artery disease)     Hyperlipidemia     Hypertension     Type II or unspecified type diabetes mellitus without mention of complication, not stated as uncontrolled      Past Surgical History:   Procedure Laterality Date    CATARACT REMOVAL WITH IMPLANT Right     2013 - cathy walters    FOOT FRACTURE SURGERY  1980s    work related - fell off a ladder    PTCA  2008    4 stents - jill walters    ROTATOR CUFF REPAIR Right 2009     Social History     Socioeconomic History    Marital status:      Spouse name: Not on file    Number of children: Not on file    Years of education: Not on file    Highest education level: Not on file   Occupational History    Not on file   Tobacco Use    Smoking status: Never Smoker    Smokeless tobacco: Never Used   Substance and Sexual Activity    Alcohol use: No    Drug use: No    Sexual activity: Never   Other Topics Concern    Not on file   Social History Narrative    Not on file     Social Determinants of Health     Financial Resource Strain: Low Risk     Difficulty of Paying Living Expenses: Not hard at all   Food Insecurity: No Food Insecurity    Worried About Running Out of Food in the Last Year: Never true    [place] of Food in the Last Year: Never true   Transportation Needs:     Lack of Transportation (Medical):  Lack of Transportation (Non-Medical):    Physical Activity:     Days of Exercise per Week:     Minutes of Exercise per Session:    Stress:     Feeling of Stress :    Social Connections:     Frequency of Communication with Friends and Family:     Frequency of Social Gatherings with Friends and Family:     Attends [employer] Services:     Active Member of Clubs or Organizations:     Attends Club or Organization Meetings:     Marital Status:    Intimate Partner Violence:     Fear of Current or Ex-Partner:     Emotionally Abused:     Physically Abused:     Sexually Abused:      History reviewed. No pertinent family history.   No Known Allergies     Current Outpatient Medications   Medication Sig Dispense Refill    ciprofloxacin (CIPRO) 500 MG tablet Take 1 tablet by mouth 2 times daily for 10 days 20 tablet 0    cephALEXin (KEFLEX) 500 MG capsule Take 1 capsule by mouth 4 times daily for 10 days 20 capsule 0    sulfamethoxazole-trimethoprim (BACTRIM DS;SEPTRA DS) 800-160 MG per tablet Take 1 tablet by mouth 2 times daily for 10 days 20 tablet 0    SODIUM CHLORIDE, EXTERNAL, 0.9 % SOLN Apply 1 Squirt topically 2 times daily 1 Can 3    ferrous sulfate ( FERROUS SULFATE) 325 (65 Fe) MG tablet take 1 tablet by mouth twice a day 180 tablet 1    omeprazole (PRILOSEC) 20 MG delayed release capsule TAKE 1 CAPSULE EVERY DAY 90 capsule 2    finasteride (PROSCAR) 5 MG tablet TAKE 1 TABLET EVERY DAY 90 tablet 3    apixaban (ELIQUIS) 2.5 MG TABS tablet Take 1 tablet by mouth 2 times daily 60 tablet 3    losartan (COZAAR) 50 MG tablet Take 1 tablet by mouth daily 30 tablet 3    torsemide (DEMADEX) 20 MG tablet Take 2 tablets by mouth daily (Patient taking differently: Take 20 mg by mouth daily m-w-f 1 TABLET TWICE DAILY AND Tue, Thur sat and Sunday once a day) 60 tablet 3    carvedilol (COREG) 12.5 MG tablet Take 1 tablet by mouth 2 times daily (with meals) 60 tablet 3    lower leg: Edema present. Comments: Trace les   Lymphadenopathy:      Cervical: No cervical adenopathy. Skin:     General: Skin is warm and dry. Findings: Lesion and rash present. Comments: improving   Neurological:      Mental Status: He is alert and oriented to person, place, and time. Comments: Exam from wheelchair   Psychiatric:         Mood and Affect: Mood normal.         Behavior: Behavior normal.         Thought Content: Thought content normal.         Judgment: Judgment normal.              Assessment & Plan    Diagnosis Orders   1. Type 2 diabetes mellitus with other specified complication, unspecified whether long term insulin use (HCC)  POCT glycosylated hemoglobin (Hb A1C)    Comprehensive Metabolic Panel    Hemoglobin A1C   2. CKD stage 3 due to type 2 diabetes mellitus ([facility])  Comprehensive Metabolic Panel   3. Cellulitis of lower extremity, unspecified laterality     4. Coronary artery disease involving native coronary artery of native heart without angina pectoris  isosorbide mononitrate (IMDUR) 60 MG extended release tablet    Comprehensive Metabolic Panel   5. Hyperlipidemia, unspecified hyperlipidemia type  Lipid, Fasting   6. Essential hypertension  doxazosin (CARDURA) 2 MG tablet    hydrALAZINE (APRESOLINE) 100 MG tablet    isosorbide mononitrate (IMDUR) 60 MG extended release tablet    controlled   7. Xerosis of skin  TSH with Reflex   8.  Iron deficiency anemia, unspecified iron deficiency anemia type  CBC Auto Differential         Orders Placed This Encounter   Procedures    Lipid, Fasting     Standing Status:   Future     Standing Expiration Date:   8/19/2022    Comprehensive Metabolic Panel     Standing Status:   Future     Standing Expiration Date:   8/19/2022    TSH with Reflex     Standing Status:   Future     Standing Expiration Date:   8/19/2022    CBC Auto Differential     Standing Status:   Future     Standing Expiration Date:   8/19/2022    Hemoglobin A1C Standing Status:   Future     Standing Expiration Date:   8/19/2022    POCT glycosylated hemoglobin (Hb A1C)     No orders of the defined types were placed in this encounter. There are no discontinued medications. Return in about 1 year (around 8/19/2022) for call for results.     Xiomy Moreno PA-C\

## 2021-08-22 PROBLEM — M79.651 RIGHT THIGH PAIN: Status: ACTIVE | Noted: 2021-01-01

## 2021-08-22 PROBLEM — I10 HYPERTENSION: Status: ACTIVE | Noted: 2020-01-01

## 2021-08-22 PROBLEM — I48.91 ATRIAL FIBRILLATION (HCC): Status: ACTIVE | Noted: 2018-09-28

## 2021-08-22 PROBLEM — E87.5 HYPERKALEMIA: Status: ACTIVE | Noted: 2021-01-01

## 2021-08-22 NOTE — ED PROVIDER NOTES
Past Surgical History:   Procedure Laterality Date    CATARACT REMOVAL WITH IMPLANT Right     2013 - cathy walters    FOOT FRACTURE SURGERY  1980s    work related - fell off a ladder    PTCA  2008    4 stents - jill walters    ROTATOR CUFF REPAIR Right 2009         CURRENT MEDICATIONS       Previous Medications    APIXABAN (ELIQUIS) 2.5 MG TABS TABLET    Take 1 tablet by mouth 2 times daily    ASPIRIN 81 MG TABLET    Take 81 mg by mouth daily.       ATORVASTATIN (LIPITOR) 80 MG TABLET    Take 1 tablet by mouth daily    CARVEDILOL (COREG) 12.5 MG TABLET    Take 1 tablet by mouth 2 times daily (with meals)    CEPHALEXIN (KEFLEX) 500 MG CAPSULE    Take 1 capsule by mouth 4 times daily for 10 days    CIPROFLOXACIN (CIPRO) 500 MG TABLET    Take 1 tablet by mouth 2 times daily for 10 days    DOXAZOSIN (CARDURA) 2 MG TABLET    Take 1 tablet by mouth daily    FERROUS SULFATE (KP FERROUS SULFATE) 325 (65 FE) MG TABLET    take 1 tablet by mouth twice a day    FINASTERIDE (PROSCAR) 5 MG TABLET    TAKE 1 TABLET EVERY DAY    HYDRALAZINE (APRESOLINE) 100 MG TABLET    Take 1 tablet by mouth 2 times daily    INSULIN ASPART (NOVOLOG FLEXPEN SC)    Inject 15 Units into the skin Daily with supper     INSULIN GLARGINE (LANTUS) 100 UNIT/ML INJECTION VIAL    Inject 40 Units into the skin nightly    ISOSORBIDE MONONITRATE (IMDUR) 60 MG EXTENDED RELEASE TABLET    Take 1 tablet by mouth daily    LOSARTAN (COZAAR) 50 MG TABLET    Take 1 tablet by mouth daily    OMEPRAZOLE (PRILOSEC) 20 MG DELAYED RELEASE CAPSULE    TAKE 1 CAPSULE EVERY DAY    SODIUM CHLORIDE, EXTERNAL, 0.9 % SOLN    Apply 1 Squirt topically 2 times daily    SULFAMETHOXAZOLE-TRIMETHOPRIM (BACTRIM DS;SEPTRA DS) 800-160 MG PER TABLET    Take 1 tablet by mouth 2 times daily for 10 days    TORSEMIDE (DEMADEX) 20 MG TABLET    Take 2 tablets by mouth daily    VITAMIN B-12 (CYANOCOBALAMIN) 1000 MCG TABLET    Take 1,000 mcg by mouth daily       ALLERGIES     Patient has no known allergies. FAMILY HISTORY     No family history on file. SOCIAL HISTORY       Social History     Socioeconomic History    Marital status:      Spouse name: Not on file    Number of children: Not on file    Years of education: Not on file    Highest education level: Not on file   Occupational History    Not on file   Tobacco Use    Smoking status: Never Smoker    Smokeless tobacco: Never Used   Substance and Sexual Activity    Alcohol use: No    Drug use: No    Sexual activity: Never   Other Topics Concern    Not on file   Social History Narrative    Not on file     Social Determinants of Health     Financial Resource Strain: Low Risk     Difficulty of Paying Living Expenses: Not hard at all   Food Insecurity: No Food Insecurity    Worried About [address] in the Last Year: Never true    [address] [employer] in the Last Year: Never true   Transportation Needs:     Lack of Transportation (Medical):  Lack of Transportation (Non-Medical):    Physical Activity:     Days of Exercise per Week:     Minutes of Exercise per Session:    Stress:     Feeling of Stress :    Social Connections:     Frequency of Communication with Friends and Family:     Frequency of Social Gatherings with Friends and Family:     Attends [employer] Services:     Active Member of Clubs or Organizations:     Attends Club or Organization Meetings:     Marital Status:    Intimate Partner Violence:     Fear of Current or Ex-Partner:     Emotionally Abused:     Physically Abused:     Sexually Abused:        SCREENINGS                        PHYSICAL EXAM    (up to 7 for level 4, 8 or more for level 5)     ED Triage Vitals [08/21/21 2133]   BP Temp Temp Source Pulse Resp SpO2 Height Weight   (!) 145/47 99 °F (37.2 °C) Oral 70 -- -- 5' 10\" (1.778 m) 205 lb (93 kg)       Physical Exam  Vitals and nursing note reviewed. Constitutional:       General: He is not in acute distress.      Appearance: He is well-developed. He is not diaphoretic. HENT:      Head: Normocephalic and atraumatic. Mouth/Throat:      Pharynx: No oropharyngeal exudate. Eyes:      General: No scleral icterus. Conjunctiva/sclera: Conjunctivae normal.      Pupils: Pupils are equal, round, and reactive to light. Neck:      Trachea: No tracheal deviation. Cardiovascular:      Rate and Rhythm: Normal rate. Heart sounds: Normal heart sounds. Pulmonary:      Effort: Pulmonary effort is normal. No respiratory distress. Breath sounds: Normal breath sounds. Abdominal:      General: Bowel sounds are normal. There is no distension. Palpations: Abdomen is soft. Musculoskeletal:         General: Normal range of motion. Cervical back: Normal range of motion and neck supple. Right upper leg: Tenderness present. Right lower leg: Edema present. Left lower leg: Edema present. Legs:    Skin:     General: Skin is warm and dry. Findings: No erythema or rash. Neurological:      Mental Status: He is alert and oriented to person, place, and time. Cranial Nerves: No cranial nerve deficit. Motor: No abnormal muscle tone. Psychiatric:         Behavior: Behavior normal.         Thought Content:  Thought content normal.         Judgment: Judgment normal.         DIAGNOSTIC RESULTS     EKG: All EKG's are interpreted by the Emergency Department Physician who either signs or Co-signs this chart in the absence of a cardiologist.    Normal sinus rhythm, rate 78 bpm, no peak T waves or acute ST elevation    RADIOLOGY:   Non-plain film images such as CT, Ultrasound and MRI are read by the radiologist. Plain radiographic images are visualized and preliminarily interpreted by the emergency physician with the below findings:    Ultrasound and x-ray negative    Interpretation per the Radiologist below, if available at the time of this note:    US DUP LOWER EXTREMITY RIGHT DANICA    (Results Pending)   XR laboratory testing reveals a significant hyperkalemia. Family does state the patient has a history of hyperkalemia in the past but potassium is 6.8 today. No peaked T waves on exam of EKG. Patient also was found to have an BART with a creatinine of 2.5. Call was placed to the hospitalist to discuss admission for further evaluation and care. Patient had received 4 mg of morphine while in the emergency department for pain control. Patient was noted to have increased drowsiness and lethargy due to morphine. Patient was put on supplemental oxygen to maintain oxygen saturation. Patient sleeping and difficult to arouse so he was given 1 mg of Narcan in the emergency department which did improve patient's alertness. Patient was able to drink Kayexalate without difficulty after receiving Narcan. Oxygen saturation continued to be maintained with supplemental oxygen. Hospitalist requested consult to nephrology due to severe hyperkalemia. Call has been placed to nephrology but patient was signed out to attending physician Dr. Janet Palacios prior to receiving callback from nephrology. CONSULTS:  None    PROCEDURES:  Unless otherwise noted below, none     Procedures        FINAL IMPRESSION      1. Hyperkalemia    2. Right leg pain    3. BART (acute kidney injury) [facility])          DISPOSITION/PLAN   DISPOSITION Decision To Admit 08/22/2021 01:48:02 AM      PATIENT REFERRED TO:  No follow-up provider specified. DISCHARGE MEDICATIONS:  New Prescriptions    No medications on file     Controlled Substances Monitoring:     No flowsheet data found.     (Please note that portions of this note were completed with a voice recognition program.  Efforts were made to edit the dictations but occasionally words are mis-transcribed.)    Keegan Phillip PA-C (electronically signed)  Attending Emergency Physician            Keegan Phillip PA-C  08/22/21 0150       Keegan Phillip PA-C  08/22/21 5548

## 2021-08-22 NOTE — PROGRESS NOTES
Physician Progress Note    2021   2:21 PM    Name:  Pauly Lee  MRN:    [id number]     IP Day: 0     Admit Date: 2021  9:32 PM  PCP: Newton Hylton PA-C    Code Status:  Full Code      Assessment and Plan: Active Problems/ diagnosis:     Acute hypoxic and hypercapnic respiratory failure  Acute encephalopathy  BART on CKD  Hyperkalemia-possibly due to BART on CKD 3  Obesity  Right hip pain-muscle cramps  Atrial fibrillation on anticoagulation  Type 2 diabetes on insulin  Hypertension  History of CAD    Plan  1. Transfer the patient to the ICU given lethargy on BiPAP, acidemia and hypercapnia. May need intubation. Discussed with intensivist  2. Treat hyperkalemia, give Lasix IV, sodium bicarb, calcium gluconate. Kayexalate ordered but not able to keep p.o. at this time. Repeat BMP. Nephrology has been consulted by my colleague overnight  3. Critical care consult  4. Monitor electrolytes and renal function  5. Started on empiric Zosyn  6. Agree with CT head ordered by critical care  7. N.p.o.  8. DVT prophylaxis    Subjective:     Poor historian. Upon my exam on 3 W., patient is lethargic and not able to answer any question while on BiPAP. Immediately moved him to the ICU. Discussed with intensivist.    Physical Examination:      Vitals:  BP (!) 168/59   Pulse 72   Temp 97.3 °F (36.3 °C) (Bladder)   Resp 23   Ht 5' 10\" (1.778 m)   Wt 197 lb 15.6 oz (89.8 kg)   SpO2 100%   BMI 28.41 kg/m²   Temp (24hrs), Av.1 °F (36.7 °C), Min:97.3 °F (36.3 °C), Max:99 °F (37.2 °C)      General appearance: Lethargic. On BiPAP.   Mental Status: Oriented x0 but not waking up  Lungs: Diminished bilaterally, no wheezing  Heart: regular rate and rhythm, no murmur  Abdomen: soft, nondistended, bowel sounds present, no masses  Extremities: no edema, redness, tenderness in the calves  Skin: no gross lesions, rashes  Neurologically-lethargic    Data:     Labs:  Recent Labs     21  3351 Jennifer Hanna, APRN - CNP        dextrose 50 % IV solution  12.5 g Intravenous PRN Jennifer Hanna, APRN - CNP        glucagon (rDNA) injection 1 mg  1 mg Intramuscular PRN Jennifer Hanna, APRN - CNP        dextrose 5 % solution  100 mL/hr Intravenous PRN Jennifer Hanna, APRN - CNP        insulin glargine (LANTUS) injection vial 40 Units  40 Units Subcutaneous Nightly TEJAS Faye - CNP        sodium polystyrene (KAYEXALATE) 15 GM/60ML suspension 45 g  45 g Oral Once Linda Hendrix DO        calcium gluconate 1,000 mg in dextrose 5 % 100 mL IVPB  1,000 mg Intravenous Once Linda Hendrix DO        etomidate (AMIDATE) 2 MG/ML injection             piperacillin-tazobactam (ZOSYN) 3,375 mg in dextrose 5 % 50 mL IVPB extended infusion (mini-bag)  3,375 mg Intravenous Q8H Glynn Boudreaux MD 12.5 mL/hr at 08/22/21 1415 3,375 mg at 08/22/21 1415    naloxone (NARCAN) injection 0.4 mg  0.4 mg Intravenous PRN Glynn Boudreaux MD        calcium gluconate 1,000 mg in dextrose 5 % 100 mL IVPB  1,000 mg Intravenous Once Glynn Boudreaux  mL/hr at 08/22/21 1415 1,000 mg at 08/22/21 1415    dextrose 50 % IV solution  25 g Intravenous PRN Glynn Boudreaux MD   25 g at 08/22/21 1414       Additional work up or/and treatment plan may be added today or then after based on clinical progression. I am managing a portion of pt care. Some medical issues are handled by other specialists. Additional work up and treatment should be done in out pt setting by pt PCP and other out pt providers. In addition to examining and evaluating pt, I spent additional time explaining care, normaland abnormal findings, and treatment plan. All of pt questions were answered. Counseling, diet and education were provided. Case will be discussed with nursing staff when appropriate. Family will be updated if and when appropriate.        Electronically signed by Linda Hendrix DO on 8/22/2021 at 2:21 PM

## 2021-08-22 NOTE — CONSULTS
Pulmonary and Critical Care Medicine  Consult Note  Encounter Date: 2021 11:44 AM    Mr. Kenyon Jackson is a 80 y.o. male  : 1931  Requesting Provider: Cora Verma DO    Reason for request: Acute respiratory failure            HISTORY OF PRESENT ILLNESS:    Patient is 80 y.o. presents with right hip pain, he was found to be hyperkalemic, he received 4 mg of morphine for pain in ED, became less responsive, he received Narcan later without response, he was admitted to the floor earlier this morning, was noted to be in acute on chronic hypercapnic respiratory failure, patient laying in bed, open his eyes, does not move or follow commands, staring, no reported fever, no bowel movement, no fever since admission, blood pressure remained stable, patient transferred today to ICU due to persistent encephalopathy and hypercapnia          Past Medical History:        Diagnosis Date    Anemia     Atrial fibrillation ([address]     CAD (coronary artery disease)     Hyperlipidemia     Hypertension     Type II or unspecified type diabetes mellitus without mention of complication, not stated as uncontrolled        Past Surgical History:        Procedure Laterality Date    CATARACT REMOVAL WITH IMPLANT Right      - cathy walters    FOOT FRACTURE SURGERY  1980s    work related - fell off a ladder    PTCA  2008    4 stents - jill walters    ROTATOR CUFF REPAIR Right        Social History:     reports that he has never smoked. He has never used smokeless tobacco. He reports that he does not drink alcohol and does not use drugs. Family History:   History reviewed. No pertinent family history. Not obtainable due to patient current mental status  Allergies:  Patient has no known allergies.         MEDICATIONS during current hospitalization:    Continuous Infusions:   sodium chloride      dextrose         Scheduled Meds:   sodium chloride flush  5-40 mL Intravenous 2 times per day    enoxaparin  30 mg Subcutaneous Daily    insulin lispro  0-12 Units Subcutaneous TID     insulin lispro  0-6 Units Subcutaneous Nightly    insulin glargine  40 Units Subcutaneous Nightly    sodium polystyrene  45 g Oral Once    furosemide  20 mg Intravenous Once    sodium bicarbonate  50 mEq Intravenous Once    calcium gluconate IVPB  1,000 mg Intravenous Once    etomidate           PRN Meds:sodium chloride flush, sodium chloride, ondansetron **OR** ondansetron, acetaminophen **OR** acetaminophen, glucose, dextrose, glucagon (rDNA), dextrose        REVIEW OF SYSTEMS: Not obtainable due to patient current mental status    PHYSICAL EXAM:    Vitals:  BP (!) 129/55   Pulse 71   Temp 97.9 °F (36.6 °C) (Axillary)   Resp 18   Ht 5' 10\" (1.778 m)   Wt 205 lb (93 kg)   SpO2 99%   BMI 29.41 kg/m²     General: Eyes open, does not follow commands  Head: normocephalic, atraumatic  Eyes: Sluggish reaction to light, clear sclera  ENT:  MMM no lesions  Neck:  supple and no masses  Chest : Diminished air sounds, bibasilar rales, no wheezing, nontender, tympanic  Heart[de-identified] Heart sounds are normal.  Regular rate and rhythm without murmur, gallop or rub. ABD:  symmetric, soft, non-tender, no apparent guarding or rebound  Musculoskeletal : no cyanosis, no clubbing, trace edema and chronic ulcers bilateral lower extremities with venous static changes  Neuro:  Stating, does not follow commands, encephalopathic, occasionally moves left upper extremity, one time he moves right upper extremity but not to command  Skin: No rashes or nodules noted. Lymph node:  no cervical nodes  Urology: No Mackay   Psychiatric: Calm        Data Review  Recent Labs     08/21/21 2145 08/21/21 2145 08/22/21  0432 08/22/21  0739 08/22/21  1111   WBC 11.0*  --   --   --   --    HGB 9.6*   < > 10.4* 10.3* 10.0*   HCT 29.8*  --   --   --   --      --   --   --   --     < > = values in this interval not displayed.       Recent Labs     08/21/21 2145 08/22/21 0432 08/22/21  0739 08/22/21  1111     --   --   --    K 6.8*  --   --   --      --   --   --    CO2 24  --   --   --    BUN 50*  --   --   --    CREATININE 2.68* 2.9* 3.1* 2.9*   GLUCOSE 195*  --   --   --        MV Settings: ABGs:   Recent Labs     08/22/21 0432 08/22/21  0739 08/22/21  1111   PHART 7.190* 7.216* 7.225*   [id number] 77* 71* 71*   PO2ART 98* 124* 124*   [id number] 29.3* 28.6 29.5*   BEART 1 1 2   S1PFEORK 95* 98* 98*   RDO2ZMT 32* 31* 32*     O2 Device: Nasal cannula  O2 Flow Rate (L/min): 2 L/min  Lab Results   Component Value Date    LACTA 1.4 08/21/2021       Radiology       Lower extremity duplex ultrasound is negative  Epic everywhere reviewed, echo done at [address] [place] [address] shows EF 40 to 45%  Assessment, plan: This is a critically ill patient at risk of deterioration / death , needing close ICU monitoring and intervention due to below noted problems   · Acute encephalopathy  · Secondary to acute hypercapnic respiratory failure  · Chronic kidney disease, seems to be worse than baseline, possible acute on chronic renal failure  · Hyperkalemia    Recommendation  · Adjusted BiPAP  · Repeat ABG in 1 hour if not improving will intubate  · PUD prophylaxis  · DVT prophylaxis  · Chest x-ray  · CT head  · Empiric antibiotics  · Trophic tube feed  · Monitor blood sugar target 140-180  · Monitor electrolyte  · Lokelma  · Appreciate nephrology  · Panculture         Due to the immediate potential for life-threatening deterioration due to acute respiratory failure, I spent 39 minutes providing critical care. This time is excluding time spent performing procedures.       Thank you for consultation    Electronically signed by Glynn Boudreaux MD, [facility],  on 8/22/2021 at 11:44 AM

## 2021-08-22 NOTE — PROGRESS NOTES
Pharmacy Dose Adjustment Per Protocol    Mary Delgado is a 80 y.o. male. Recent Labs     08/21/21  2145 08/22/21  0432 08/22/21  0739 08/22/21  1111   BUN 50*  --   --   --    CREATININE 2.68*   < > 3.1* 2.9*    < > = values in this interval not displayed. Estimated Creatinine Clearance: 20 mL/min (A) (based on SCr of 2.9 mg/dL (H)). Rajwinder Izquierdo Height: 5' 10\" (177.8 cm), Weight: 205 lb (93 kg), Body mass index is 29.41 kg/m².     Piperacillin/Tazobactam [Zosyn]      Medication Ordered:   Traditional Dosing Change to Extended Infusion:   CrCl ?20 ml/min [] Zosyn 2.25 gm q6-8 hr [x] Zosyn 3.375 gm IV q8h, infuse over 4 hr    [x] Zosyn 3.375 gm q6-8 hr     [] Zosyn 4.5 gm q6-8 hr    CrCl <20 ml/min or ESRD [] Zosyn 2.25 gm q6-8 hr [] Zosyn 3.375 gm IV q12h, infuse over 4 hr    [] Zosyn 3.375 gm q6-8 hr     [] Zosyn 4.5 gm q6-8 hr      Thank you,  Pharmacy to monitor daily    [facility]  Staff Pharmacist, Edmond Mccarty  8/22/2021  12:01 PM

## 2021-08-22 NOTE — PROGRESS NOTES
Patient admitted form ER for Hyperkalemia. Pt very lethargic will open eyes briefly with sternal rub at the time of admission. Shallow breathing, and mouth breather sat 88-92 on 3 L of O2. .   ABG and EKG ordered ,  patient placed on bipap. Pt resting in the bed at this time. 0501 AM Called pt wife to complete admission screening. Updated on patient conditions. Pt continues to be in deep sleep, VS stable will continue to monitor.

## 2021-08-22 NOTE — ED NOTES
Bed: 25  Expected date: 8/21/21  Expected time: 9:18 PM  Means of arrival: [facility]  Comments:  80 M  Leg pain  No trauma or fall  VSS  + blood thinners     Renita Spatz, RN  08/21/21 6044

## 2021-08-22 NOTE — PROGRESS NOTES
11:55am Received from 4Wt tower via bed to CCU 17.  Responds to sternal rub not responsive to voice. VSS Dr Mary Atkins in room. BIPAP on 18/7.  12 noon #16 Temp arevalo inserted immediate retrun of clear tony urine. Tolerated procedure well. 12:30pm Wife here and updated on status. 4wt called for patient clothes  1500pm Daughter here at bedside and updated on status  1555pm Dr Mary Atkins in and updated on status. 17:30pm Dr Mary Atkins called and updated on status. Orders  1800pm wife and son at bedside and updated.  Urine obtained and sent

## 2021-08-22 NOTE — ED PROVIDER NOTES
Signout received from [employer]. Patient here for leg cramps and was diagnosed with hyperkalemia and acute kidney injury. Patient's was in acute kidney injury with elevated potassium. No EKG changes. Patient received Kayexalate, insulin dextrose. Dr. Lana Escobar wanted nephrology consulted for admission. Spoke with Dr. Baxter Meals patient does not need stat dialysis. Spoke with Letty Downing Accepted patient admission.      Tico Mendez MD  08/22/21 9016

## 2021-08-22 NOTE — ED TRIAGE NOTES
Pt came [facility] reports right leg pain started today denies any injury. Pt alert oreinted x 3 skin warm dry pink left lower leg wrapped pt states hx of staph infection wife changed it today.

## 2021-08-22 NOTE — PROGRESS NOTES
Pharmacy Dose Adjustment Per Protocol    Valeriy Thomas is a 80 y.o. male. Recent Labs     08/21/21  2145 08/22/21  0432 08/22/21  0739 08/22/21  1111   BUN 50*  --   --   --    CREATININE 2.68*   < > 3.1* 2.9*    < > = values in this interval not displayed. Estimated Creatinine Clearance: 20 mL/min (A) (based on SCr of 2.9 mg/dL (H)). Lazaro Benson Height: 5' 10\" (177.8 cm), Weight: 205 lb (93 kg), Body mass index is 29.41 kg/m².     Piperacillin/Tazobactam [Zosyn]      Medication Ordered:   Traditional Dosing Change to Extended Infusion:   CrCl ?20 ml/min [] Zosyn 2.25 gm q6-8 hr [x] Zosyn 3.375 gm IV q8h, infuse over 4 hr    [x] Zosyn 3.375 gm q6-8 hr     [] Zosyn 4.5 gm q6-8 hr    CrCl <20 ml/min or ESRD [] Zosyn 2.25 gm q6-8 hr [] Zosyn 3.375 gm IV q12h, infuse over 4 hr    [] Zosyn 3.375 gm q6-8 hr     [] Zosyn 4.5 gm q6-8 hr      Thank you,  Pharmacy to monitor daily     [facility]  Staff Pharmacist, Edmond Mccarty  8/22/2021  12:01 PM

## 2021-08-23 NOTE — PROGRESS NOTES
Wound Ostomy Continence Nursing    WOCN consulted for leg wounds. I spoke to Violet DIAL in regards to this patient and encouraged Podiatry referral from hospitalist to assess legs. No other skin issues at this time. Will follow if any other skin issues arise.     Electronically signed by Jamaica Villa RN on 8/23/2021 at 4:56 PM

## 2021-08-23 NOTE — PROGRESS NOTES
Shift summary:    Handoff of care at bedside. Mackay draining cherry colored urine with sediment to gravity. Patient has bi-pap on and is tolerating well. Patient does not appear to respond to voice, but responds to minor tactile stimulation. ABGs drawn by respiratory therapist.  Results called to Dr. Mare Tucker. No new orders received. Dr. Matt Vázquez made aware. Wife called for update on patient. HIPAA code requested and received. Wife was able to assist in the completion of the admission database. Wife was concerned for the patient's condition. Reassurance and consoling given to the wife. At approximately 0130 patient was attempting to verbalize while in bi-pap. Patient able to move all extremities with equal strength. Patient was orientated as to where he was and why he was here. Patient turned his head and closed his eyes to sleep. Patient continues to remove his gown and telemetry leads. This RN attempted to redirect the patient to keep the equipment and gown. No evidence of patient comprehending instructions. On a couple of occasions, the patient pulled off the tubing from the bi-pap. The tubing was reattached to the machine. 0600 patient was yelling that he wanted the bi-pap off. This RN contacted the respiratory therapist and she stated to remove the mask and replace it with 4 liters nasal canula. Will continue to monitor closely.

## 2021-08-23 NOTE — CONSULTS
[facility]  Consult [facility]           Reason for Consult:  Jeremy, hyperkalemia, ckd stage 4, fluid overload  [facility]    Chief Complaint:  Pain in legs  History Obtained From:  patient, electronic medical record    History of Present Ilness:    80y.o. year old male admitted with pain in legs found to have severe hyperkalemia. Patient has several risk factors for chronic kidney disease including diabetes mellitus hypertension coronary artery disease. Ejection fraction around 50%. Denies any NSAID use. Maintained on torsemide and Cozaar. GFR has been around 30-40 dating back to 2018. Somewhat worse recently. Came in with a creatinine in the mid to upper twos with a potassium of 6.8. Potassium has come down to 6 but still high. He has a Mackay catheter and making urine. Transferred to ICU with hypercarbic respiratory failure. On BiPAP. Past Medical History:        Diagnosis Date    Anemia     Atrial fibrillation ([address]     CAD (coronary artery disease)     Hyperlipidemia     Hypertension     Type II or unspecified type diabetes mellitus without mention of complication, not stated as uncontrolled        Past Surgical History:        Procedure Laterality Date    CATARACT REMOVAL WITH IMPLANT Right     2013 - cathy walters    FOOT FRACTURE SURGERY  1980s    work related - fell off a ladder    PTCA  2008    4 stents - jill walters    ROTATOR CUFF REPAIR Right 2009       Home Medications:    No current facility-administered medications on file prior to encounter.      Current Outpatient Medications on File Prior to Encounter   Medication Sig Dispense Refill    doxazosin (CARDURA) 2 MG tablet Take 1 tablet by mouth daily      hydrALAZINE (APRESOLINE) 100 MG tablet Take 1 tablet by mouth 2 times daily      isosorbide mononitrate (IMDUR) 60 MG extended release tablet Take 1 tablet by mouth daily      ciprofloxacin (CIPRO) 500 MG tablet Take 1 tablet by mouth 2 times daily for 10 days 20 tablet 0    SODIUM CHLORIDE, EXTERNAL, 0.9 % SOLN Apply 1 Squirt topically 2 times daily 1 Can 3    ferrous sulfate (KP FERROUS SULFATE) 325 (65 Fe) MG tablet take 1 tablet by mouth twice a day 180 tablet 1    omeprazole (PRILOSEC) 20 MG delayed release capsule TAKE 1 CAPSULE EVERY DAY 90 capsule 2    finasteride (PROSCAR) 5 MG tablet TAKE 1 TABLET EVERY DAY 90 tablet 3    apixaban (ELIQUIS) 2.5 MG TABS tablet Take 1 tablet by mouth 2 times daily 60 tablet 3    torsemide (DEMADEX) 20 MG tablet Take 2 tablets by mouth daily (Patient taking differently: Take 20 mg by mouth daily m-w-f 1 TABLET TWICE DAILY AND Tue, Thur sat and Sunday once a day) 60 tablet 3    carvedilol (COREG) 12.5 MG tablet Take 1 tablet by mouth 2 times daily (with meals) 60 tablet 3    insulin glargine (LANTUS) 100 UNIT/ML injection vial Inject 40 Units into the skin nightly 1 vial 3    Insulin Aspart (NOVOLOG FLEXPEN SC) Inject 15 Units into the skin Daily with supper       vitamin B-12 (CYANOCOBALAMIN) 1000 MCG tablet Take 1,000 mcg by mouth daily      atorvastatin (LIPITOR) 80 MG tablet Take 1 tablet by mouth daily (Patient taking differently: Take 80 mg by mouth daily Takes  At hs) 90 tablet 1    aspirin 81 MG tablet Take 81 mg by mouth daily.  losartan (COZAAR) 50 MG tablet Take 1 tablet by mouth daily (Patient not taking: Reported on 8/22/2021) 30 tablet 3       Allergies:  Patient has no known allergies.     Social History:    Social History     Socioeconomic History    Marital status:      Spouse name: Not on file    Number of children: Not on file    Years of education: Not on file    Highest education level: Not on file   Occupational History    Not on file   Tobacco Use    Smoking status: Never Smoker    Smokeless tobacco: Never Used   Substance and Sexual Activity    Alcohol use: No    Drug use: No    Sexual activity: Never   Other Topics Concern    Not on file   Social History Narrative    Not on file     Social Determinants of Health     Financial Resource Strain: Low Risk     Difficulty of Paying Living Expenses: Not hard at all   Food Insecurity: No Food Insecurity    Worried About Running Out of Food in the Last Year: Never true    [address] in the Last Year: Never true   Transportation Needs:     Lack of Transportation (Medical):  Lack of Transportation (Non-Medical):    Physical Activity:     Days of Exercise per Week:     Minutes of Exercise per Session:    Stress:     Feeling of Stress :    Social Connections:     Frequency of Communication with Friends and Family:     Frequency of Social Gatherings with Friends and Family:     Attends Congregation Services:     Active Member of Clubs or Organizations:     Attends Club or Organization Meetings:     Marital Status:    Intimate Partner Violence:     Fear of Current or Ex-Partner:     Emotionally Abused:     Physically Abused:     Sexually Abused:        Family History:   History reviewed. No pertinent family history. Review of Systems:   Review of Systems   Constitutional: Negative for activity change. HENT: Negative for congestion. Respiratory: Positive for shortness of breath. Cardiovascular: Positive for leg swelling. Gastrointestinal: Negative for abdominal distention. Endocrine: Negative for cold intolerance. Genitourinary: Negative for difficulty urinating. Musculoskeletal: Negative for arthralgias. Allergic/Immunologic: Negative for environmental allergies. Hematological: Negative for adenopathy. Psychiatric/Behavioral: Positive for confusion. Physical exam:   Constitutional:  VITALS:  BP (!) 151/84   Pulse 66   Temp 97.7 °F (36.5 °C) (Bladder)   Resp 19   Ht 5' 10\" (1.778 m)   Wt 211 lb 3.2 oz (95.8 kg)   SpO2 99%   BMI 30.30 kg/m²   Gen: Lying in bed. On BiPAP.   Very hard of hearing  Skin: no rash, turgor wnl  Heent:  eomi, mmm  Neck: no bruits or jvd noted, thyroid normal  Lungs: Coarse breath sounds  Heart:  Heart sounds are normal.  Regular rate and rhythm without murmur, gallop or rub. Abdomen:  +bs, soft, nt, nd, no hepatosplenomegaly   Extremities: 2+ edema  Psychiatric: mood and affect appropriate; judgement and insight intact  Musculoskeletal:  Rom, muscular strength intact; digits, nails normal    Data/  CBC:   Lab Results   Component Value Date    WBC 7.8 08/23/2021    RBC 3.23 08/23/2021    RBC 4.92 03/01/2012    HGB 10.1 08/23/2021    HCT 29.9 08/23/2021    MCV 92.6 08/23/2021    MCH 28.8 08/23/2021    MCHC 31.1 08/23/2021    RDW 16.2 08/23/2021     08/23/2021    MPV 9.4 10/16/2015     BMP:    Lab Results   Component Value Date     08/23/2021    K 6.1 08/23/2021     08/23/2021    CO2 27 08/23/2021    BUN 50 08/23/2021    LABALBU 3.1 08/23/2021    LABALBU 4.3 03/01/2012    CREATININE 2.9 08/23/2021    CREATININE 2.47 08/23/2021    CALCIUM 9.1 08/23/2021    GFRAA 25 08/23/2021    LABGLOM 21 08/23/2021    GLUCOSE 187 08/23/2021    GLUCOSE 55 12/18/2019     XR FEMUR RIGHT (MIN 2 VIEWS)    Result Date: 8/22/2021  EXAMINATION: XR FEMUR RIGHT (MIN 2 VIEWS) DATE AND TIME:8/21/2021 10:39 PM CLINICAL HISTORY: Acute right femur pain  PAIN  COMPARISON:None  FINDINGS:Views of the right femur reveal the bones to be intact and, free of fracture or dislocation. There are moderate degenerative changes noted. IMPRESSION: No acute osseous abnormality of the right femur    CT HEAD WO CONTRAST    Result Date: 8/23/2021  EXAMINATION: CT HEAD WO CONTRAST CLINICAL HISTORY: resp failure COMPARISON:  NONE AVAILABLE An unenhanced scan is performed. FINDINGS:   There is no bleed, mass effect, or space occupying lesion. No extra-axial mass or fluid collections. Global appropriate senescent changes of aging noted. No acute process in the brain. Opacification of the mastoid air cells consistent with chronic mastoiditis. NO ACUTE PROCESS IN THE BRAIN.  CHRONIC BILATERAL MASTOIDITIS All CT scans at this facility use dose modulation, iterative reconstruction, and/or weight based dosing when appropriate to reduce radiation dose to as low as reasonably achievable. XR CHEST PORTABLE    Result Date: 8/22/2021  EXAMINATION: CHEST PORTABLE VIEW  CLINICAL HISTORY: Respiratory failure. COMPARISONS: April 20, 2021  FINDINGS: 2 views of the chest is submitted. The cardiac silhouette is enlarged Pulmonary vascular appears congested. Right sided trachea. Areas of atelectasis, patchy infiltrate in the bases left greater than right. No Pneumothoraces. PULMONARY VASCULAR IS CONGESTED WHICH COULD SUGGEST COMPONENT OF CHF SUPERIMPOSED UPON COPD. AREAS OF ATELECTASIS, PATCHY INFILTRATES IN THE BASES LEFT GREATER THAN RIGHT    US DUP LOWER EXTREMITY RIGHT DANICA    Result Date: 8/22/2021  US DUP LOWER EXTREMITY RIGHT VEIN CLINICAL HISTORY:  LEG/THIGH PAIN COMPARISONS: FINDINGS: Grayscale as well as Duplex color and Spectra; Doppler ultrasound of the deep venous system of the right lower extremity from the inguinal ligament to the popliteal fossa was performed. There are no findings of deep venous thrombus in the visualized vessels of the right lower extremity. NO FINDINGS OF DEEP VENOUS THROMBUS IN  THE VISUALIZED VESSELS OF THE RIGHT LOWER EXTREMITY. Assessment/  66-year-old man with CKD stage IV with risk factors of diabetes mellitus hypertension coronary artery disease. Never had to see nephrology before. Admitted with hyperkalemia potassium of 6.8 some worsening of his underlying chronic kidney disease, significant fluid overload as well as hypercarbic respiratory failure.   He has significant fluid overload    Plan/  1-check renal ultrasound  2-Lasix drip to help with hyperkalemia as well as fluid overload  3-serial labs every 8 hours for now  4-discussed with his son Reji Brownlee  5-we will need outpatient follow-up on discharge  6-I do not think he will need dialysis on this admission but things can certainly change    Thank you for the consultation. Please do not hesitate to call with questions.     Alexis Zaragoza MD

## 2021-08-23 NOTE — PROGRESS NOTES
NEURO CONSULT CALLED TO DR KEENE VIA ANSWERING SERVICE Electronically signed by Horacio Chau on 8/23/2021 at 10:20 AM

## 2021-08-23 NOTE — CARE COORDINATION
[facility] EMERGENCY [facility] [place] Case Management Initial Discharge Assessment    Met with Patient to discuss discharge plan. PCP: Missy Chiang PA-C                                Date of Last Visit: 8/12/21    Discharge Planning    Living Arrangements: independently at home    Who do you live with? SPOUSE    Who helps you with your care:  self or spouse    If lives at home:     Do you have any barriers navigating in your home? no    Patient can perform ADL? Yes    Current Services (outpatient and in home) :  None    Dialysis: No    Is transportation available to get to your appointments? Yes-WIFE [employer]ARIDA    DME Equipment:  yes - WALKER    Respiratory equipment: None    Respiratory provider:  no     Pharmacy:  yes - [facility] with Medication Assistance Program?  No      Patient agreeable to [facility]? YES, [facility]  Patient agreeable to SNF/Rehab? POSSIBLE [facility]ENIA  Other discharge needs identified? UNSURE OF NEEDS AT THIS TIME    Does Patient Have a High-Risk for Readmission Diagnosis (CHF, PN, MI, COPD)? Yes    If Yes,     Consult with pulmonologist? Yes   Consult with cardiologist? No   Cardiac Rehab referral if EF <35%? N/A   Consult with Pharmacy for medication assessment prior to discharge? N/A   Consult with Behavioral health to aid in depression, anxiety, or coping issues? No   Palliative Care Consult? No   Pulmonary Rehab order for COPD, PN, and CHF (if EF > 35%)? N/A    Does patient have a reliable scale and know how to read it (for CHF)? N/A   Nutrition consult for CHF? N/A   Respiratory therapy consult that includes bedside instruction on administration of nebulizers and/or inhalers, and assessment of oxygen and equipment needs in the home? Yes    Initial Discharge Plan? (Note: please see concurrent daily documentation for any updates after initial note). MET WITH PATIENT AND DTRROBERT. PT IS APPROPRIATE WITH SIMPLE QUESTIONS.   PER DTR PT WAS FROM HOME INDEPENDENT WITH HIS WALKER, NO O2 PRIOR TO ER.  PT CURRENTLY ON BIPAP. WILL AWAIT PT/OT RECOMMENDATIONS. RESP. THERAPY TO NOTIFY REN OF POSSIBLE NEED FOR TRILOGY AT HOME.       Readmission Risk              Risk of Unplanned Readmission:  17         Electronically signed by Harshil Magallon RN on 8/23/2021 at 6988 AM

## 2021-08-23 NOTE — PROGRESS NOTES
0700am report from Nilda Iraheta. Drowsy. VSS MP_SR. Mackay to CD. 02 4l nc  0800am Placed on BIPAP 18/8 40%  08:30am Ariella WILSON in to see patient and updated on status  46am Daughter at bedside and updated on status  10:10am Dr Marco Dennis in for consult and updated on status. Spoke with son ledy over phone  12 noon Dr Michelle Mcdonald in and updated on status  1300pm Up in chair with lift. Dr Sams  in and updated on status. 02 4L/nc. Lasix gtt infusing without issues  18:30pm Assisted back to bed with lift. Ultrasound here for carotids  19:15pm To CT Scan via bed  19:35pm Returned from CT Scan via bed.  No complaints

## 2021-08-23 NOTE — PROGRESS NOTES
Department of Internal Medicine  General Internal Medicine  Attending Progress Note      SUBJECTIVE:  Pt seen and examined. Awake and alert. Weaned to nasal cannula. Confused at baseline per nursing. Possible transfer to the floor later today. BIPAP as needed.  On Lasix drip    OBJECTIVE      Medications    Current Facility-Administered Medications: sodium zirconium cyclosilicate (LOKELMA) oral suspension 10 g, 10 g, Oral, TID  furosemide (LASIX) 100 mg in dextrose 5 % 100 mL infusion, 10 mg/hr, Intravenous, Continuous  sodium chloride flush 0.9 % injection 5-40 mL, 5-40 mL, Intravenous, 2 times per day  sodium chloride flush 0.9 % injection 5-40 mL, 5-40 mL, Intravenous, PRN  0.9 % sodium chloride infusion, 25 mL, Intravenous, PRN  enoxaparin (LOVENOX) injection 30 mg, 30 mg, Subcutaneous, Daily  ondansetron (ZOFRAN-ODT) disintegrating tablet 4 mg, 4 mg, Oral, Q8H PRN **OR** ondansetron (ZOFRAN) injection 4 mg, 4 mg, Intravenous, Q6H PRN  acetaminophen (TYLENOL) tablet 650 mg, 650 mg, Oral, Q6H PRN **OR** acetaminophen (TYLENOL) suppository 650 mg, 650 mg, Rectal, Q6H PRN  insulin lispro (HUMALOG) injection vial 0-12 Units, 0-12 Units, Subcutaneous, TID WC  insulin lispro (HUMALOG) injection vial 0-6 Units, 0-6 Units, Subcutaneous, Nightly  glucose (GLUTOSE) 40 % oral gel 15 g, 15 g, Oral, PRN  dextrose 50 % IV solution, 12.5 g, Intravenous, PRN  glucagon (rDNA) injection 1 mg, 1 mg, Intramuscular, PRN  dextrose 5 % solution, 100 mL/hr, Intravenous, PRN  insulin glargine (LANTUS) injection vial 40 Units, 40 Units, Subcutaneous, Nightly  calcium gluconate 1,000 mg in dextrose 5 % 100 mL IVPB, 1,000 mg, Intravenous, Once  piperacillin-tazobactam (ZOSYN) 3,375 mg in dextrose 5 % 50 mL IVPB extended infusion (mini-bag), 3,375 mg, Intravenous, Q8H  naloxone (NARCAN) injection 0.4 mg, 0.4 mg, Intravenous, PRN  dextrose 50 % IV solution, 25 g, Intravenous, PRN  Physical    VITALS:  BP (!) 142/49   Pulse 95   Temp 98.6 °F (37 °C) (Bladder)   Resp 27   Ht 5' 10\" (1.778 m)   Wt 211 lb 3.2 oz (95.8 kg)   SpO2 90%   BMI 30.30 kg/m²   Constitutional: Awake and alert in no acute distress. Lying in bed comfortably  Head: Normocephalic, atraumatic  Eyes: EOMI, PERRLA  ENT: moist mucous membranes  Neck: neck supple, trachea midline  Lungs: Good inspiratory effort, no wheeze, no rhonchi, no rales  Heart: RRR, normal S1 and S2  GI: Soft, non-distended, non tender, no guarding, no rebound, +BS  MSK: trace edema noted  Skin: warm, dry, bilateral heel wounds and open wound on left shin with oozing     Data    CBC:   Lab Results   Component Value Date    WBC 7.8 08/23/2021    RBC 3.23 08/23/2021    RBC 4.92 03/01/2012    HGB 10.0 08/23/2021    HCT 29.9 08/23/2021    MCV 92.6 08/23/2021    MCH 28.8 08/23/2021    MCHC 31.1 08/23/2021    RDW 16.2 08/23/2021     08/23/2021    MPV 9.4 10/16/2015     CMP:    Lab Results   Component Value Date     08/23/2021    K 5.9 08/23/2021    K 6.1 08/23/2021     08/23/2021    CO2 28 08/23/2021    BUN 49 08/23/2021    CREATININE 2.33 08/23/2021    CREATININE 2.4 08/23/2021    GFRAA 32.0 08/23/2021    LABGLOM 26.5 08/23/2021    GLUCOSE 184 08/23/2021    GLUCOSE 55 12/18/2019    PROT 5.7 08/23/2021    LABALBU 3.1 08/23/2021    LABALBU 4.3 03/01/2012    CALCIUM 9.0 08/23/2021    BILITOT 0.5 08/23/2021    ALKPHOS 160 08/23/2021    AST 77 08/23/2021    ALT 44 08/23/2021       ASSESSMENT AND PLAN      # Acute hypoxic and hypercapnic respiratory failure with acute metabolic encephalopathy  - initially placed on BIPAP and transferred to the ICU on 8/22  - ABG improved on BIPAP. Encephalopathy improved  - intensivist consulted  - now on nasal cannula. 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  Monitor UOP  - monitor electrolytes closely    # PAF  - cont home meds    # IDDM  - ISS, lantus    DVT: lovenox    Disposition: Pt much improved today. On nasal cannula and lasix drip. Monitoring renal function and potassium closely. Possible transfer to the floor later today if ok from pulm stand.       Whitney Calloway, DO  Internal Medicine

## 2021-08-23 NOTE — PROGRESS NOTES
Pulmonary & Critical Care Medicine ICU Progress Note  Chief complaint :  Acute respiratory failure     Subjunctive/24 hour events :   Patient seen and examined during multidisciplinary rounds with RN, charge nurse, RT, pharmacy, dietitian, and social service. Patient currently wearing the bipap 40%, sats are 100%. Patient will open eyes and responds to pain but no verbal response. Patient did not follow any commands for me. No fever overnight, blood pressure is elevated 167/45. Last BM 8/22/21, urine output 1700 for 24 hours patient is negative 1.6 liters. Social History     Tobacco Use    Smoking status: Never Smoker    Smokeless tobacco: Never Used   Substance Use Topics    Alcohol use: No     History reviewed. No pertinent family history. Recent Labs     08/22/21  2103 08/23/21  0809   PHART 7.307* 7.274*   NMA1OCD 62* 72*   PO2ART 123* 96*       MV Settings:    Rate Set: 25 bmp/ / /FiO2 : 40 %           IV:   sodium chloride      dextrose         Vitals:  BP (!) 167/45   Pulse 73   Temp 97.9 °F (36.6 °C)   Resp 22   Ht 5' 10\" (1.778 m)   Wt 212 lb 15.4 oz (96.6 kg)   SpO2 98%   BMI 30.56 kg/m²    Tmax:       Intake/Output Summary (Last 24 hours) at 8/23/2021 4032  Last data filed at 8/23/2021 0542  Gross per 24 hour   Intake 60 ml   Output 1700 ml   Net -1640 ml       EXAM:    General: Lethargic, not following commands   Head: normocephalic, atraumatic  Eyes:No gross abnormalities. ENT:  MMM no lesions  Neck:  supple and no masses  Chest : Fair air movement, rales in the bases no wheezing   Heart[de-identified] Heart sounds are normal.  Regular rate and rhythm without murmur, gallop or rub. ABD:  bowel sounds normal, soft, non-tender  Musculoskeletal : no cyanosis, no clubbing and no edema  Neuro:  Lethargic opens eyes to name and responds to pain   Skin: No rashes or nodules noted.   Lymph node:  no cervical nodes  Urology: Yes Mackay   Psychiatric: lethargic     Medications:  Scheduled Meds:   sodium polystyrene  30 g Rectal Once    sodium chloride flush  5-40 mL Intravenous 2 times per day    enoxaparin  30 mg Subcutaneous Daily    insulin lispro  0-12 Units Subcutaneous TID WC    insulin lispro  0-6 Units Subcutaneous Nightly    insulin glargine  40 Units Subcutaneous Nightly    calcium gluconate IVPB  1,000 mg Intravenous Once    piperacillin-tazobactam  3,375 mg Intravenous Q8H       PRN Meds:  sodium chloride flush, sodium chloride, ondansetron **OR** ondansetron, acetaminophen **OR** acetaminophen, glucose, dextrose, glucagon (rDNA), dextrose, naloxone, dextrose    Results: reviewed by me   CBC:   Recent Labs     08/21/21 2145 08/22/21  0432 08/22/21 2103 08/23/21  0322 08/23/21  0809   WBC 11.0*  --   --  7.8  --    HGB 9.6*   < > 9.8* 9.3* 10.1*   HCT 29.8*  --   --  29.9*  --    MCV 91.0  --   --  92.6  --      --   --  143  --     < > = values in this interval not displayed. BMP:   Recent Labs     08/22/21  1413 08/22/21  1629 08/22/21  2018 08/22/21 2103 08/23/21  0322 08/23/21  0809     --  140  --  143  --    K 6.4*  6.5*  --  6.0*  --  6.1*  --      --  104  --  108*  --    CO2 28  --  29  --  27  --    BUN 51*  --  51*  --  50*  --    CREATININE 2.69*   < > 2.57* 2.8* 2.47* 2.9*    < > = values in this interval not displayed. LIVER PROFILE:   Recent Labs     08/21/21 2145 08/23/21  0322   AST 36 77*   ALT 44* 44*   BILITOT 0.4 0.5   ALKPHOS 195* 160*     PT/INR: No results for input(s): PROTIME, INR in the last 72 hours. APTT: No results for input(s): APTT in the last 72 hours.   UA:  Recent Labs     08/22/21  1802   COLORU RED*   PHUR 5.0   WBCUA 6-9*   RBCUA >100*   BACTERIA Negative   CLARITYU CLOUDY*   SPECGRAV 1.015   LEUKOCYTESUR SMALL*   UROBILINOGEN 0.2   BILIRUBINUR Negative   BLOODU LARGE*   GLUCOSEU 250*       Cultures:    XR FEMUR RIGHT (MIN 2 VIEWS)    Result Date: 8/22/2021  EXAMINATION: XR FEMUR RIGHT (MIN 2 VIEWS) DATE AND TIME:8/21/2021 10:39 PM CLINICAL HISTORY: Acute right femur pain  PAIN  COMPARISON:None  FINDINGS:Views of the right femur reveal the bones to be intact and, free of fracture or dislocation. There are moderate degenerative changes noted. IMPRESSION: No acute osseous abnormality of the right femur    CT HEAD WO CONTRAST    Result Date: 8/23/2021  EXAMINATION: CT HEAD WO CONTRAST CLINICAL HISTORY: resp failure COMPARISON:  NONE AVAILABLE An unenhanced scan is performed. FINDINGS:   There is no bleed, mass effect, or space occupying lesion. No extra-axial mass or fluid collections. Global appropriate senescent changes of aging noted. No acute process in the brain. Opacification of the mastoid air cells consistent with chronic mastoiditis. NO ACUTE PROCESS IN THE BRAIN. CHRONIC BILATERAL MASTOIDITIS All CT scans at this facility use dose modulation, iterative reconstruction, and/or weight based dosing when appropriate to reduce radiation dose to as low as reasonably achievable. XR CHEST PORTABLE    Result Date: 8/22/2021  EXAMINATION: CHEST PORTABLE VIEW  CLINICAL HISTORY: Respiratory failure. COMPARISONS: April 20, 2021  FINDINGS: 2 views of the chest is submitted. The cardiac silhouette is enlarged Pulmonary vascular appears congested. Right sided trachea. Areas of atelectasis, patchy infiltrate in the bases left greater than right. No Pneumothoraces. PULMONARY VASCULAR IS CONGESTED WHICH COULD SUGGEST COMPONENT OF CHF SUPERIMPOSED UPON COPD. AREAS OF ATELECTASIS, PATCHY INFILTRATES IN THE BASES LEFT GREATER THAN RIGHT    US DUP LOWER EXTREMITY RIGHT DANICA    Result Date: 8/22/2021  US DUP LOWER EXTREMITY RIGHT VEIN CLINICAL HISTORY:  LEG/THIGH PAIN COMPARISONS: FINDINGS: Grayscale as well as Duplex color and Spectra;  Doppler ultrasound of the deep venous system of the right lower extremity from the inguinal ligament to the popliteal fossa was performed. There are no findings of deep venous thrombus in the visualized vessels of the right lower extremity. NO FINDINGS OF DEEP VENOUS THROMBUS IN  THE VISUALIZED VESSELS OF THE RIGHT LOWER EXTREMITY. Most recent    Chest CT      WITH CONTRAST:No results found for this or any previous visit. WITHOUT CONTRAST: No results found for this or any previous visit. CXR      2-view: Results for orders placed during the hospital encounter of 04/20/21    XR CHEST (2 VW)    Narrative  EXAMINATION: Chest x-ray, 2 view    HISTORY: Shortness of breath    TECHNIQUE: Frontal and lateral views of the chest    COMPARISON: Portable chest radiograph from October 22, 2019    FINDINGS:    Atherosclerotic calcification of the thoracic aorta. Cardiomediastinal silhouette is within normal limits. No pneumothorax, pleural effusion, or consolidation. The lungs are hyperinflated and there is coarsening of the pulmonary interstitium. No acute  osseous abnormality. Impression  No radiographic evidence of acute intrathoracic process. Findings suggesting COPD. Results for orders placed during the hospital encounter of 03/28/19    XR CHEST STANDARD (2 VW)    Narrative  X-RAY: A CHEST, 2 VIEW(S):    CLINICAL HISTORY:  R06.02 SOB (shortness of breath) ICD10 , cough for past 3 months. DATE: 3/28/2019 1:06 PM    COMPARISONS: 9/27/2018    FINDINGS:  Normal cardiac silhouette. There are atherosclerotic calcifications in the aortic arch. Lungs are clear without consolidation. No pleural effusion or pneumothorax. There are moderate degenerative changes in spine. Impression  NO ACUTE CARDIOPULMONARY ABNORMALITY. NO CHANGE. Portable: Results for orders placed during the hospital encounter of 08/21/21    XR CHEST PORTABLE    Narrative  EXAMINATION: CHEST PORTABLE VIEW    CLINICAL HISTORY: Respiratory failure. COMPARISONS: April 20, 2021    FINDINGS:    2 views of the chest is submitted.   The cardiac silhouette is enlarged  Pulmonary vascular appears congested. Right sided trachea. Areas of atelectasis, patchy infiltrate in the bases left greater than right. No Pneumothoraces. Impression  PULMONARY VASCULAR IS CONGESTED WHICH COULD SUGGEST COMPONENT OF CHF SUPERIMPOSED UPON COPD. AREAS OF ATELECTASIS, PATCHY INFILTRATES IN THE BASES LEFT GREATER THAN RIGHT      Echo No results found for this or any previous visit. Assessment:   This is a critically ill patient at risk of deterioration / death , needing close ICU monitoring and intervention due to below noted problems   · Acute encephalpathy  · Acute hypercapnic respiratory failure   · Hyperkalemia   · Acute kidney injury   · Hypertension     Recommendations   · Continue Bipap  · Repeat ABG if not improving will intubate   PUD prophylaxis  DVT prophylaxis  · Kayexalate rectal times one   · Monitor electrolytes   · Appreciate nephrology   · Monitor blood sugar 140-180  · Cultures pending   · Empiric antibiotics               Electronically signed by TEJAS Diego CNP,  FCCP ,on 8/23/2021 at 8:32 AM

## 2021-08-23 NOTE — CONSULTS
Subjective:      Patient ID: Renata Neumann is a 80 y.o. male who presents today for encephalopathy. Dr Jesus Bethea  HPI 40-year-old right-handed gentleman who was initially admitted to the hospital with hyperkalemia same week and encephalopathic and was brought to the ICU. As considered he had initially left-sided weakness. When I am examining him he actually appears to better the most of this appears to be his hearing issues patient has history of atrial fibrillation but he is Eliquis is on hold and large vessel evaluation needs to be done. Is very hard of hearing but is nonfocal otherwise    Review of Systems   Constitutional: Negative for fever. HENT: Negative for ear pain, tinnitus and trouble swallowing. Eyes: Negative for photophobia and visual disturbance. Respiratory: Negative for choking and shortness of breath. Cardiovascular: Negative for chest pain and palpitations. Gastrointestinal: Negative for nausea and vomiting. Musculoskeletal: Negative for back pain, gait problem, joint swelling, myalgias, neck pain and neck stiffness. Skin: Negative for color change. Allergic/Immunologic: Negative for food allergies. Neurological: Negative for dizziness, tremors, seizures, syncope, facial asymmetry, speech difficulty, weakness, light-headedness, numbness and headaches. Psychiatric/Behavioral: Negative for behavioral problems, confusion, hallucinations and sleep disturbance.        Past Medical History:   Diagnosis Date    Anemia     Atrial fibrillation ([address]     CAD (coronary artery disease)     Hyperlipidemia     Hypertension     Type II or unspecified type diabetes mellitus without mention of complication, not stated as uncontrolled      Past Surgical History:   Procedure Laterality Date    CATARACT REMOVAL WITH IMPLANT Right     2013 - cathy walters    FOOT FRACTURE SURGERY  1980s    work related - fell off a ladder    PTCA  2008    4 stents - jill walters    ROTATOR CUFF Intravenous Continuous Debbie Byrd MD        sodium chloride flush 0.9 % injection 5-40 mL  5-40 mL Intravenous 2 times per day Anirudh School, APRN - CNP   10 mL at 08/22/21 2313    sodium chloride flush 0.9 % injection 5-40 mL  5-40 mL Intravenous PRN Anirudh School, APRN - CNP        0.9 % sodium chloride infusion  25 mL Intravenous PRN Anirudh School, APRN - CNP        enoxaparin (LOVENOX) injection 30 mg  30 mg Subcutaneous Daily Anirudh School, APRN - CNP   30 mg at 08/23/21 4227    ondansetron (ZOFRAN-ODT) disintegrating tablet 4 mg  4 mg Oral Q8H PRN Anirudh School, APRN - CNP        Or    ondansetron [facility]) injection 4 mg  4 mg Intravenous Q6H PRN Anirudh School, APRN - CNP        acetaminophen (TYLENOL) tablet 650 mg  650 mg Oral Q6H PRN Anirudh School, APRN - CNP        Or    acetaminophen (TYLENOL) suppository 650 mg  650 mg Rectal Q6H PRN Anirudh School, APRN - CNP        insulin lispro (HUMALOG) injection vial 0-12 Units  0-12 Units Subcutaneous TID WC Anirudh School, APRN - CNP   2 Units at 08/23/21 3639    insulin lispro (HUMALOG) injection vial 0-6 Units  0-6 Units Subcutaneous Nightly Anirudh School, APRN - CNP   2 Units at 08/22/21 2255    glucose (GLUTOSE) 40 % oral gel 15 g  15 g Oral PRN Anirudh School, APRN - CNP        dextrose 50 % IV solution  12.5 g Intravenous PRN Anirudh School, APRN - CNP        glucagon (rDNA) injection 1 mg  1 mg Intramuscular PRN Anirudh School, APRN - CNP        dextrose 5 % solution  100 mL/hr Intravenous PRN Anirudh School, APRN - CNP        insulin glargine (LANTUS) injection vial 40 Units  40 Units Subcutaneous Nightly Anirudh School, APRN - CNP   40 Units at 08/22/21 2256    calcium gluconate 1,000 mg in dextrose 5 % 100 mL IVPB  1,000 mg Intravenous Once Cora Verma DO        piperacillin-tazobactam (ZOSYN) 3,375 mg in dextrose 5 % 50 mL IVPB extended infusion (mini-bag)  3,375 mg Intravenous Q8H Park Weinstein MD 12.5 mL/hr at 08/23/21 0904 3,375 mg at 08/23/21 0904    naloxone (NARCAN) injection 0.4 mg  0.4 mg Intravenous PRN Park Weinstein MD        dextrose 50 % IV solution  25 g Intravenous PRN Park Weinstein MD   25 g at 08/22/21 1414        Objective:   BP (!) 151/84   Pulse 66   Temp 97.7 °F (36.5 °C) (Bladder)   Resp 19   Ht 5' 10\" (1.778 m)   Wt 211 lb 3.2 oz (95.8 kg)   SpO2 99%   BMI 30.30 kg/m²     Physical Exam  Vitals reviewed. Eyes:      Pupils: Pupils are equal, round, and reactive to light. Cardiovascular:      Rate and Rhythm: Normal rate and regular rhythm. Heart sounds: No murmur heard. Pulmonary:      Effort: Pulmonary effort is normal.      Breath sounds: Normal breath sounds. Abdominal:      General: Bowel sounds are normal.   Musculoskeletal:         General: Normal range of motion. Cervical back: Normal range of motion. Skin:     General: Skin is warm. Neurological:      Mental Status: He is alert and oriented to person, place, and time. Cranial Nerves: No cranial nerve deficit. Sensory: No sensory deficit. Motor: No abnormal muscle tone. Coordination: Coordination normal.      Deep Tendon Reflexes: Reflexes are normal and symmetric. Babinski sign absent on the right side. Babinski sign absent on the left side. Comments: he is very hard of hearing and therefore of the examination of history suboptimal though he is moving all his extremities good strength all strength is 4/ 5 is areflexic throughout. Would did not attempt to walk him.    Psychiatric:         Mood and Affect: Mood normal.         XR FEMUR RIGHT (MIN 2 VIEWS)    Result Date: 8/22/2021  EXAMINATION: XR FEMUR RIGHT (MIN 2 VIEWS) DATE AND TIME:8/21/2021 10:39 PM CLINICAL HISTORY: Acute right femur pain  PAIN  COMPARISON:None  FINDINGS:Views of the right femur reveal the bones to be intact and, free of fracture or dislocation. There are moderate degenerative changes noted. IMPRESSION: No acute osseous abnormality of the right femur    CT HEAD WO CONTRAST    Result Date: 8/23/2021  EXAMINATION: CT HEAD WO CONTRAST CLINICAL HISTORY: resp failure COMPARISON:  NONE AVAILABLE An unenhanced scan is performed. FINDINGS:   There is no bleed, mass effect, or space occupying lesion. No extra-axial mass or fluid collections. Global appropriate senescent changes of aging noted. No acute process in the brain. Opacification of the mastoid air cells consistent with chronic mastoiditis. NO ACUTE PROCESS IN THE BRAIN. CHRONIC BILATERAL MASTOIDITIS All CT scans at this facility use dose modulation, iterative reconstruction, and/or weight based dosing when appropriate to reduce radiation dose to as low as reasonably achievable. XR CHEST PORTABLE    Result Date: 8/22/2021  EXAMINATION: CHEST PORTABLE VIEW  CLINICAL HISTORY: Respiratory failure. COMPARISONS: April 20, 2021  FINDINGS: 2 views of the chest is submitted. The cardiac silhouette is enlarged Pulmonary vascular appears congested. Right sided trachea. Areas of atelectasis, patchy infiltrate in the bases left greater than right. No Pneumothoraces. PULMONARY VASCULAR IS CONGESTED WHICH COULD SUGGEST COMPONENT OF CHF SUPERIMPOSED UPON COPD. AREAS OF ATELECTASIS, PATCHY INFILTRATES IN THE BASES LEFT GREATER THAN RIGHT    US DUP LOWER EXTREMITY RIGHT DANICA    Result Date: 8/22/2021  US DUP LOWER EXTREMITY RIGHT VEIN CLINICAL HISTORY:  LEG/THIGH PAIN COMPARISONS: FINDINGS: Grayscale as well as Duplex color and Spectra; Doppler ultrasound of the deep venous system of the right lower extremity from the inguinal ligament to the popliteal fossa was performed.  There are no findings of deep venous thrombus in the visualized vessels of the right lower extremity. NO FINDINGS OF DEEP VENOUS THROMBUS IN  THE VISUALIZED VESSELS OF THE RIGHT LOWER EXTREMITY. Lab Results   Component Value Date    WBC 7.8 08/23/2021    RBC 3.23 08/23/2021    RBC 4.92 03/01/2012    HGB 10.0 08/23/2021    HCT 29.9 08/23/2021    MCV 92.6 08/23/2021    MCH 28.8 08/23/2021    MCHC 31.1 08/23/2021    RDW 16.2 08/23/2021     08/23/2021    MPV 9.4 10/16/2015     Lab Results   Component Value Date     08/23/2021    K 6.1 08/23/2021    K 6.1 08/23/2021     08/23/2021    CO2 27 08/23/2021    BUN 50 08/23/2021    CREATININE 2.4 08/23/2021    CREATININE 2.47 08/23/2021    GFRAA 31 08/23/2021    LABGLOM 26 08/23/2021    GLUCOSE 187 08/23/2021    GLUCOSE 55 12/18/2019    PROT 5.7 08/23/2021    LABALBU 3.1 08/23/2021    LABALBU 4.3 03/01/2012    CALCIUM 9.1 08/23/2021    BILITOT 0.5 08/23/2021    ALKPHOS 160 08/23/2021    AST 77 08/23/2021    ALT 44 08/23/2021     Lab Results   Component Value Date    PROTIME 10.2 09/27/2018    INR 1.0 09/27/2018     Lab Results   Component Value Date    XNOJHOQC55 1683 10/29/2019    FOLATE 17.8 10/29/2019    FERRITIN 22.3 11/09/2019    IRON 57 02/24/2020    TIBC 303 02/24/2020     Lab Results   Component Value Date    TRIG 123 08/06/2019    HDL 32 08/06/2019    HDL 41 01/13/2011    LDLCALC 50 08/06/2019     No results found for: LABAMPH, BARBSCNU, LABBENZ, CANNAB, COCAINESCRN, LABMETH, OPIATESCREENURINE, PHENCYCLIDINESCREENURINE, PPXUR, ETOH  No results found for: LITHIUM, DILFRTOT, VALPROATE    Assessment:   Cerebral TIA with multiple risk factors for cerebrovascular disease with atrial fibrillation patient is on Eliquis which may be resumed or this may be changed to Xarelto. Will arrange for carotid ultrasound repeat CT scan is pending. Patient's examination may be somewhat suboptimal and if we see something that is residual a pop MRI can always be done. We will first assess his weekly shoes for now.   Critical care time of 60 minutes      Chance Mae MD, Sheri Bailon, American Board of Psychiatry & Neurology  Board Certified in Vascular Neurology  Board Certified in Neuromuscular Medicine  Certified in [facility] [address]:

## 2021-08-24 NOTE — PROGRESS NOTES
Neurology Follow up    SUBJECTIVE:  Hard of hearing but comprehends well, no focal symptoms    Current Facility-Administered Medications   Medication Dose Route Frequency Provider Last Rate Last Admin    carvedilol (COREG) tablet 6.25 mg  6.25 mg Oral BID  TEJAS Turcios - CNP   6.25 mg at 08/24/21 1806    ammonium lactate (LAC-HYDRIN) 12 % lotion   Topical BID Shelva Me, DPM   Given at 08/24/21 1223    furosemide (LASIX) 100 mg in dextrose 5 % 100 mL infusion  10 mg/hr IntraVENous Continuous Anderson Rojo MD 10 mL/hr at 08/24/21 1521 10 mg/hr at 08/24/21 1521    sodium chloride flush 0.9 % injection 5-40 mL  5-40 mL IntraVENous 2 times per day Norvel Sheets, APRN - CNP   10 mL at 08/24/21 8498    sodium chloride flush 0.9 % injection 5-40 mL  5-40 mL IntraVENous PRN Norvel Sheets, APRN - CNP        0.9 % sodium chloride infusion  25 mL IntraVENous PRN Norvel Sheets, APRN - CNP        enoxaparin (LOVENOX) injection 30 mg  30 mg Subcutaneous Daily Norvel Sheets, APRN - CNP   30 mg at 08/24/21 3066    ondansetron (ZOFRAN-ODT) disintegrating tablet 4 mg  4 mg Oral Q8H PRN Norvel Sheets, APRN - CNP        Or    ondansetron [facility]) injection 4 mg  4 mg IntraVENous Q6H PRN Norvel Sheets, APRN - CNP        acetaminophen (TYLENOL) tablet 650 mg  650 mg Oral Q6H PRN Norvel Sheets, APRN - CNP        Or    acetaminophen (TYLENOL) suppository 650 mg  650 mg Rectal Q6H PRN Norvel Sheets, APRN - CNP        insulin lispro (HUMALOG) injection vial 0-12 Units  0-12 Units Subcutaneous TID  TEJAS Caraballo - CNP   4 Units at 08/24/21 1806    insulin lispro (HUMALOG) injection vial 0-6 Units  0-6 Units Subcutaneous Nightly Norvel Sheets, APRN - CNP   3 Units at 08/23/21 1958    glucose (GLUTOSE) 40 % oral gel 15 g  15 g Oral PRN Summer Montiel APRN - CNP        dextrose 50 % IV solution Level of Alertness:   awake            Orientation:   person, place, time                      Attention/Concentration:  normal            Language:  normal      Funduscopic Exam:     Cranial Nerves        Cranial nerve II           Visual acuity:  normal           Visual fields:  normal      Cranial nerve III           Pupils:  equal, round, reactive to light      Cranial nerves III, IV, VI           Extraocular Movements: intact      Cranial nerve V           Facial sensation:  intact      Cranial nerve VII           Facial strength: intact      Cranial nerve VIII           Hearing:  intact      Cranial nerve IX           Palate:  intact      Cranial nerve XI         Shoulder shrug:  intact      Cranial nerve XII          Tongue movement:  normal    Motor:    Drift:  absent  Motor exam is symmetrical 4 out of 5 all extremities bilaterally  Tone:  normal  Abnormal Movements:  absent            Sensory:        Pinprick             Right Upper Extremity:  normal             Left Upper Extremity:  normal             Right Lower Extremity:  normal             Left Lower Extremity:  normal           Vibration                         Touch            Proprioception                 Coordination:           Finger/Nose   Right:  normal              Left:  normal          Heel-Knee-Shin                Right:  normal              Left:  normal          Rapid Alternating Movements              Right:  normal              Left:  normal          Gait:                       Casual:  deffered Reflexes:             Deep Tendon Reflexes:             Reflexes are absent             Plantar response:                Right:  downgoing               Left:  downgoing    Vascular:  Cardiac Exam:  normal         XR FEMUR RIGHT (MIN 2 VIEWS)    Result Date: 8/22/2021  EXAMINATION: XR FEMUR RIGHT (MIN 2 VIEWS) DATE AND TIME:8/21/2021 10:39 PM CLINICAL HISTORY: Acute right femur pain  PAIN  COMPARISON:None  FINDINGS:Views of the right femur reveal the bones to be intact and, free of fracture or dislocation. There are moderate degenerative changes noted. IMPRESSION: No acute osseous abnormality of the right femur    CT HEAD WO CONTRAST    Result Date: 8/23/2021  EXAMINATION: CT HEAD WO CONTRAST CLINICAL HISTORY: resp failure COMPARISON:  NONE AVAILABLE An unenhanced scan is performed. FINDINGS:   There is no bleed, mass effect, or space occupying lesion. No extra-axial mass or fluid collections. Global appropriate senescent changes of aging noted. No acute process in the brain. Opacification of the mastoid air cells consistent with chronic mastoiditis. NO ACUTE PROCESS IN THE BRAIN. CHRONIC BILATERAL MASTOIDITIS All CT scans at this facility use dose modulation, iterative reconstruction, and/or weight based dosing when appropriate to reduce radiation dose to as low as reasonably achievable. XR CHEST PORTABLE    Result Date: 8/22/2021  EXAMINATION: CHEST PORTABLE VIEW  CLINICAL HISTORY: Respiratory failure. COMPARISONS: April 20, 2021  FINDINGS: 2 views of the chest is submitted. The cardiac silhouette is enlarged Pulmonary vascular appears congested. Right sided trachea. Areas of atelectasis, patchy infiltrate in the bases left greater than right. No Pneumothoraces. PULMONARY VASCULAR IS CONGESTED WHICH COULD SUGGEST COMPONENT OF CHF SUPERIMPOSED UPON COPD. AREAS OF ATELECTASIS, PATCHY INFILTRATES IN THE BASES LEFT GREATER THAN RIGHT    US DUP LOWER EXTREMITY RIGHT DANICA    Result Date: 8/22/2021  US DUP LOWER EXTREMITY RIGHT VEIN CLINICAL HISTORY:  LEG/THIGH PAIN COMPARISONS: FINDINGS: Grayscale as well as Duplex color and Spectra; Doppler ultrasound of the deep venous system of the right lower extremity from the inguinal ligament to the popliteal fossa was performed.  There are no findings of deep venous thrombus in the visualized vessels of the right lower extremity. NO FINDINGS OF DEEP VENOUS THROMBUS IN  THE VISUALIZED VESSELS OF THE RIGHT LOWER EXTREMITY. Recent Labs     08/21/21  2145 08/22/21  0432 08/23/21  0322 08/23/21  0322 08/23/21  0809 08/23/21  1035 08/24/21  0504   WBC 11.0*  --  7.8  --   --   --  7.2   HGB 9.6*   < > 9.3*   < > 10.1* 10.0* 9.7*     --  143  --   --   --  186    < > = values in this interval not displayed. Recent Labs     08/23/21  1159 08/23/21 2013 08/24/21  0504   * 148* 144   K 5.9* 5.2* 4.5    104 103   CO2 28 32* 33*   BUN 49* 48* 48*   CREATININE 2.33* 2.29* 2.17*   GLUCOSE 184* 219* 194*     Recent Labs     08/21/21 2145 08/23/21  0322 08/24/21  0504   BILITOT 0.4 0.5 0.6   ALKPHOS 195* 160* 167*   AST 36 77* 58*   ALT 44* 44* 43*     Lab Results   Component Value Date    PROTIME 10.2 09/27/2018    INR 1.0 09/27/2018     No results found for: LITHIUM, DILFRTOT, VALPROATE    ASSESSMENT AND PLAN    Encephalopathy/ TIA, no focalsymptoms, CT x 2 negative , bilateral Carotid stenosis, will need CTA, though increased creatinine, so will do MRA. General weakness, areflexic    [facility] OLEGARIO Dunlap MD, [id number], American Board of Psychiatry & Neurology  Board Certified in Vascular Neurology  Board Certified in Neuromuscular Medicine  Certified in . [address] '

## 2021-08-24 NOTE — PROGRESS NOTES
Renal Progress Note    Assessment and Plan:      80-year-old man with CKD stage IV with risk factors of diabetes mellitus hypertension coronary artery disease. Never had to see nephrology before. Admitted with hyperkalemia potassium of 6.8 some worsening of his underlying chronic kidney disease, significant fluid overload as well as hypercarbic respiratory failure. On torsemide 40 daily and cozaar 50 mg at home. He has significant fluid overload     Plan/  1-check renal ultrasound - done but not read yet. 2-Lasix drip to help with hyperkalemia as well as fluid overload  3-d/c lokelma  4-discussed with his son Charlie Swenson yesterday  5-we will need outpatient follow-up on discharge  6-ok for floor from renal standpoint  7- plan to change diuretic to po tomorrow  8 keep off losartan for now    Patient Active Problem List:     Hyperlipidemia     Hypertension     CHF (congestive heart failure), NYHA class I, acute on chronic, combined ([address]     CKD stage 3 due to type 2 diabetes mellitus ([address]     Coronary artery disease involving native coronary artery of native heart without angina pectoris     Atrial fibrillation ([address]     Essential hypertension     DM2 (diabetes mellitus, type 2) (HCC)     Leg wound, left, sequela     Non-pressure chronic ulcer of other part of right lower leg with fat layer exposed ([address]     Chronic venous hypertension (idiopathic) with ulcer and inflammation of right lower extremity (CODE) ([facility])     Venous insufficiency of both lower extremities     Hyperkalemia     Right thigh pain     Acute on chronic respiratory failure with hypercapnia (HCC)      Subjective:   Admit Date: 8/21/2021    Interval History: very good uo.  k ok. Has been on lokelma.         Medications:   Scheduled Meds:   sodium zirconium cyclosilicate  10 g Oral TID    sodium chloride flush  5-40 mL Intravenous 2 times per day    enoxaparin  30 mg Subcutaneous Daily    insulin lispro  0-12 Units Subcutaneous TID WC    insulin lispro  0-6 Units Subcutaneous Nightly    insulin glargine  40 Units Subcutaneous Nightly    calcium gluconate IVPB  1,000 mg Intravenous Once    piperacillin-tazobactam  3,375 mg Intravenous Q8H     Continuous Infusions:   furosemide (LASIX) 1mg/ml infusion 10 mg/hr (08/24/21 0250)    sodium chloride      dextrose         CBC:   Recent Labs     08/23/21  0322 08/23/21  0809 08/23/21  1035 08/24/21  0504   WBC 7.8  --   --  7.2   HGB 9.3*   < > 10.0* 9.7*     --   --  186    < > = values in this interval not displayed. CMP:    Recent Labs     08/23/21  1159 08/23/21 2013 08/24/21  0504   * 148* 144   K 5.9* 5.2* 4.5    104 103   CO2 28 32* 33*   BUN 49* 48* 48*   CREATININE 2.33* 2.29* 2.17*   GLUCOSE 184* 219* 194*   CALCIUM 9.0 9.4 8.6   LABGLOM 26.5* 27.0* 28.7*     Troponin:   Recent Labs     08/22/21  1413   TROPONINI 0.152*     BNP: No results for input(s): BNP in the last 72 hours. INR: No results for input(s): INR in the last 72 hours. Lipids: No results for input(s): CHOL, LDLDIRECT, TRIG, HDL, AMYLASE, LIPASE in the last 72 hours. Liver:   Recent Labs     08/24/21  0504   AST 58*   ALT 43*   ALKPHOS 167*   PROT 5.6*   LABALBU 3.0*   BILITOT 0.6     Iron:  No results for input(s): IRONS, FERRITIN in the last 72 hours. Invalid input(s): LABIRONS  Urinalysis: No results for input(s): UA in the last 72 hours.     Objective:   Vitals: BP (!) 114/39   Pulse 72   Temp 99.3 °F (37.4 °C) (Bladder)   Resp 14   Ht 5' 10\" (1.778 m)   Wt 211 lb 3.2 oz (95.8 kg)   SpO2 98%   BMI 30.30 kg/m²    Wt Readings from Last 3 Encounters:   08/23/21 211 lb 3.2 oz (95.8 kg)   08/12/21 205 lb (93 kg)   06/23/20 200 lb (90.7 kg)      24HR INTAKE/OUTPUT:      Intake/Output Summary (Last 24 hours) at 8/24/2021 0844  Last data filed at 8/24/2021 0500  Gross per 24 hour   Intake 639.05 ml   Output 5600 ml   Net -4960.95 ml       Constitutional:  Alert, awake, no apparent distress   Skin:normal, no rash  HEENT:sclera anicteric.   Head atraumatic normocephalic  Neck:supple with no thyromegally  Cardiovascular:  S1, S2 without m/r/g   Respiratory:  CTA B without w/r/r   Abdomen: +bs, soft, nt  Ext: 2+ LE edema  Musculoskeletal:Intact  Neuro:Alert and oriented with no deficit      Electronically signed by Yashira Belcher MD on 8/24/2021 at 8:44 AM

## 2021-08-24 NOTE — PROGRESS NOTES
Physical Therapy Missed Treatment   Facility/Department: [facility] MED SURG IC17/IC17-01    NAME: David Simpson    : 1931 (15 y.o.)  MRN: [id number]    Account: [de-identified]  Gender: male      PT referral received. Chart reviewed. Pt has pending duplex. Will hold PT eval at this time. Attempted to contact pts nurse however she was unavailable. Will follow and attempt PT evaluation again at earliest availability.        Kenyatta Rueda, PT, 21 at 1:01 PM

## 2021-08-24 NOTE — FLOWSHEET NOTE
Patient arrived to room 169 via bed from ICU. Wife present at bedside. Patient is very hard of hearing. Oriented to room and call light. Denies any chest pain. Remains atrial fibrillation on the monitor with HR 90's. Trace pitting edema noted to bilateral arms. Pedal pulses palpable. Denies any shortness of breath at this time. Lungs are diminished bilaterally. SATS 98% on 3L NC. He is A/Ox3 but is confused at times. He is calm and cooperative and appropriate. Denies any pain with elimination. Mackay catheter to gravity drainage with dark yellow clear urine. Skin remains warm, dry and intact. Bilateral lower extremities are reddened. #20g SL to left forearm, flushed and patent. #20g SL to right forearm with Lasix gtt infusing at 10ml/hr. TV on and call light remains in reach.  Electronically signed by Bea Crockett RN on 8/24/2021 at 2:32 PM

## 2021-08-24 NOTE — PROGRESS NOTES
nodules noted. Lymph node:  no cervical nodes  Urology: Yes Mackay   Psychiatric: lethargic     Medications:  Scheduled Meds:   sodium zirconium cyclosilicate  10 g Oral TID    sodium chloride flush  5-40 mL Intravenous 2 times per day    enoxaparin  30 mg Subcutaneous Daily    insulin lispro  0-12 Units Subcutaneous TID WC    insulin lispro  0-6 Units Subcutaneous Nightly    insulin glargine  40 Units Subcutaneous Nightly    calcium gluconate IVPB  1,000 mg Intravenous Once    piperacillin-tazobactam  3,375 mg Intravenous Q8H       PRN Meds:  sodium chloride flush, sodium chloride, ondansetron **OR** ondansetron, acetaminophen **OR** acetaminophen, glucose, dextrose, glucagon (rDNA), dextrose, naloxone, dextrose    Results: reviewed by me   CBC:   Recent Labs     08/21/21 2145 08/22/21  0432 08/23/21 0322 08/23/21  0322 08/23/21  0809 08/23/21  1035 08/24/21  0504   WBC 11.0*  --  7.8  --   --   --  7.2   HGB 9.6*   < > 9.3*   < > 10.1* 10.0* 9.7*   HCT 29.8*  --  29.9*  --   --   --  30.2*   MCV 91.0  --  92.6  --   --   --  91.9     --  143  --   --   --  186    < > = values in this interval not displayed. BMP:   Recent Labs     08/23/21  1159 08/23/21 2013 08/24/21  0504   * 148* 144   K 5.9* 5.2* 4.5    104 103   CO2 28 32* 33*   BUN 49* 48* 48*   CREATININE 2.33* 2.29* 2.17*     LIVER PROFILE:   Recent Labs     08/21/21 2145 08/23/21  0322 08/24/21  0504   AST 36 77* 58*   ALT 44* 44* 43*   BILITOT 0.4 0.5 0.6   ALKPHOS 195* 160* 167*     PT/INR: No results for input(s): PROTIME, INR in the last 72 hours. APTT: No results for input(s): APTT in the last 72 hours.   UA:  Recent Labs     08/22/21  1802   COLORU RED*   PHUR 5.0   WBCUA 6-9*   RBCUA >100*   BACTERIA Negative   CLARITYU CLOUDY*   SPECGRAV 1.015   LEUKOCYTESUR SMALL*   UROBILINOGEN 0.2   BILIRUBINUR Negative   BLOODU LARGE*   GLUCOSEU 250*       Cultures:    XR FEMUR RIGHT (MIN 2 VIEWS)    Result Date: 8/22/2021  EXAMINATION: XR FEMUR RIGHT (MIN 2 VIEWS) DATE AND TIME:8/21/2021 10:39 PM CLINICAL HISTORY: Acute right femur pain  PAIN  COMPARISON:None  FINDINGS:Views of the right femur reveal the bones to be intact and, free of fracture or dislocation. There are moderate degenerative changes noted. IMPRESSION: No acute osseous abnormality of the right femur    CT HEAD WO CONTRAST    Result Date: 8/23/2021  EXAMINATION: CT HEAD WO CONTRAST CLINICAL HISTORY: resp failure COMPARISON:  NONE AVAILABLE An unenhanced scan is performed. FINDINGS:   There is no bleed, mass effect, or space occupying lesion. No extra-axial mass or fluid collections. Global appropriate senescent changes of aging noted. No acute process in the brain. Opacification of the mastoid air cells consistent with chronic mastoiditis. NO ACUTE PROCESS IN THE BRAIN. CHRONIC BILATERAL MASTOIDITIS All CT scans at this facility use dose modulation, iterative reconstruction, and/or weight based dosing when appropriate to reduce radiation dose to as low as reasonably achievable. XR CHEST PORTABLE    Result Date: 8/22/2021  EXAMINATION: CHEST PORTABLE VIEW  CLINICAL HISTORY: Respiratory failure. COMPARISONS: April 20, 2021  FINDINGS: 2 views of the chest is submitted. The cardiac silhouette is enlarged Pulmonary vascular appears congested. Right sided trachea. Areas of atelectasis, patchy infiltrate in the bases left greater than right. No Pneumothoraces. PULMONARY VASCULAR IS CONGESTED WHICH COULD SUGGEST COMPONENT OF CHF SUPERIMPOSED UPON COPD. AREAS OF ATELECTASIS, PATCHY INFILTRATES IN THE BASES LEFT GREATER THAN RIGHT    US DUP LOWER EXTREMITY RIGHT DANICA    Result Date: 8/22/2021  US DUP LOWER EXTREMITY RIGHT VEIN CLINICAL HISTORY:  LEG/THIGH PAIN COMPARISONS: FINDINGS: Grayscale as well as Duplex color and Spectra;  Doppler ultrasound of the deep venous system of the right lower extremity from the inguinal ligament to the popliteal fossa was performed. There are no findings of deep venous thrombus in the visualized vessels of the right lower extremity. NO FINDINGS OF DEEP VENOUS THROMBUS IN  THE VISUALIZED VESSELS OF THE RIGHT LOWER EXTREMITY. Most recent    Chest CT      WITH CONTRAST:No results found for this or any previous visit. WITHOUT CONTRAST: No results found for this or any previous visit. CXR      2-view: Results for orders placed during the hospital encounter of 04/20/21    XR CHEST (2 VW)    Narrative  EXAMINATION: Chest x-ray, 2 view    HISTORY: Shortness of breath    TECHNIQUE: Frontal and lateral views of the chest    COMPARISON: Portable chest radiograph from October 22, 2019    FINDINGS:    Atherosclerotic calcification of the thoracic aorta. Cardiomediastinal silhouette is within normal limits. No pneumothorax, pleural effusion, or consolidation. The lungs are hyperinflated and there is coarsening of the pulmonary interstitium. No acute  osseous abnormality. Impression  No radiographic evidence of acute intrathoracic process. Findings suggesting COPD. Results for orders placed during the hospital encounter of 03/28/19    XR CHEST STANDARD (2 VW)    Narrative  X-RAY: A CHEST, 2 VIEW(S):    CLINICAL HISTORY:  R06.02 SOB (shortness of breath) ICD10 , cough for past 3 months. DATE: 3/28/2019 1:06 PM    COMPARISONS: 9/27/2018    FINDINGS:  Normal cardiac silhouette. There are atherosclerotic calcifications in the aortic arch. Lungs are clear without consolidation. No pleural effusion or pneumothorax. There are moderate degenerative changes in spine. Impression  NO ACUTE CARDIOPULMONARY ABNORMALITY. NO CHANGE. Portable: Results for orders placed during the hospital encounter of 08/21/21    XR CHEST PORTABLE    Narrative  EXAMINATION: CHEST PORTABLE VIEW    CLINICAL HISTORY: Respiratory failure.     COMPARISONS: April 20,

## 2021-08-24 NOTE — CONSULTS
PODIATRIC MEDICINE AND SURGERY  CONSULT HISTORY AND PHYSICAL    Consulting Service: Medicine  Requesting Provider: Dr. Shanna Cheung regarding: leg ulcer  Staff Doctor:  Dr. Yen Devi    ASSESSMENT:  80 y.o. male with PMH significant for CAD, HLD, HTN, DMII for who podiatry was consulted for leg ulcer. Patient with venous insufficiency to BLE and venous stasis ulcer to right anterior leg. Ulcer is superificial and appears non infected. Patient complains of b/l groin pain that is worse with walking that could be consistent with intermittent claudication. Order for arterial duplex placed. PLAN AND RECOMMENDATIONS[de-identified]  Patient seen with staff, Dr. Lupe Ferguson. Wound care: Calcium alginate and mepilex border. Nursing orders placed. FWB to BLE   Elevate BLE at or above heart level while resting  Order for ammonium lactate for right leg and foot. Apply BID. Arterial duplex ultrasound ordered  Pain management per primary team   Podiatry to follow while in house   Patient will need follow up with Dr. Lupe Ferguson in wound care center within 7-10 days of discharge      HPI: This 80y.o. year old male was seen today for left leg ulcer. Patient states that his legs have been swelling for the past few months. He is unaware of the ulcer to his right leg. His main complaint his pain in bilateral groin. He states the pain is worse with ambulation. Patient reports walking prior to coming to the hospital. He describes a pain that is consistent with intermittent claudication. Patient reports having a good appetite. Patient denies nausea, vomiting, diarrhea, fevers, chills, chest pain, shortness of breath, head ache, or calf pain. No other pedal complaints.      Past Medical History:   Diagnosis Date    Anemia     Atrial fibrillation ([address]     CAD (coronary artery disease)     Hyperlipidemia     Hypertension     Type II or unspecified type diabetes mellitus without mention of complication, not stated as uncontrolled        Past Surgical History:   Procedure Laterality Date    CATARACT REMOVAL WITH IMPLANT Right     2013 - cathy walters    FOOT FRACTURE SURGERY  1980s    work related - fell off a ladder    PTCA  2008    4 stents - jill walters    ROTATOR CUFF REPAIR Right 2009       No current facility-administered medications on file prior to encounter.      Current Outpatient Medications on File Prior to Encounter   Medication Sig Dispense Refill    doxazosin (CARDURA) 2 MG tablet Take 1 tablet by mouth daily      hydrALAZINE (APRESOLINE) 100 MG tablet Take 1 tablet by mouth 2 times daily      isosorbide mononitrate (IMDUR) 60 MG extended release tablet Take 1 tablet by mouth daily      ciprofloxacin (CIPRO) 500 MG tablet Take 1 tablet by mouth 2 times daily for 10 days 20 tablet 0    SODIUM CHLORIDE, EXTERNAL, 0.9 % SOLN Apply 1 Squirt topically 2 times daily 1 Can 3    ferrous sulfate (KP FERROUS SULFATE) 325 (65 Fe) MG tablet take 1 tablet by mouth twice a day 180 tablet 1    omeprazole (PRILOSEC) 20 MG delayed release capsule TAKE 1 CAPSULE EVERY DAY 90 capsule 2    finasteride (PROSCAR) 5 MG tablet TAKE 1 TABLET EVERY DAY 90 tablet 3    apixaban (ELIQUIS) 2.5 MG TABS tablet Take 1 tablet by mouth 2 times daily 60 tablet 3    torsemide (DEMADEX) 20 MG tablet Take 2 tablets by mouth daily (Patient taking differently: Take 20 mg by mouth daily m-w-f 1 TABLET TWICE DAILY AND Tue, Thur sat and Sunday once a day) 60 tablet 3    carvedilol (COREG) 12.5 MG tablet Take 1 tablet by mouth 2 times daily (with meals) 60 tablet 3    insulin glargine (LANTUS) 100 UNIT/ML injection vial Inject 40 Units into the skin nightly 1 vial 3    Insulin Aspart (NOVOLOG FLEXPEN SC) Inject 15 Units into the skin Daily with supper       vitamin B-12 (CYANOCOBALAMIN) 1000 MCG tablet Take 1,000 mcg by mouth daily      atorvastatin (LIPITOR) 80 MG tablet Take 1 tablet by mouth daily (Patient taking differently: Take 80 mg by mouth daily Takes  At hs) 90 tablet 1    aspirin 81 MG tablet Take 81 mg by mouth daily.  losartan (COZAAR) 50 MG tablet Take 1 tablet by mouth daily (Patient not taking: Reported on 8/22/2021) 30 tablet 3       No Known Allergies    History reviewed. No pertinent family history. Social History     Socioeconomic History    Marital status:      Spouse name: Not on file    Number of children: Not on file    Years of education: Not on file    Highest education level: Not on file   Occupational History    Not on file   Tobacco Use    Smoking status: Never Smoker    Smokeless tobacco: Never Used   Substance and Sexual Activity    Alcohol use: No    Drug use: No    Sexual activity: Never   Other Topics Concern    Not on file   Social History Narrative    Not on file     Social Determinants of Health     Financial Resource Strain: Low Risk     Difficulty of Paying Living Expenses: Not hard at all   Food Insecurity: No Food Insecurity    Worried About [address] [employer] in the Last Year: Never true    [facility] [employer] in the Last Year: Never true   Transportation Needs:     Lack of Transportation (Medical):  Lack of Transportation (Non-Medical):    Physical Activity:     Days of Exercise per Week:     Minutes of Exercise per Session:    Stress:     Feeling of Stress :    Social Connections:     Frequency of Communication with Friends and Family:     Frequency of Social Gatherings with Friends and Family:     Attends Christian Services:     Active Member of Clubs or Organizations:     Attends Club or Organization Meetings:     Marital Status:    Intimate Partner Violence:     Fear of Current or Ex-Partner:     Emotionally Abused:     Physically Abused:     Sexually Abused:        Review of Systems  CONSTITUTIONAL: No fevers, chills, diaphoresis  HEENT: No epistaxis, tinnitus  EYES: No diplopia, blurry vision. CARDIOVASCULAR:  No chest pain, palpitations, lower extremity edema.  +  08/24/2021     Lab Results   Component Value Date     08/24/2021    K 4.5 08/24/2021     08/24/2021    CO2 33 08/24/2021    BUN 48 08/24/2021    CREATININE 2.17 08/24/2021    GLUCOSE 194 08/24/2021    GLUCOSE 55 12/18/2019    CALCIUM 8.6 08/24/2021      Lab Results   Component Value Date    LABALBU 3.0 (L) 08/24/2021     No results found for: Rosemarie Oconnell  No results found for: CRP  Lab Results   Component Value Date    LABA1C 8.4 02/24/2020       MICROBIOLOGY:   Blood culture 8/22/2021: Socrates Walden DPM,  PGY-2  Podiatric Surgery Resident  Podiatry On Call Pager: [phone]  August 24, 2021  9:53 AM

## 2021-08-24 NOTE — FLOWSHEET NOTE
Report from Violet DIAL at 2100 at bedside. Assessments and vital signs are as charted. Patient has orders to wear BiPAP overnight. Patient repeatedly removes bipap because he does not like it. RN reeducated patient about the benefits of wearing the BiPAP. Patient received bath due to he had a bladder spasm and soaked the bed in urine. RN will continue to monitor.

## 2021-08-24 NOTE — PROGRESS NOTES
[facility] Occupational Therapy      Date: 2021  Patient Name: Ana Knapp        MRN: [id number]  Account: [de-identified]   : 1931  (80 y.o.)  Room: [place] [address]    Chart reviewed, attempted OT at 1300 for eval. Patient not seen 2° to: Other: Spoke to ; per chart review, pt. pending BLE duplex and per  pt. pending transfer to . Will await results of duplex and being placed in new room and attempt again later. Spoke to Ebony as RN was unavailable. Will attempt again when able.     Electronically signed by AL Emerson on 2021 at 1:00 PM

## 2021-08-24 NOTE — FLOWSHEET NOTE
Patient asking for his dentures, I called his wife and she stated that his dentures and all his belongings are at home.

## 2021-08-24 NOTE — PROGRESS NOTES
Department of Internal Medicine  General Internal Medicine  Attending Progress Note      SUBJECTIVE:  Pt seen and examined. Awake and alert. Confused at baseline per nursing. Possible transfer to the floor later today. BIPAP as needed.  On Lasix drip    OBJECTIVE      Medications    Current Facility-Administered Medications: carvedilol (COREG) tablet 6.25 mg, 6.25 mg, Oral, BID WC  ammonium lactate (LAC-HYDRIN) 12 % lotion, , Topical, BID  furosemide (LASIX) 100 mg in dextrose 5 % 100 mL infusion, 10 mg/hr, IntraVENous, Continuous  sodium chloride flush 0.9 % injection 5-40 mL, 5-40 mL, IntraVENous, 2 times per day  sodium chloride flush 0.9 % injection 5-40 mL, 5-40 mL, IntraVENous, PRN  0.9 % sodium chloride infusion, 25 mL, IntraVENous, PRN  enoxaparin (LOVENOX) injection 30 mg, 30 mg, Subcutaneous, Daily  ondansetron (ZOFRAN-ODT) disintegrating tablet 4 mg, 4 mg, Oral, Q8H PRN **OR** ondansetron (ZOFRAN) injection 4 mg, 4 mg, IntraVENous, Q6H PRN  acetaminophen (TYLENOL) tablet 650 mg, 650 mg, Oral, Q6H PRN **OR** acetaminophen (TYLENOL) suppository 650 mg, 650 mg, Rectal, Q6H PRN  insulin lispro (HUMALOG) injection vial 0-12 Units, 0-12 Units, Subcutaneous, TID WC  insulin lispro (HUMALOG) injection vial 0-6 Units, 0-6 Units, Subcutaneous, Nightly  glucose (GLUTOSE) 40 % oral gel 15 g, 15 g, Oral, PRN  dextrose 50 % IV solution, 12.5 g, IntraVENous, PRN  glucagon (rDNA) injection 1 mg, 1 mg, Intramuscular, PRN  dextrose 5 % solution, 100 mL/hr, IntraVENous, PRN  insulin glargine (LANTUS) injection vial 40 Units, 40 Units, Subcutaneous, Nightly  piperacillin-tazobactam (ZOSYN) 3,375 mg in dextrose 5 % 50 mL IVPB extended infusion (mini-bag), 3,375 mg, IntraVENous, Q8H  naloxone (NARCAN) injection 0.4 mg, 0.4 mg, IntraVENous, PRN  dextrose 50 % IV solution, 25 g, IntraVENous, PRN  Physical    VITALS:  BP (!) 140/54   Pulse 91   Temp 98.6 °F (37 °C) (Oral)   Resp 20   Ht 5' 10\" (1.778 m)   Wt 211 lb 3.2 oz (95.8 kg)   SpO2 95%   BMI 30.30 kg/m²   Constitutional: Awake and alert in no acute distress. Lying in bed comfortably  Head: Normocephalic, atraumatic  Eyes: EOMI, PERRLA  ENT: moist mucous membranes  Neck: neck supple, trachea midline  Lungs: Good inspiratory effort, no wheeze, no rhonchi, no rales  Heart: RRR, normal S1 and S2  GI: Soft, non-distended, non tender, no guarding, no rebound, +BS  MSK: trace edema noted  Skin: warm, dry, bilateral heel wounds and open wound on left shin with oozing     Data    CBC:   Lab Results   Component Value Date    WBC 7.2 08/24/2021    RBC 3.29 08/24/2021    RBC 4.92 03/01/2012    HGB 9.7 08/24/2021    HCT 30.2 08/24/2021    MCV 91.9 08/24/2021    MCH 29.4 08/24/2021    MCHC 32.0 08/24/2021    RDW 16.0 08/24/2021     08/24/2021    MPV 9.4 10/16/2015     CMP:    Lab Results   Component Value Date     08/24/2021    K 4.5 08/24/2021     08/24/2021    CO2 33 08/24/2021    BUN 48 08/24/2021    CREATININE 2.17 08/24/2021    GFRAA 34.8 08/24/2021    LABGLOM 28.7 08/24/2021    GLUCOSE 194 08/24/2021    GLUCOSE 55 12/18/2019    PROT 5.6 08/24/2021    LABALBU 3.0 08/24/2021    LABALBU 4.3 03/01/2012    CALCIUM 8.6 08/24/2021    BILITOT 0.6 08/24/2021    ALKPHOS 167 08/24/2021    AST 58 08/24/2021    ALT 43 08/24/2021       ASSESSMENT AND PLAN      # Acute hypoxic and hypercapnic respiratory failure with acute metabolic encephalopathy  - initially placed on BIPAP and transferred to the ICU on 8/22  - ABG improved on BIPAP. Encephalopathy improved  - intensivist consulted  - now on nasal cannula. 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 Monitor UOP  - monitor electrolytes closely    # PAF  - resumed home coreg. Monitor on tele  - cont home meds    # IDDM  - ISS, lantus    DVT: lovenox    Disposition: Pt much improved today. On nasal cannula and lasix drip. Monitoring renal function and potassium closely. Transfer to the floor later today if ok from pulm stand.       Min Gonsalez, DO  Internal Medicine

## 2021-08-25 NOTE — DISCHARGE INSTR - COC
Continuity of Care Form    Patient Name: Natan Noland   :  1931  MRN:  [id number]    Admit date:  2021  Discharge date:  21    Code Status Order: Full Code   Advance Directives:      Admitting Physician: Valery Martinez MD  PCP: Tyrell Smith PA-C    Discharging Nurse: [facility]/Room#: [address] Phone Number: [phone]    Emergency Contact:   Extended Emergency Contact Information  Primary Emergency Contact: Nereida Michel  Address: [address]           [place], [address] [facility] [address]  Phone: [phone]  Relation: Spouse  Secondary Emergency Contact: Bernabe Stevenson  Mobile Phone: [phone]  Relation: Child  Preferred language: English   needed?  No    Past Surgical History:  Past Surgical History:   Procedure Laterality Date    CATARACT REMOVAL WITH IMPLANT Right      - cathy walters    FOOT FRACTURE SURGERY  1980s    work related - fell off a ladder    PTCA      4 stents - jill walters    ROTATOR CUFF REPAIR Right 2009       Immunization History:   Immunization History   Administered Date(s) Administered    COVID-19, Pfizer, PF, 30mcg/0.3mL 2021    Influenza A ([id number]) Vaccine IM 2010    Influenza Vaccine, unspecified formulation 10/27/2009, 2010, 2011, 2012, 2013, 2014    Influenza, High Dose (Fluzone 65 yrs and older) 10/20/2015, 2016, 10/11/2017    Influenza, Ora Sukhwinder, 6 mo and older, IM, PF (Flulaval, Fluarix) 2018    Influenza, Triv, inactivated, subunit, adjuvanted, IM (Fluad 65 yrs and older) 10/29/2019    Pneumococcal Conjugate 13-valent ([id number]) 2015    Pneumococcal Conjugate Vaccine 2001    Pneumococcal Polysaccharide ([id number]) 2001    Pneumococcal Vaccine 2001       Active Problems:  Patient Active Problem List   Diagnosis Code    Hyperlipidemia E78.5    Hypertension I10    CHF (congestive heart failure), NYHA class I, acute on chronic, combined ([facility]) I50.43    CKD stage 3 due to type 2 diabetes mellitus ([facility] E11.22, N18.30    Coronary artery disease involving native coronary artery of native heart without angina pectoris I25.10    Atrial fibrillation ([facility]) I48.91    Essential hypertension I10    DM2 (diabetes mellitus, type 2) ([facility] E11.9    Leg wound, left, sequela S81.802S    Non-pressure chronic ulcer of other part of right lower leg with fat layer exposed ([facility] L97.812    Chronic venous hypertension (idiopathic) with ulcer and inflammation of right lower extremity (CODE) ([facility]) I87.331    Venous insufficiency of both lower extremities I87.2    Hyperkalemia E87.5    Right thigh pain M79.651    Acute on chronic respiratory failure with hypercapnia ([facility]) J96.22       Isolation/Infection:   Isolation            No Isolation          Patient Infection Status       None to display            Nurse Assessment:  Last Vital Signs: /88   Pulse 85   Temp 99 °F (37.2 °C) (Oral)   Resp 20   Ht 5' 10\" (1.778 m)   Wt 211 lb 3.2 oz (95.8 kg)   SpO2 95%   BMI 30.30 kg/m²     Last documented pain score (0-10 scale): Pain Level: 8  Last Weight:   Wt Readings from Last 1 Encounters:   08/23/21 211 lb 3.2 oz (95.8 kg)     Mental Status:  oriented, alert, coherent, logical, thought processes intact and able to concentrate and follow conversation    IV Access:  - Peripheral IV - site  bilateral arms, insertion date: 8/22/21    Nursing Mobility/ADLs:  Walking   Dependent  Transfer  Assisted  Bathing  Dependent  Dressing  Dependent  Toileting  Dependent  Feeding  Assisted  Med Admin  Assisted  Med Delivery   whole    Wound Care Documentation and Therapy:        Elimination:  Continence:   · Bowel: No  · Bladder: arevalo  Urinary Catheter:  Insertion Date: 8/21/21   Colostomy/Ileostomy/Ileal Conduit: No       Date of Last BM: 8/24/21    Intake/Output Summary (Last 24 hours) at 8/25/2021 1934  Last data filed at 8/25/2021 [id number]  Gross per 24 hour   Intake 480 ml   Output 4700 ml   Net -4220 ml     I/O last 3 completed shifts: In: 480 [P.O.:360; I.V.:120]  Out: 4700 [Urine:4700]    Safety Concerns: At Risk for Falls    Impairments/Disabilities:      Hearing    Nutrition Therapy:  Current Nutrition Therapy:   - Oral Diet:  Carb Control 4 carbs/meal (1800kcals/day)    Routes of Feeding: Oral  Liquids: Thin Liquids  Daily Fluid Restriction: no  Last Modified Barium Swallow with Video (Video Swallowing Test): not done    Treatments at the Time of Hospital Discharge:   Respiratory Treatments: ***  Oxygen Therapy:  is on oxygen at 3 L/min per nasal cannula. Ventilator:    - BiPAP   IPAP: 18 cmH20, CPAP/EPAP: 6 cmH2O only when sleeping    Rehab Therapies: Physical Therapy and Occupational Therapy  Weight Bearing Status/Restrictions: No weight bearing restirctions  Other Medical Equipment (for information only, NOT a DME order):  wheelchair, walker, bedside commode and hospital bed  Other Treatments:  Ace wraps from toes to below knees BLE    Patient's personal belongings (please select all that are sent with patient):  Glasses, Hearing Aides bilateral, Dentures upper and lower    RN SIGNATURE:  Electronically signed by Bea Crockett RN on 8/26/21 at 4:24 PM EDT    CASE MANAGEMENT/SOCIAL WORK SECTION    Inpatient Status Date: ***    Readmission Risk Assessment Score:  Readmission Risk              Risk of Unplanned Readmission:  19           Discharging to Facility/ Agency   · Name: [facility]   · Address:  · Phone: [phone]  · Fax:    Dialysis Facility (if applicable)   · Name:  · Address:  · Dialysis Schedule:  · Phone:  · Fax:    / signature:Electronically signed by JENNI Soto on 8/25/21 at 3:00 PM EDT      PHYSICIAN SECTION    Prognosis: Good    Condition at Discharge: Stable    Rehab Potential (if transferring to Rehab): Good    Recommended Labs or Other Treatments After Discharge: Physician Certification: I certify the above information and transfer of Enrico Rizzo  is necessary for the continuing treatment of the diagnosis listed and that he requires [facility] for less 30 days.      Update Admission H&P: No change in H&P    PHYSICIAN SIGNATURE:  Electronically signed by Kelsie Wright DO on 8/26/21 at 11:49 AM EDT

## 2021-08-25 NOTE — PROGRESS NOTES
[facility] Occupational Therapy      Date: 2021  Patient Name: Mary Delgado        MRN: [id number]  Account: [de-identified]   : 1931  (80 y.o.)  Room: [place] [address]    Chart reviewed, attempted OT at 1000 for evaluation. Patient not seen 2° to:    Pt. off floor for test/procedure    Spoke to YOJANA Mendez Asp RN aware. Will attempt again when able.     Electronically signed by AL Leger on  at 10:12 AM

## 2021-08-25 NOTE — PROGRESS NOTES
Physical Therapy Med Surg Initial Assessment  Facility/Department: [address] [place] [address]  Room: [facility]/X224-21       NAME: Cynthia Du  : 1931 (70 y.o.)  MRN: [id number]  CODE STATUS: Full Code    Date of Service: 2021    Patient Diagnosis(es): Hyperkalemia [E87.5]  Right leg pain [M79.604]  BART (acute kidney injury) [facility]) [N17.9]   Chief Complaint   Patient presents with    Leg Pain     right upper leg     Patient Active Problem List    Diagnosis Date Noted    CKD stage 3 due to type 2 diabetes mellitus ([facility] 2018    Coronary artery disease involving native coronary artery of native heart without angina pectoris 2018    Atrial fibrillation ([facility] 2018    Essential hypertension 2018    DM2 (diabetes mellitus, type 2) ([address] 2018    Acute on chronic respiratory failure with hypercapnia (HCC)     Hyperkalemia 2021    Right thigh pain 2021    Hypertension 2020    Venous insufficiency of both lower extremities 2020    Non-pressure chronic ulcer of other part of right lower leg with fat layer exposed ([address] 2020    Chronic venous hypertension (idiopathic) with ulcer and inflammation of right lower extremity (CODE) ([facility] 2020    Leg wound, left, sequela 2019    CHF (congestive heart failure), NYHA class I, acute on chronic, combined ([address] 2018    Hyperlipidemia         Past Medical History:   Diagnosis Date    Anemia     Atrial fibrillation ([address]     CAD (coronary artery disease)     Hyperlipidemia     Hypertension     Type II or unspecified type diabetes mellitus without mention of complication, not stated as uncontrolled      Past Surgical History:   Procedure Laterality Date    CATARACT REMOVAL WITH IMPLANT Right      - cathy walters    FOOT FRACTURE SURGERY  1980s    work related - fell off a ladder    PTCA      4 stents - jill walters    ROTATOR CUFF REPAIR Right        Chart Reviewed: Yes    Restrictions:  Restrictions/Precautions: Fall Risk     SUBJECTIVE:      Pain  Pre Treatment Pain Screening  Intervention List: Patient able to continue with treatment  Comments / Details: nir arms - 10/10    Post Treatment Pain Screening:   Pain Assessment  Pain Level: 8  Pain Location: Shoulder    Prior Level of Function:  Social/Functional History  Lives With: Spouse  Type of Home: House  Home Layout: One level  Home Access: Stairs to enter without rails  Entrance Stairs - Number of Steps: 1  Bathroom Shower/Tub: Walk-in shower  Bathroom Equipment: Shower chair, Grab bars in shower, Hand-held shower  Home Equipment: Rolling walker  ADL Assistance: Independent  Ambulation Assistance: Independent (with ww)  Transfer Assistance: Independent  Active : No  Additional Comments: no falls - left handed    OBJECTIVE:   Vision: Impaired  Vision Exceptions: Wears glasses for reading  Hearing: Exceptions to inthinc  Hearing Exceptions: Hard of hearing/hearing concerns; Left hearing aid    Cognition:  Overall Orientation Status: Impaired  Orientation Level: Oriented to person, Oriented to situation  Follows Commands: Impaired (hearing significantly impared)         ROM:  RLE PROM: WFL  LLE PROM: WFL    Strength:  Strength RLE  Comment: 3+/5  Strength LLE  Comment: 3+/5  Strength Other  Other: POOR ABDOMINAL STRENGHT    Neuro:  Balance  Sitting - Static: Fair  Sitting - Dynamic: Fair  Standing - Static: Poor (Pt requires bilat UE supportfor standing and gait with assistance for balance losses in all directions)  Standing - Dynamic: Poor             Bed mobility  Supine to Sit: 2 Person assistance; Moderate assistance  Sit to Supine: Moderate assistance;2 Person assistance  Comment: HOB elevated due to poor respiratory status    Transfers  Sit to Stand: Moderate Assistance;2 Person Assistance  Stand to sit: 2 Person Assistance; Moderate Assistance  Comment: lifting assista required    Ambulation  Ambulation?: Yes  Ambulation 1  Device: Rolling Walker  Assistance: Maximum assistance (second person SBA)  Quality of Gait: flexed trunk, weight shifting assist required, knees stable, variable hiip stability, assist for walker movement required  Distance: 2 steps each forward and lateral  Comments: Pt with signficiant SOB during each aspect of task - second person required due to concerns for pts breathing abilities and hearing deficits    Stairs/Curb  Stairs?: No         Activity Tolerance  Activity Tolerance: Patient Tolerated treatment well;Patient limited by endurance          PT Education  PT Education: Goals;PT Role;Plan of Care    ASSESSMENT:   Body structures, Functions, Activity limitations: Decreased functional mobility ; Decreased safe awareness;Decreased balance;Decreased endurance;Decreased strength  Decision Making: Medium Complexity  History: high  Exam: mod  Clinical Presentation: high    Barriers to Learning: hearing    DISCHARGE RECOMMENDATIONS:  Discharge Recommendations: Continue to assess pending progress    Assessment: Pt demonstrates deficits as listed. He is requiring assistance at a greater level than prior to admission.  Pt is in need of PT prior to safe return to home  REQUIRES PT FOLLOW UP: Yes      PLAN OF CARE:  Plan  Times per week: 3-6  Current Treatment Recommendations: Strengthening, Transfer Training, Endurance Training, Neuromuscular Re-education, Patient/Caregiver Education & Training, Equipment Evaluation, Education, & procurement, Balance Training, Gait Training, Functional Mobility Training, Safety Education & Training, Home Exercise Program  Safety Devices  Type of devices: Bed alarm in place, Call light within reach, Left in chair    Goals:  Short term goals  Short term goal 1: min assist bed mobility  Short term goal 2: min assist sit to stand +1  Short term goal 3: mod assist gait 20 feet with ww  Short term goal 4: Pt able to stand 2 min with BUE support and min assist    AMPAC (6

## 2021-08-25 NOTE — PROGRESS NOTES
PODIATRIC MEDICINE AND SURGERY  CONSULT PROGRESS NOTE    Consulting Service: Medicine  Requesting Provider: Dr. Serjio Mata regarding: leg ulcer  Staff Doctor:  Dr. Queen Arellano    ASSESSMENT:  80 y.o. male with PMH significant for CAD, HLD, HTN, DMII for who podiatry was consulted for leg ulcer. Patient with venous insufficiency to BLE and venous stasis ulcer to right anterior leg. Previous superficial ulcer noted to the left anterior leg is healed. PLAN AND RECOMMENDATIONS[de-identified]  Patient seen with staff, Dr. Meron Kurtz. Wound care: Calcium alginate and mepilex border. Nursing orders placed. FWB to BLE   Elevate BLE at or above heart level while resting  Order for ammonium lactate for right leg and foot. Apply BID. Arterial duplex ultrasound: no significant findings   Pain management per primary team   Podiatry will sign off. Please re-consult as needed. Patient will need follow up with Dr. Meron Kurtz in wound care center within 7-10 days of discharge      Internal events:   -Confused after transfer  -Low grade fever t-mac 99.4  -Hard of hearing   -Complains of body aches      Past Medical History:   Diagnosis Date    Anemia     Atrial fibrillation ([address]     CAD (coronary artery disease)     Hyperlipidemia     Hypertension     Type II or unspecified type diabetes mellitus without mention of complication, not stated as uncontrolled        Past Surgical History:   Procedure Laterality Date    CATARACT REMOVAL WITH IMPLANT Right     2013 - cathy walters    FOOT FRACTURE SURGERY  1980s    work related - fell off a ladder    PTCA  2008    4 stents - jill walters    ROTATOR CUFF REPAIR Right 2009       No current facility-administered medications on file prior to encounter.      Current Outpatient Medications on File Prior to Encounter   Medication Sig Dispense Refill    doxazosin (CARDURA) 2 MG tablet Take 1 tablet by mouth daily      hydrALAZINE (APRESOLINE) 100 MG tablet Take 1 tablet by mouth 2 times daily      isosorbide mononitrate (IMDUR) 60 MG extended release tablet Take 1 tablet by mouth daily      ciprofloxacin (CIPRO) 500 MG tablet Take 1 tablet by mouth 2 times daily for 10 days 20 tablet 0    SODIUM CHLORIDE, EXTERNAL, 0.9 % SOLN Apply 1 Squirt topically 2 times daily 1 Can 3    ferrous sulfate (KP FERROUS SULFATE) 325 (65 Fe) MG tablet take 1 tablet by mouth twice a day 180 tablet 1    omeprazole (PRILOSEC) 20 MG delayed release capsule TAKE 1 CAPSULE EVERY DAY 90 capsule 2    finasteride (PROSCAR) 5 MG tablet TAKE 1 TABLET EVERY DAY 90 tablet 3    apixaban (ELIQUIS) 2.5 MG TABS tablet Take 1 tablet by mouth 2 times daily 60 tablet 3    torsemide (DEMADEX) 20 MG tablet Take 2 tablets by mouth daily (Patient taking differently: Take 20 mg by mouth daily m-w-f 1 TABLET TWICE DAILY AND Tue, Thur sat and Sunday once a day) 60 tablet 3    carvedilol (COREG) 12.5 MG tablet Take 1 tablet by mouth 2 times daily (with meals) 60 tablet 3    insulin glargine (LANTUS) 100 UNIT/ML injection vial Inject 40 Units into the skin nightly 1 vial 3    Insulin Aspart (NOVOLOG FLEXPEN SC) Inject 15 Units into the skin Daily with supper       vitamin B-12 (CYANOCOBALAMIN) 1000 MCG tablet Take 1,000 mcg by mouth daily      atorvastatin (LIPITOR) 80 MG tablet Take 1 tablet by mouth daily (Patient taking differently: Take 80 mg by mouth daily Takes  At hs) 90 tablet 1    aspirin 81 MG tablet Take 81 mg by mouth daily.  losartan (COZAAR) 50 MG tablet Take 1 tablet by mouth daily (Patient not taking: Reported on 8/22/2021) 30 tablet 3       No Known Allergies    History reviewed. No pertinent family history.     Social History     Socioeconomic History    Marital status:      Spouse name: Not on file    Number of children: Not on file    Years of education: Not on file    Highest education level: Not on file   Occupational History    Not on file   Tobacco Use    Smoking status: Never Smoker    Smokeless tobacco: Never Used   Substance and Sexual Activity    Alcohol use: No    Drug use: No    Sexual activity: Never   Other Topics Concern    Not on file   Social History Narrative    Not on file     Social Determinants of Health     Financial Resource Strain: Low Risk     Difficulty of Paying Living Expenses: Not hard at all   Food Insecurity: No Food Insecurity    Worried About Running Out of Food in the Last Year: Never true    Tate of Food in the Last Year: Never true   Transportation Needs:     Lack of Transportation (Medical):  Lack of Transportation (Non-Medical):    Physical Activity:     Days of Exercise per Week:     Minutes of Exercise per Session:    Stress:     Feeling of Stress :    Social Connections:     Frequency of Communication with Friends and Family:     Frequency of Social Gatherings with Friends and Family:     Attends Alevism Services:     Active Member of Clubs or Organizations:     Attends Club or Organization Meetings:     Marital Status:    Intimate Partner Violence:     Fear of Current or Ex-Partner:     Emotionally Abused:     Physically Abused:     Sexually Abused:        Review of Systems  CONSTITUTIONAL: No fevers, chills, diaphoresis  HEENT: No epistaxis, tinnitus  EYES: No diplopia, blurry vision. CARDIOVASCULAR:  No chest pain, palpitations, lower extremity edema. + claudication  PULM: No dyspnea, tachypnea, wheezing  GI: No nausea, vomiting, constipation, diarrhea  : No dysuria, gross hematuria, or pyuria  NEURO:  No new balance problems, peripheral weakness, paresthesias, numbness  MSK: B/l groin pain. Pain to groin with ambulation.    PSY: No concerns regarding depression, anxiety  INTEGUMENTARY:  open skin lesion to left leg    OBJECTIVE:  /88   Pulse 85   Temp 99 °F (37.2 °C) (Oral)   Resp 20   Ht 5' 10\" (1.778 m)   Wt 211 lb 3.2 oz (95.8 kg)   SpO2 95%   BMI 30.30 kg/m²   Patient is alert and oriented to person, place, and time    Vascular:   Faintly Palpable Dorsalis Pedis and Palpable Posterior Tibial Pulses B/L   Capillary Fill time < 3 seconds to B/L digits  Skin temperature warm to cool tibial tuberosity to the digits B/L  Hair growth absent to digits  Edema to BLE is much improved today, with wrinkles in skin     Neurological:   Sensation to light touch intact B/L  Protective sensation via monofilament testing intact B/L    Musculoskeletal/Orthopaedic:   Structural Deformities: plantigrade foot   5/5 muscle strength Dorsiflexion, Plantarflexion, Inversion, Eversion B/L    Dermatological:   Diffuse xerosis noted to the right leg and foot. Skin is supple with good temperature, texture, turgor  No hyperkeratotic lesions noted  Nails 1-5 B/L appear unremarkable. Interspaces 1-4 B/L are clear and without debris    Ulceration #1:   Location: Anterior left leg   Today wound has dried up and is healed           LABS:   Lab Results   Component Value Date    WBC 8.5 08/25/2021    HGB 9.6 (L) 08/25/2021    HCT 30.5 (L) 08/25/2021    MCV 90.7 08/25/2021     08/25/2021     Lab Results   Component Value Date     08/25/2021    K 4.4 08/25/2021     08/25/2021    CO2 33 08/25/2021    BUN 46 08/25/2021    CREATININE 2.22 08/25/2021    GLUCOSE 174 08/25/2021    GLUCOSE 55 12/18/2019    CALCIUM 8.6 08/25/2021      Lab Results   Component Value Date    LABALBU 2.9 (L) 08/25/2021     No results found for: Chryl Anza  No results found for: CRP  Lab Results   Component Value Date    LABA1C 8.4 02/24/2020       MICROBIOLOGY:   Blood culture 8/22/2021: NGTD    Imaging:   RESULT:       RIGHT:   Common femoral artery: 138 cm/s with triphasic waveform. Femoral artery proximal: 34 cm/s with biphasic waveform. Femoral artery mid: 59 cm/s with monophasic waveform. Femoral artery distal: 68 cm/s with monophasic waveform. Popliteal mid: 36 cm/s with monophasic waveform.    Anterior tibial artery proximal: Not visualized. Anterior tibial artery mid: Not visualized. Anterior tibial artery distal: Not visualized. Posterior tibial artery proximal: 58 cm/s with monophasic waveform. Posterior tibial artery mid: 42 cm/s with monophasic waveform. Posterior tibial artery distal: 78 cm/s with monophasic waveform. Peroneal artery proximal: 49 cm/s with monophasic waveform. Peroneal artery mid: 37 cm/s with monophasic waveform. Peroneal artery distal: 35 cm/s with monophasic waveform.       LEFT:   Common femoral artery: 157 cm/s with biphasic waveform. Femoral artery proximal: 119 cm/s with biphasic waveform. Femoral artery mid: 164 cm/s with biphasic waveform. Femoral artery distal: 183 cm/s with biphasic waveform. Popliteal mid: 64 cm/s with biphasic waveform. Anterior tibial artery proximal: Not visualized. Anterior tibial artery mid: Not visualized. Anterior tibial artery distal: Not visualized. Posterior tibial artery proximal: 116 cm/s with monophasic waveform. Posterior tibial artery mid: 120 cm/s with biphasic waveform. Posterior tibial artery distal: 138 cm/s with biphasic waveform. Peroneal artery proximal: 74 cm/s with monophasic waveform. Peroneal artery mid: 80 cm/s with biphasic waveform. Peroneal artery distal: 77 cm/s with biphasic waveform.           Impression       1. The peak systolic velocities in the left common femoral artery is 157 cm/s, in the left mid femoral artery is 164 cm/s, in the distal left femoral artery is 183 cm/s, this is suggestive of 30-49% stenosis. 2. There is no significant stenosis in the right lower extremity. However, there is predominantly monophasic flow in the right lower extremity. 3. There is predominantly biphasic and monophasic flow in the left lower extremity.          Basia Peña DPM,  PGY-2  Podiatric Surgery Resident  Podiatry On Call Pager: [phone]  August 25, 2021  2:53 PM

## 2021-08-25 NOTE — PROGRESS NOTES
INPATIENT PROGRESS NOTES    PATIENT NAME: Delphine Carlson  MRN: [id number]  SERVICE DATE:  August 25, 2021   SERVICE TIME:  10:01 AM      PRIMARY SERVICE: Pulmonary Disease    CHIEF COMPLAIN: Hypercapnic respiratory failure      INTERVAL HPI: Patient seen and examined at bedside, Interval Notes, orders reviewed. Nursing notes noted  Patient is alert awake. Very hard of hearing. He is on 3 L O2 via nasal cannula and his O2 saturation is 96%. No shortness of breath. No fever or chills. No chest pain. No nausea vomiting diarrhea. He is comfortable in bed. He is on BiPAP at night. OBJECTIVE    Body mass index is 30.3 kg/m². PHYSICAL EXAM:  Vitals:  BP (!) 131/49   Pulse 74   Temp 99.4 °F (37.4 °C) (Oral)   Resp 20   Ht 5' 10\" (1.778 m)   Wt 211 lb 3.2 oz (95.8 kg)   SpO2 96%   BMI 30.30 kg/m²   General: Alert, awake . comfortable in bed, No distress. Hard of hearing  Head: Atraumatic , Normocephalic   Eyes: PERRL. No sclera icterus. No conjunctival injection. No discharge   ENT: No nasal  discharge. Pharynx clear. Neck:  Trachea midline. No thyromegaly, no JVD, No cervical adenopathy. Chest : Bilaterally symmetrical ,Normal effort,  No accessory muscle use  Lung : . Fair BS bilateral, decreased BS at bases. No Rales. No wheezing. No rhonchi. Heart[de-identified] Normal  rate. Regular rhythm. No mumur ,  Rub or gallop  ABD: Mild tenderness. No masses. No organmegaly. Normal bowel sounds. No hernia.   Ext : No Pitting both leg , No Cyanosis No clubbing  Neuro: Alert awake, no focal weakness          DATA:   Recent Labs     08/24/21  0504 08/25/21  0635   WBC 7.2 8.5   HGB 9.7* 9.6*   HCT 30.2* 30.5*   MCV 91.9 90.7    208     Recent Labs     08/24/21  0504 08/24/21  0504 08/25/21  0635     --  145*   K 4.5  --  4.4     --  101   CO2 33*  --  33*   BUN 48*  --  46*   CREATININE 2.17*  --  2.22*   GLUCOSE 194*  --  174*   CALCIUM 8.6  --  8.6   PROT 5.6*  --  5.7*   LABALBU 3.0*  --  2.9* BILITOT 0.6  --  0.7   ALKPHOS 167*  --  176*   AST 58*  --  53*   ALT 43*   < > 49*   LABGLOM 28.7*   < > 28.0*   GFRAA 34.8*   < > 33.9*   GLOB 2.6   < > 2.8    < > = values in this interval not displayed. MV Settings:     Rate Set: 16 bmp  FiO2 : 40 %  PEEP/CPAP: 6  Pressure Support: 0 cmH20  Peak Inspiratory Pressure: 17 cmH2O    Recent Labs     08/23/21  1035   PHART 7.317*   FAT3MDT 64*   PO2ART 162*   YOM7KDL 32.7*   BEART 7*   U2DIVUKM 99*       O2 Device: Nasal cannula  O2 Flow Rate (L/min): 3 L/min    ADULT DIET; Regular; Low Potassium (Less than 3000 mg/day); Low Phosphorus (Less than 1000 mg)     MEDICATIONS during current hospitalization:    Continuous Infusions:   furosemide (LASIX) 1mg/ml infusion 10 mg/hr (08/24/21 1521)    sodium chloride      dextrose         Scheduled Meds:   carvedilol  6.25 mg Oral BID WC    ammonium lactate   Topical BID    sodium chloride flush  5-40 mL IntraVENous 2 times per day    enoxaparin  30 mg Subcutaneous Daily    insulin lispro  0-12 Units Subcutaneous TID WC    insulin lispro  0-6 Units Subcutaneous Nightly    insulin glargine  40 Units Subcutaneous Nightly    piperacillin-tazobactam  3,375 mg IntraVENous Q8H       PRN Meds:sodium chloride flush, sodium chloride, ondansetron **OR** ondansetron, acetaminophen **OR** acetaminophen, glucose, dextrose, glucagon (rDNA), dextrose, naloxone, dextrose    Radiology  XR FEMUR RIGHT (MIN 2 VIEWS)    Result Date: 8/22/2021  EXAMINATION: XR FEMUR RIGHT (MIN 2 VIEWS) DATE AND TIME:8/21/2021 10:39 PM CLINICAL HISTORY: Acute right femur pain  PAIN  COMPARISON:None  FINDINGS:Views of the right femur reveal the bones to be intact and, free of fracture or dislocation. There are moderate degenerative changes noted. IMPRESSION: No acute osseous abnormality of the right femur    CT HEAD WO CONTRAST    Result Date: 8/23/2021  CT Brain. Contrast medium:  without contrast.. History: Altered mental status.  Technical factors: CT imaging of the brain was obtained and formatted as 5 mm contiguous axial images. 2.5 mm contiguous axial images were obtained through the osseous structures. Sagittal and coronal reconstruction obtained during postprocessing. Comparison:  CT brain, August 22, 2021. Findings: Extra-axial spaces:  Normal. Intracranial hemorrhage:  None. Ventricular system: Ventricles mildly enlarged. Sulci mildly prominent. Basal Cisterns:  Normal. Cerebral Parenchyma: Bilateral symmetric periventricular areas decreased attenuation. Midline Shift:  None. Cerebellum:  Normal. Paranasal sinuses and mastoid air cells: Opacification bilateral mastoid air cells again identified, unchanged. Visualized Orbits: Remote right ocular surgery. Impression: Chronic bilateral mastoiditis. Mild cerebral atrophy. Chronic ischemic white matter disease. All CT scans at this facility use dose modulation, iterative reconstruction, and/or weight based dosing when appropriate to reduce radiation dose to as low as reasonably achievable. CT HEAD WO CONTRAST    Result Date: 8/23/2021  EXAMINATION: CT HEAD WO CONTRAST CLINICAL HISTORY: resp failure COMPARISON:  NONE AVAILABLE An unenhanced scan is performed. FINDINGS:   There is no bleed, mass effect, or space occupying lesion. No extra-axial mass or fluid collections. Global appropriate senescent changes of aging noted. No acute process in the brain. Opacification of the mastoid air cells consistent with chronic mastoiditis. NO ACUTE PROCESS IN THE BRAIN. CHRONIC BILATERAL MASTOIDITIS All CT scans at this facility use dose modulation, iterative reconstruction, and/or weight based dosing when appropriate to reduce radiation dose to as low as reasonably achievable. XR CHEST PORTABLE    Result Date: 8/22/2021  EXAMINATION: CHEST PORTABLE VIEW  CLINICAL HISTORY: Respiratory failure. COMPARISONS: April 20, 2021  FINDINGS: 2 views of the chest is submitted.   The cardiac silhouette is Posterior tibial artery mid: 42 cm/s with monophasic waveform. Posterior tibial artery distal: 78 cm/s with monophasic waveform. Peroneal artery proximal: 49 cm/s with monophasic waveform. Peroneal artery mid: 37 cm/s with monophasic waveform. Peroneal artery distal: 35 cm/s with monophasic waveform. LEFT: Common femoral artery: 157 cm/s with biphasic waveform. Femoral artery proximal: 119 cm/s with biphasic waveform. Femoral artery mid: 164 cm/s with biphasic waveform. Femoral artery distal: 183 cm/s with biphasic waveform. Popliteal mid: 64 cm/s with biphasic waveform. Anterior tibial artery proximal: Not visualized. Anterior tibial artery mid: Not visualized. Anterior tibial artery distal: Not visualized. Posterior tibial artery proximal: 116 cm/s with monophasic waveform. Posterior tibial artery mid: 120 cm/s with biphasic waveform. Posterior tibial artery distal: 138 cm/s with biphasic waveform. Peroneal artery proximal: 74 cm/s with monophasic waveform. Peroneal artery mid: 80 cm/s with biphasic waveform. Peroneal artery distal: 77 cm/s with biphasic waveform. 1. The peak systolic velocities in the left common femoral artery is 157 cm/s, in the left mid femoral artery is 164 cm/s, in the distal left femoral artery is 183 cm/s, this is suggestive of 30-49% stenosis. 2. There is no significant stenosis in the right lower extremity. However, there is predominantly monophasic flow in the right lower extremity. 3. There is predominantly biphasic and monophasic flow in the left lower extremity. US RETROPERITONEAL LIMITED    Result Date: 8/24/2021  EXAMINATION:   RENAL ULTRASOUND HISTORY:   acute renal failure / ckd stage 4   E87.5 Hyperkalemia ICD10 TECHNIQUE:  Sonography of the kidneys was performed. Images were obtained and stored in a permanent archive. COMPARISON: None RESULT: Limited examination due to patient body habitus.  Right Kidney:      -Renal length: 12.3 cm      -Parenchyma: Parenchyma echogenicity is increased. Normal parenchymal thickness.      -Collecting system: No hydronephrosis. -Calculus: No echogenic, shadowing calculus.      -Lesion:  None. Left Kidney:          -Renal length: 11.5 cm      -Parenchyma: Parenchyma echogenicity is increased. Normal parenchymal thickness.      -Collecting system: No hydronephrosis. -Calculus: No echogenic, shadowing calculus.      -Lesion:  None. Bladder: Not evaluated. Bilateral normal sized kidneys with increased parenchymal echogenicity, may reflect medical renal disease. Clinically correlate. No hydronephrosis. US CAROTID ARTERY BILATERAL    Result Date: 8/23/2021  Carotid Ultrasound Examination Clinical information:  Stenosis Findings Grayscale and color Doppler ultrasound examination of the carotid and vertebral artery systems bilaterally. Maximum peak systolic velocity (PSV) / end diastolic velocity (EDV) measurements were obtained. FINDINGS: Study limited secondary to patient altered mental status, patient motion, and vascular tortuosity. Right Carotid System Right Common Carotid Artery (RCCA): 1:15  cm/s. Right Internal Carotid Artery (DEENA): 305  cm/s. Right External Carotid Artery (RECA): 166 cm/s. Right Vertebral Artery (RVA): Antegrade flow. Right ICA/CCA ratio: 2.57. Calcification, right carotid bifurcation and carotid bulb. Left Carotid System Left Common Carotid Artery (LCCA): 100 cm/s. Left Internal Carotid Artery (LICA): 727 cm/s. Left External Carotid Artery (LECA): 141 cm/s. Left Vertebral Artery (LVA): Antegrade flow. Left ICA/CCA ratio: 4.06. Calcified plaque left carotid bifurcation and left carotid bulb. Impression 50-75% stenosis, right internal carotid artery. Greater than 75% stenosis left internal carotid artery.  Validated velocity measurements with angiographic measurements, velocity criteria are extrapolated from diameter data as defined by the Society of Radiologist in [facility] 2003; [id number];[id number]\". IMPRESSION AND SUGGESTION:  1. Acute encephalopathy, resolved  2. Acute on chronic hypercapnic respiratory failure improved  3. Chronic kidney disease  4. A. fib with controlled rate    Continue O2 to keep saturation 90% overall. BiPAP while asleep and as needed. Procalcitonin is slightly elevated. No leukocytosis. He is on IV Zosyn, chest x-ray shows changes of CHF. Bibasilar atelectasis and COPD. If remain afebrile then change antibiotic to p.o. Augmentin. I spent more than 35 min with this patient's care , greater the 50% of this time was spent in counseling and/or coordinating of care. NOTE: This report was transcribed using voice recognition software. Every effort was made to ensure accuracy; however, inadvertent computerized transcription errors may be present.       Electronically signed by Taylor Honeycutt MD, FCCP on 8/25/2021 at 10:01 AM

## 2021-08-25 NOTE — PROGRESS NOTES
insulin lispro (HUMALOG) injection vial 0-12 Units  0-12 Units Subcutaneous TID WC Amrla Minion, APRN - CNP   8 Units at 08/25/21 1136    insulin lispro (HUMALOG) injection vial 0-6 Units  0-6 Units Subcutaneous Nightly Marla Minion, APRN - CNP   3 Units at 08/24/21 2109    glucose (GLUTOSE) 40 % oral gel 15 g  15 g Oral PRN Stockton Minion, APRN - CNP        dextrose 50 % IV solution  12.5 g IntraVENous PRN Stockton Minion, APRN - CNP        glucagon (rDNA) injection 1 mg  1 mg Intramuscular PRN Stockton Minion, APRN - CNP        dextrose 5 % solution  100 mL/hr IntraVENous PRN Marla Minion, APRN - CNP        insulin glargine (LANTUS) injection vial 40 Units  40 Units Subcutaneous Nightly Rosi Montiel, APRN - CNP   40 Units at 08/24/21 2109    piperacillin-tazobactam (ZOSYN) 3,375 mg in dextrose 5 % 50 mL IVPB extended infusion (mini-bag)  3,375 mg IntraVENous Q8H Jose Schmitt MD   Stopped at 08/25/21 1206    naloxone (NARCAN) injection 0.4 mg  0.4 mg IntraVENous PRN Jose Schmitt MD        dextrose 50 % IV solution  25 g IntraVENous PRN Jose Schmitt MD   25 g at 08/22/21 1414       PHYSICAL EXAM:    BP (!) 131/49   Pulse 117   Temp 99.4 °F (37.4 °C) (Oral)   Resp 20   Ht 5' 10\" (1.778 m)   Wt 211 lb 3.2 oz (95.8 kg)   SpO2 96%   BMI 30.30 kg/m²    General Appearance:      Skin:  normal  CVS - Normal sounds, No murmurs , No carotid Bruits  RS -CTA  Abdomen Soft, BS present  Review of Systems   Constitutional: Negative for appetite change and fever. HENT: Positive for hearing loss. Negative for trouble swallowing. Eyes: Negative for visual disturbance. Respiratory: Negative for choking, shortness of breath and wheezing. Cardiovascular: Negative for chest pain and palpitations. Gastrointestinal: Negative for nausea and vomiting. Musculoskeletal: Positive for gait problem.  Negative for back pain, joint swelling, myalgias, neck pain and neck stiffness. Skin: Negative for color change. Neurological: Positive for weakness (  Generalized). Negative for dizziness, tremors, seizures, syncope, facial asymmetry, speech difficulty, light-headedness, numbness and headaches. Psychiatric/Behavioral: Positive for confusion. Negative for agitation, behavioral problems, hallucinations and sleep disturbance.       Mental Status Exam:             Level of Alertness:   awake            Orientation:   person, place, time                      Attention/Concentration:  normal            Language:  normal      Funduscopic Exam:     Cranial Nerves        Cranial nerve II           Visual acuity:  normal           Visual fields:  normal      Cranial nerve III           Pupils:  equal, round, reactive to light      Cranial nerves III, IV, VI           Extraocular Movements: intact      Cranial nerve V           Facial sensation:  intact      Cranial nerve VII           Facial strength: intact      Cranial nerve VIII           Hearing:  intact      Cranial nerve IX           Palate:  intact      Cranial nerve XI         Shoulder shrug:  intact      Cranial nerve XII          Tongue movement:  normal    Motor:    Drift:  absent  Motor exam is symmetrical 4 out of 5 all extremities bilaterally  Tone:  normal  Abnormal Movements:  absent            Sensory:        Pinprick             Right Upper Extremity:  normal             Left Upper Extremity:  normal             Right Lower Extremity:  normal             Left Lower Extremity:  normal           Vibration                         Touch            Proprioception                 Coordination:           Finger/Nose   Right:  normal              Left:  normal          Heel-Knee-Shin                Right:  normal              Left:  normal          Rapid Alternating Movements              Right:  normal              Left:  normal          Gait:                       Casual:  deffered Reflexes: Deep Tendon Reflexes:             Reflexes are absent             Plantar response:                Right:  downgoing               Left:  downgoing    Vascular:  Cardiac Exam:  HR irreg irreg       XR FEMUR RIGHT (MIN 2 VIEWS)    Result Date: 8/22/2021  EXAMINATION: XR FEMUR RIGHT (MIN 2 VIEWS) DATE AND TIME:8/21/2021 10:39 PM CLINICAL HISTORY: Acute right femur pain  PAIN  COMPARISON:None  FINDINGS:Views of the right femur reveal the bones to be intact and, free of fracture or dislocation. There are moderate degenerative changes noted. IMPRESSION: No acute osseous abnormality of the right femur    CT HEAD WO CONTRAST    Result Date: 8/23/2021  EXAMINATION: CT HEAD WO CONTRAST CLINICAL HISTORY: resp failure COMPARISON:  NONE AVAILABLE An unenhanced scan is performed. FINDINGS:   There is no bleed, mass effect, or space occupying lesion. No extra-axial mass or fluid collections. Global appropriate senescent changes of aging noted. No acute process in the brain. Opacification of the mastoid air cells consistent with chronic mastoiditis. NO ACUTE PROCESS IN THE BRAIN. CHRONIC BILATERAL MASTOIDITIS All CT scans at this facility use dose modulation, iterative reconstruction, and/or weight based dosing when appropriate to reduce radiation dose to as low as reasonably achievable. XR CHEST PORTABLE    Result Date: 8/22/2021  EXAMINATION: CHEST PORTABLE VIEW  CLINICAL HISTORY: Respiratory failure. COMPARISONS: April 20, 2021  FINDINGS: 2 views of the chest is submitted. The cardiac silhouette is enlarged Pulmonary vascular appears congested. Right sided trachea. Areas of atelectasis, patchy infiltrate in the bases left greater than right. No Pneumothoraces. PULMONARY VASCULAR IS CONGESTED WHICH COULD SUGGEST COMPONENT OF CHF SUPERIMPOSED UPON COPD.  AREAS OF ATELECTASIS, PATCHY INFILTRATES IN THE BASES LEFT GREATER THAN RIGHT    US DUP LOWER EXTREMITY RIGHT DANICA    Result Date: 8/22/2021  US DUP LOWER EXTREMITY RIGHT VEIN CLINICAL HISTORY:  LEG/THIGH PAIN COMPARISONS: FINDINGS: Grayscale as well as Duplex color and Spectra; Doppler ultrasound of the deep venous system of the right lower extremity from the inguinal ligament to the popliteal fossa was performed. There are no findings of deep venous thrombus in the visualized vessels of the right lower extremity. NO FINDINGS OF DEEP VENOUS THROMBUS IN  THE VISUALIZED VESSELS OF THE RIGHT LOWER EXTREMITY. Recent Labs     08/23/21 0322 08/23/21  0809 08/23/21  1035 08/24/21  0504 08/25/21  0635   WBC 7.8  --   --  7.2 8.5   HGB 9.3*   < > 10.0* 9.7* 9.6*     --   --  186 208    < > = values in this interval not displayed. Recent Labs     08/23/21 2013 08/24/21  0504 08/25/21  0635   * 144 145*   K 5.2* 4.5 4.4    103 101   CO2 32* 33* 33*   BUN 48* 48* 46*   CREATININE 2.29* 2.17* 2.22*   GLUCOSE 219* 194* 174*     Recent Labs     08/23/21  0322 08/24/21  0504 08/25/21  0635   BILITOT 0.5 0.6 0.7   ALKPHOS 160* 167* 176*   AST 77* 58* 53*   ALT 44* 43* 49*     Lab Results   Component Value Date    PROTIME 10.2 09/27/2018    INR 1.0 09/27/2018     No results found for: LITHIUM, DILFRTOT, VALPROATE    ASSESSMENT AND PLAN    Encephalopathy/ TIA,   no focalsymptoms,   CT x 2 negative ,   bilateral Carotid stenosis, will need CTA, though increased creatinine, so will do MRA. General weakness, areflexic    Acute hypoxic and hypercapnic respiratory failure with acute hypoxic encephalopathy, improved  Sleep apnea, BiPAP  CT the head negative x2   negative MRA of the neck  MRA of the head without any significant stenosis, aneurysm or occlusion. Paroxysmal atrial fibrillation, it appears oral anticoagulation is still on hold. Okay to resume per Dr. Dominga Canales note on 8/23/2021.   Patient with multiple risk factors for CVD including diabetes, hypertension hyperlipidemia and will need ongoing attention to risk factor modification. Will follow at a distance    I have personally performed a face to face diagnostic evaluation on this patient, reviewed all data and investigations, and am the sole provider of all clinical decisions on the neurological status of this patient. as noted above, encephalopathy, hypoxemia      Chance Garcias MD, Tara Cooley, American Board of Psychiatry & Neurology  Board Certified in Vascular Neurology  Board Certified in Neuromuscular Medicine  Certified in . [place] [address]

## 2021-08-25 NOTE — FLOWSHEET NOTE
Medicated with 50mg PO tramadol for continued complaints of all over aches/pain rated 8/10. No signs of any acute distress noted. Call light remains in reach.  Electronically signed by Carlton Krishnan RN on 8/25/2021 at 12:31 PM

## 2021-08-25 NOTE — PROGRESS NOTES
Department of Internal Medicine  General Internal Medicine  Attending Progress Note      SUBJECTIVE:  Pt seen and examined. Awake and alert. Hard of hearing. Complaining of pain not relieved with tylenol. On Lasix drip with good UOP.  PT/OT recommending SNF    OBJECTIVE      Medications    Current Facility-Administered Medications: traMADol (ULTRAM) tablet 50 mg, 50 mg, Oral, Q6H PRN  atorvastatin (LIPITOR) tablet 80 mg, 80 mg, Oral, Daily  apixaban (ELIQUIS) tablet 2.5 mg, 2.5 mg, Oral, BID  aspirin chewable tablet 81 mg, 81 mg, Oral, Daily  doxazosin (CARDURA) tablet 2 mg, 2 mg, Oral, Daily  ferrous sulfate (IRON 325) tablet 325 mg, 325 mg, Oral, BID WC  hydrALAZINE (APRESOLINE) tablet 100 mg, 100 mg, Oral, BID  isosorbide mononitrate (IMDUR) extended release tablet 60 mg, 60 mg, Oral, Daily  carvedilol (COREG) tablet 6.25 mg, 6.25 mg, Oral, BID WC  ammonium lactate (LAC-HYDRIN) 12 % lotion, , Topical, BID  furosemide (LASIX) 100 mg in dextrose 5 % 100 mL infusion, 10 mg/hr, IntraVENous, Continuous  sodium chloride flush 0.9 % injection 5-40 mL, 5-40 mL, IntraVENous, 2 times per day  sodium chloride flush 0.9 % injection 5-40 mL, 5-40 mL, IntraVENous, PRN  0.9 % sodium chloride infusion, 25 mL, IntraVENous, PRN  ondansetron (ZOFRAN-ODT) disintegrating tablet 4 mg, 4 mg, Oral, Q8H PRN **OR** ondansetron (ZOFRAN) injection 4 mg, 4 mg, IntraVENous, Q6H PRN  acetaminophen (TYLENOL) tablet 650 mg, 650 mg, Oral, Q6H PRN **OR** acetaminophen (TYLENOL) suppository 650 mg, 650 mg, Rectal, Q6H PRN  insulin lispro (HUMALOG) injection vial 0-12 Units, 0-12 Units, Subcutaneous, TID WC  insulin lispro (HUMALOG) injection vial 0-6 Units, 0-6 Units, Subcutaneous, Nightly  glucose (GLUTOSE) 40 % oral gel 15 g, 15 g, Oral, PRN  dextrose 50 % IV solution, 12.5 g, IntraVENous, PRN  glucagon (rDNA) injection 1 mg, 1 mg, Intramuscular, PRN  dextrose 5 % solution, 100 mL/hr, IntraVENous, PRN  insulin glargine (LANTUS) injection vial 40 Units, 40 Units, Subcutaneous, Nightly  piperacillin-tazobactam (ZOSYN) 3,375 mg in dextrose 5 % 50 mL IVPB extended infusion (mini-bag), 3,375 mg, IntraVENous, Q8H  naloxone (NARCAN) injection 0.4 mg, 0.4 mg, IntraVENous, PRN  dextrose 50 % IV solution, 25 g, IntraVENous, PRN  Physical    VITALS:  /88   Pulse 85   Temp 99 °F (37.2 °C) (Oral)   Resp 20   Ht 5' 10\" (1.778 m)   Wt 211 lb 3.2 oz (95.8 kg)   SpO2 95%   BMI 30.30 kg/m²   Constitutional: Awake and alert in no acute distress. Lying in bed comfortably  Head: Normocephalic, atraumatic  Eyes: EOMI, PERRLA  ENT: moist mucous membranes  Neck: neck supple, trachea midline  Lungs: Good inspiratory effort, no wheeze, no rhonchi, no rales  Heart: RRR, normal S1 and S2  GI: Soft, non-distended, non tender, no guarding, no rebound, +BS  MSK: trace edema noted  Skin: warm, dry, bilateral heel wounds and open wound on left shin with oozing     Data    CBC:   Lab Results   Component Value Date    WBC 8.5 08/25/2021    RBC 3.36 08/25/2021    RBC 4.92 03/01/2012    HGB 9.6 08/25/2021    HCT 30.5 08/25/2021    MCV 90.7 08/25/2021    MCH 28.6 08/25/2021    MCHC 31.6 08/25/2021    RDW 15.1 08/25/2021     08/25/2021    MPV 9.4 10/16/2015     CMP:    Lab Results   Component Value Date     08/25/2021    K 4.4 08/25/2021     08/25/2021    CO2 33 08/25/2021    BUN 46 08/25/2021    CREATININE 2.22 08/25/2021    GFRAA 33.9 08/25/2021    LABGLOM 28.0 08/25/2021    GLUCOSE 174 08/25/2021    GLUCOSE 55 12/18/2019    PROT 5.7 08/25/2021    LABALBU 2.9 08/25/2021    LABALBU 4.3 03/01/2012    CALCIUM 8.6 08/25/2021    BILITOT 0.7 08/25/2021    ALKPHOS 176 08/25/2021    AST 53 08/25/2021    ALT 49 08/25/2021       ASSESSMENT AND PLAN      # Acute hypoxic and hypercapnic respiratory failure with acute metabolic encephalopathy  - initially placed on BIPAP and transferred to the ICU on 8/22  - ABG improved on BIPAP.  Encephalopathy improved  - intensivist consulted  - now on nasal cannula. Continue BIPAP as needed  - empiric zosyn  - neurology consulted  - CT head negative  - PT/OT - recommending SNF    # BART on CKD4 with hyperkalemia  - nephrology consulted - sp kayexelate, lokelma  - on lasix drip. Monitor UOP  - monitor electrolytes closely    # PAF  - resumed home coreg. Monitor on tele  - cont home meds    # IDDM  - ISS, lantus    DVT: lovenox    Disposition: Pt much improved today. On nasal cannula and lasix drip. Monitoring renal function and potassium closely. [facility] at discharge.       Adam Navarro,   Internal Medicine

## 2021-08-25 NOTE — PROGRESS NOTES
Renal Progress Note    Assessment and Plan:      49-year-old man with CKD stage IV with risk factors of diabetes mellitus hypertension coronary artery disease. Never had to see nephrology before. Admitted with hyperkalemia potassium of 6.8 some worsening of his underlying chronic kidney disease, significant fluid overload as well as hypercarbic respiratory failure. On torsemide 40 daily and cozaar 50 mg at home. He has significant fluid overload. Renal u/s ok. Treated with lokelma initially. Also iv diuretics.       Plan/  1-keep off losartan for now  2. Change lasix drip to 40 mg torsemide  3. Will need f/u on d/c    Patient Active Problem List:     Hyperlipidemia     Hypertension     CHF (congestive heart failure), NYHA class I, acute on chronic, combined ([facility]     CKD stage 3 due to type 2 diabetes mellitus ([facility]     Coronary artery disease involving native coronary artery of native heart without angina pectoris     Atrial fibrillation (HCC)     Essential hypertension     DM2 (diabetes mellitus, type 2) (HCC)     Leg wound, left, sequela     Non-pressure chronic ulcer of other part of right lower leg with fat layer exposed ([address]     Chronic venous hypertension (idiopathic) with ulcer and inflammation of right lower extremity (CODE) (HCC)     Venous insufficiency of both lower extremities     Hyperkalemia     Right thigh pain     Acute on chronic respiratory failure with hypercapnia (HCC)      Subjective:   Admit Date: 8/21/2021    Interval History: very good uo.  k ok.   Transferred to [facility]      Medications:   Scheduled Meds:   atorvastatin  80 mg Oral Daily    apixaban  2.5 mg Oral BID    aspirin  81 mg Oral Daily    doxazosin  2 mg Oral Daily    ferrous sulfate  325 mg Oral BID WC    hydrALAZINE  100 mg Oral BID    isosorbide mononitrate  60 mg Oral Daily    carvedilol  6.25 mg Oral BID WC    ammonium lactate   Topical BID    sodium chloride flush  5-40 mL IntraVENous 2 times per day    insulin lispro  0-12 Units Subcutaneous TID WC    insulin lispro  0-6 Units Subcutaneous Nightly    insulin glargine  40 Units Subcutaneous Nightly    piperacillin-tazobactam  3,375 mg IntraVENous Q8H     Continuous Infusions:   furosemide (LASIX) 1mg/ml infusion 10 mg/hr (08/24/21 1521)    sodium chloride      dextrose         CBC:   Recent Labs     08/24/21  0504 08/25/21  0635   WBC 7.2 8.5   HGB 9.7* 9.6*    208     CMP:    Recent Labs     08/23/21 2013 08/24/21  0504 08/25/21  0635   * 144 145*   K 5.2* 4.5 4.4    103 101   CO2 32* 33* 33*   BUN 48* 48* 46*   CREATININE 2.29* 2.17* 2.22*   GLUCOSE 219* 194* 174*   CALCIUM 9.4 8.6 8.6   LABGLOM 27.0* 28.7* 28.0*     Troponin:   No results for input(s): TROPONINI in the last 72 hours. BNP: No results for input(s): BNP in the last 72 hours. INR: No results for input(s): INR in the last 72 hours. Lipids:   Recent Labs     08/24/21  0504   HDL 32*     Liver:   Recent Labs     08/25/21  0635   AST 53*   ALT 49*   ALKPHOS 176*   PROT 5.7*   LABALBU 2.9*   BILITOT 0.7     Iron:  No results for input(s): IRONS, FERRITIN in the last 72 hours. Invalid input(s): LABIRONS  Urinalysis: No results for input(s): UA in the last 72 hours. Objective:   Vitals: /88   Pulse 85   Temp 99 °F (37.2 °C) (Oral)   Resp 20   Ht 5' 10\" (1.778 m)   Wt 211 lb 3.2 oz (95.8 kg)   SpO2 95%   BMI 30.30 kg/m²    Wt Readings from Last 3 Encounters:   08/23/21 211 lb 3.2 oz (95.8 kg)   08/12/21 205 lb (93 kg)   06/23/20 200 lb (90.7 kg)      24HR INTAKE/OUTPUT:      Intake/Output Summary (Last 24 hours) at 8/25/2021 1617  Last data filed at 8/25/2021 4282  Gross per 24 hour   Intake 480 ml   Output 2900 ml   Net -2420 ml       Constitutional:  Alert, awake, no apparent distress   Skin:normal, no rash  HEENT:sclera anicteric.   Head atraumatic normocephalic  Neck:supple with no thyromegally  Cardiovascular:  S1, S2 without m/r/g   Respiratory:  CTA B without w/r/r   Abdomen: +bs, soft, nt  Ext: 2+ LE edema  Musculoskeletal:Intact  Neuro:Alert and oriented with no deficit      Electronically signed by Scott Lopez MD on 8/25/2021 at 4:17 PM

## 2021-08-25 NOTE — FLOWSHEET NOTE
AM assessment completed. Patient resting in bed at this time. Complains of all over aches/pain. Denies any chest pain. Remains atrial fibrillation on the monitor. Trace non-pitting edema noted to all extremities. Pedal pulses weak but palpable. Shortness of breath noted with exertion. Lungs are diminished bilaterally. SATS 96% on 3L NC. He is A/Ox2-3, unsure of time. He is also very hard of hearing bilaterally. He is a 2 person assist turn in the bed. Denies any pain with elimination. Mackay catheter to gravity drainage with dark clear yellow urine noted. Skin remains warm and dry. Redness noted to bilateral lower extremities as well as to bilateral groin area. Bruising noted to bilateral upper extremities. #20g SL to left forearm, flushed and patent. #20g SL to right forearm with lasix gtt infusing at 10ml/hr. Vital signs stable and AM medications provided. Also medicated with 650mg PO tylenol for the above complaint of all over aches/pain rated 8/10. No signs of any acute distress noted. Call light remains in reach.  Electronically signed by Sathya Hughes RN on 8/25/2021 at 12:19 PM

## 2021-08-25 NOTE — PROGRESS NOTES
[facility] OCCUPATIONAL THERAPY EVALUATION - ACUTE     NAME: Jenni Aburto  : 1931 (28 y.o.)  MRN: [id number]  CODE STATUS: Full Code  Room: 29N267-33    Date of Service: 2021    Patient Diagnosis(es): Hyperkalemia [E87.5]  Right leg pain [M79.604]  BART (acute kidney injury) [facility]) [N17.9]   Chief Complaint   Patient presents with    Leg Pain     right upper leg     Patient Active Problem List    Diagnosis Date Noted    CKD stage 3 due to type 2 diabetes mellitus ([facility] [facility] 2018    Coronary artery disease involving native coronary artery of native heart without angina pectoris 2018    Atrial fibrillation ([address] 2018    Essential hypertension 2018    DM2 (diabetes mellitus, type 2) ([address] 2018    Acute on chronic respiratory failure with hypercapnia (HCC)     Hyperkalemia 2021    Right thigh pain 2021    Hypertension 2020    Venous insufficiency of both lower extremities 2020    Non-pressure chronic ulcer of other part of right lower leg with fat layer exposed ([address] 2020    Chronic venous hypertension (idiopathic) with ulcer and inflammation of right lower extremity (CODE) ([facility] [facility] 2020    Leg wound, left, sequela 2019    CHF (congestive heart failure), NYHA class I, acute on chronic, combined ([address] 2018    Hyperlipidemia         Past Medical History:   Diagnosis Date    Anemia     Atrial fibrillation ([address]     CAD (coronary artery disease)     Hyperlipidemia     Hypertension     Type II or unspecified type diabetes mellitus without mention of complication, not stated as uncontrolled      Past Surgical History:   Procedure Laterality Date    CATARACT REMOVAL WITH IMPLANT Right      - cathy walters    FOOT FRACTURE SURGERY  1980s    work related - fell off a ladder    PTCA      4 stents - jill walters    ROTATOR CUFF REPAIR Right         Restrictions:Fall,IV,Catheter        Safety Devices: Safety Devices  Safety Devices in place: Yes  Type of devices: All fall risk precautions in place   Initially in place: No    Subjective:Pt cooperative. Pt requires increased time to respond secondary to [facility] Pain Reassessment: 10/10 bilateral shoulders at onset and 8/10 bilateral shoulders at end of session. Nursing informed. Prior Level of Function:  Social/Functional History  Lives With: Spouse  Type of Home: House  Home Layout: One level  Home Access: Stairs to enter without rails  Entrance Stairs - Number of Steps: 1  Bathroom Shower/Tub: Walk-in shower  Bathroom Equipment: Shower chair, Grab bars in shower, Hand-held shower  Home Equipment: Rolling walker  ADL Assistance: Independent  Homemaking Responsibilities: No  Ambulation Assistance: Independent (with ww)  Transfer Assistance: Independent  Active : No  Additional Comments: no falls - left handed    OBJECTIVE:     Orientation Status:  Orientation  Overall Orientation Status: Within Functional Limits    Observation:  Observation/Palpation  Posture: Fair  Observation: flexed posture.   Multiple skin abrasions  Edema: Mild    Cognition Status:  Cognition  Cognition Comment: Pt follows one step commands consistently with repetition PRN    Perception Status:  Perception  Overall Perceptual Status: WFL    Sensation Status:  Sensation  Overall Sensation Status: WFL    Vision and Hearing Status:  Vision  Vision: Impaired  Vision Exceptions: Wears glasses for reading  Hearing  Hearing: Exceptions to [facility]  Hearing Exceptions: Hard of hearing/hearing concerns, Left hearing aid     ROM:   LUE AROM (degrees)  LUE General AROM: limited shoulder all planes  Left Hand AROM (degrees)  Left Hand AROM: WFL  RUE AROM (degrees)  RUE General AROM: limited shoulder all planes  Right Hand AROM (degrees)  Right Hand AROM: WFL    Strength:  LUE Strength  L Hand General: 4+/5  LUE Strength Comment: shoulder 2/5  RUE Strength  R Hand General: 4+/5  RUE Strength Comment: shoulder 2/5    Coordination, Tone, Quality of Movement: Tone RUE  RUE Tone: Normotonic  Tone LUE  LUE Tone: Normotonic  Coordination  Movements Are Fluid And Coordinated: No  Coordination and Movement description: Decreased speed, Right UE, Left UE    Hand Dominance:  Hand Dominance  Hand Dominance: Left    ADL Status:  ADL  Feeding: Unable to assess(comment)  Grooming: Minimal assistance  UE Bathing: Moderate assistance  LE Bathing: Moderate assistance  UE Dressing:  Moderate assistance  LE Dressing: Maximum assistance  Toileting: Unable to assess(comment)          Therapy key for assistance levels -   Independent = Pt. is able to perform task with no assistance but may require a device   Stand by assistance = Pt. does not perform task at an independent level but does not need physical assistance, requires verbal cues  Minimal, Moderate, Maximal Assistance = Pt. requires physical assistance (25%, 50%, 75% assist from helper) for task but is able to actively participate in task   Dependent = Pt. requires total assistance with task and is not able to actively participate with task completion     Functional Mobility:     Transfers  Sit to stand: 2 Person assistance, Moderate assistance  Stand to sit: 2 Person assistance, Moderate assistance    Bed Mobility  Bed mobility  Supine to Sit: 2 Person assistance, Moderate assistance  Sit to Supine: 2 Person assistance, Moderate assistance    Seated and Standing Balance:  Balance  Sitting Balance: Supervision  Standing Balance: Contact guard assistance    Functional Endurance:  Activity Tolerance  Activity Tolerance: Patient limited by fatigue, Treatment limited secondary to medical complications (free text)    D/C Recommendations:  OT D/C RECOMMENDATIONS  REQUIRES OT FOLLOW UP: Yes    Equipment Recommendations:       OT Education:        OT Follow Up:  OT D/C RECOMMENDATIONS  REQUIRES OT FOLLOW UP: Yes       Assessment/Discharge Disposition:     Performance

## 2021-08-26 NOTE — PROGRESS NOTES
assistance;2 Person assistance  Comment: HOB slightly elevated - bed rails utilized - increased time and effort required    Activity Tolerance:  Activity Tolerance: Patient Tolerated treatment well    Treatment Consisted Of:  ADL Training    Plan:  Continue OT per POC    Goals/Plan Addressed This session:  Improve ADL [place]    Minutes:    Time In: 1325  Time Out: 2564  Minutes: 26    ADL/IADL trainin minutes    Electronically signed by:    MONA Camacho  2021, 3:04 PM

## 2021-08-26 NOTE — PROGRESS NOTES
INPATIENT PROGRESS NOTES    PATIENT NAME: Polina Ybarra  MRN: [id number]  SERVICE DATE:  August 26, 2021   SERVICE TIME:  10:41 AM      PRIMARY SERVICE: Pulmonary Disease    CHIEF COMPLAIN: Hypercapnic respiratory failure      INTERVAL HPI: Patient seen and examined at bedside, Interval Notes, orders reviewed. Nursing notes noted  Patient is alert awake. Very hard of hearing. He is going to [facility] for rehab. He is on 3 L O2 via nasal cannula and his O2 saturation is 96%. No shortness of breath. No fever or chills. No chest pain. No nausea vomiting diarrhea. He is comfortable in bed. He is on BiPAP at night. OBJECTIVE    Body mass index is 30.3 kg/m². PHYSICAL EXAM:  Vitals:  /64   Pulse 92   Temp 98.1 °F (36.7 °C) (Oral)   Resp 22   Ht 5' 10\" (1.778 m)   Wt 211 lb 3.2 oz (95.8 kg)   SpO2 98%   BMI 30.30 kg/m²   General: Alert, awake . comfortable in bed, No distress. Hard of hearing  Head: Atraumatic , Normocephalic   Eyes: PERRL. No sclera icterus. No conjunctival injection. No discharge   ENT: No nasal  discharge. Pharynx clear. Neck:  Trachea midline. No thyromegaly, no JVD, No cervical adenopathy. Chest : Bilaterally symmetrical ,Normal effort,  No accessory muscle use  Lung : . Fair BS bilateral, decreased BS at bases. No Rales. No wheezing. No rhonchi. Heart[de-identified] Normal  rate. Regular rhythm. No mumur ,  Rub or gallop  ABD: Mild tenderness. No masses. No organmegaly. Normal bowel sounds. No hernia.   Ext : No Pitting both leg , No Cyanosis No clubbing  Neuro: Alert awake, no focal weakness          DATA:   Recent Labs     08/25/21  0635 08/26/21  0821   WBC 8.5 7.3   HGB 9.6* 9.6*   HCT 30.5* 30.1*   MCV 90.7 90.8    173     Recent Labs     08/25/21  0635 08/25/21  0635 08/26/21  0821   *  --  142   K 4.4  --  4.3     --  99   CO2 33*  --  35*   BUN 46*  --  47*   CREATININE 2.22*  --  2.52*   GLUCOSE 174*  --  130*   CALCIUM 8.6  --  8.3*   PROT 5.7*  --  5.5* LABALBU 2.9*  --  2.7*   BILITOT 0.7  --  0.7   ALKPHOS 176*  --  161*   AST 53*  --  52*   ALT 49*   < > 45*   LABGLOM 28.0*   < > 24.2*   GFRAA 33.9*   < > 29.3*   GLOB 2.8   < > 2.8    < > = values in this interval not displayed. MV Settings:     Rate Set: 16 bmp  FiO2 : 40 %  PEEP/CPAP: 6  Pressure Support: 0 cmH20  Peak Inspiratory Pressure: 17 cmH2O    No results for input(s): PHART, QTB1GCS, PO2ART, PYW9JEJ, BEART, D4ARJOVC in the last 72 hours. O2 Device: Nasal cannula  O2 Flow Rate (L/min): 3 L/min    ADULT DIET; Regular; 4 carb choices (60 gm/meal); Low Potassium (Less than 3000 mg/day); Low Phosphorus (Less than 1000 mg)     MEDICATIONS during current hospitalization:    Continuous Infusions:   sodium chloride      dextrose         Scheduled Meds:   atorvastatin  80 mg Oral Daily    apixaban  2.5 mg Oral BID    aspirin  81 mg Oral Daily    doxazosin  2 mg Oral Daily    ferrous sulfate  325 mg Oral BID WC    hydrALAZINE  100 mg Oral BID    isosorbide mononitrate  60 mg Oral Daily    torsemide  40 mg Oral Daily    carvedilol  6.25 mg Oral BID WC    ammonium lactate   Topical BID    sodium chloride flush  5-40 mL IntraVENous 2 times per day    insulin lispro  0-12 Units Subcutaneous TID WC    insulin lispro  0-6 Units Subcutaneous Nightly    insulin glargine  40 Units Subcutaneous Nightly    piperacillin-tazobactam  3,375 mg IntraVENous Q8H       PRN Meds:traMADol, sodium chloride flush, sodium chloride, ondansetron **OR** ondansetron, acetaminophen **OR** acetaminophen, glucose, dextrose, glucagon (rDNA), dextrose, naloxone, dextrose    Radiology  XR FEMUR RIGHT (MIN 2 VIEWS)    Result Date: 8/22/2021  EXAMINATION: XR FEMUR RIGHT (MIN 2 VIEWS) DATE AND TIME:8/21/2021 10:39 PM CLINICAL HISTORY: Acute right femur pain  PAIN  COMPARISON:None  FINDINGS:Views of the right femur reveal the bones to be intact and, free of fracture or dislocation.  There are moderate degenerative changes noted.      IMPRESSION: No acute osseous abnormality of the right femur    CT HEAD WO CONTRAST    Result Date: 8/23/2021  CT Brain. Contrast medium:  without contrast.. History: Altered mental status. Technical factors: CT imaging of the brain was obtained and formatted as 5 mm contiguous axial images. 2.5 mm contiguous axial images were obtained through the osseous structures. Sagittal and coronal reconstruction obtained during postprocessing. Comparison:  CT brain, August 22, 2021. Findings: Extra-axial spaces:  Normal. Intracranial hemorrhage:  None. Ventricular system: Ventricles mildly enlarged. Sulci mildly prominent. Basal Cisterns:  Normal. Cerebral Parenchyma: Bilateral symmetric periventricular areas decreased attenuation. Midline Shift:  None. Cerebellum:  Normal. Paranasal sinuses and mastoid air cells: Opacification bilateral mastoid air cells again identified, unchanged. Visualized Orbits: Remote right ocular surgery. Impression: Chronic bilateral mastoiditis. Mild cerebral atrophy. Chronic ischemic white matter disease. All CT scans at this facility use dose modulation, iterative reconstruction, and/or weight based dosing when appropriate to reduce radiation dose to as low as reasonably achievable. CT HEAD WO CONTRAST    Result Date: 8/23/2021  EXAMINATION: CT HEAD WO CONTRAST CLINICAL HISTORY: resp failure COMPARISON:  NONE AVAILABLE An unenhanced scan is performed. FINDINGS:   There is no bleed, mass effect, or space occupying lesion. No extra-axial mass or fluid collections. Global appropriate senescent changes of aging noted. No acute process in the brain. Opacification of the mastoid air cells consistent with chronic mastoiditis. NO ACUTE PROCESS IN THE BRAIN. CHRONIC BILATERAL MASTOIDITIS All CT scans at this facility use dose modulation, iterative reconstruction, and/or weight based dosing when appropriate to reduce radiation dose to as low as reasonably achievable.     XR CHEST PORTABLE    Result Date: 8/22/2021  EXAMINATION: CHEST PORTABLE VIEW  CLINICAL HISTORY: Respiratory failure. COMPARISONS: April 20, 2021  FINDINGS: 2 views of the chest is submitted. The cardiac silhouette is enlarged Pulmonary vascular appears congested. Right sided trachea. Areas of atelectasis, patchy infiltrate in the bases left greater than right. No Pneumothoraces. PULMONARY VASCULAR IS CONGESTED WHICH COULD SUGGEST COMPONENT OF CHF SUPERIMPOSED UPON COPD. AREAS OF ATELECTASIS, PATCHY INFILTRATES IN THE BASES LEFT GREATER THAN RIGHT    US DUP LOWER EXTREMITY RIGHT DANICA    Result Date: 8/22/2021  US DUP LOWER EXTREMITY RIGHT VEIN CLINICAL HISTORY:  LEG/THIGH PAIN COMPARISONS: FINDINGS: Grayscale as well as Duplex color and Spectra; Doppler ultrasound of the deep venous system of the right lower extremity from the inguinal ligament to the popliteal fossa was performed. There are no findings of deep venous thrombus in the visualized vessels of the right lower extremity. NO FINDINGS OF DEEP VENOUS THROMBUS IN  THE VISUALIZED VESSELS OF THE RIGHT LOWER EXTREMITY. US DUP LOWER ART/BYPASS GRAFTS BILATERAL COMPLETE    Result Date: 8/25/2021  EXAMINATION: US DUP LOWER ART/BYPASS GRAFTS BILATERAL COMPLETE DATE AND TIME:8/24/2021 4:18 PM CLINICAL HISTORY: E87.5 Hyperkalemia ICD10   claudication BLE  COMPARISON: None Technique: The following arteries were evaluated both legs: CFA, SFA, popliteal artery and a few of the runoff vessels. The measurements of the peak systolic velocities at the various levels of these arteries are noted in the graph below. RESULT: RIGHT: Common femoral artery: 138 cm/s with triphasic waveform. Femoral artery proximal: 34 cm/s with biphasic waveform. Femoral artery mid: 59 cm/s with monophasic waveform. Femoral artery distal: 68 cm/s with monophasic waveform.  Popliteal mid: 36 cm/s with monophasic waveform. Anterior tibial artery proximal: Not visualized. Anterior tibial artery mid: Not visualized. Anterior tibial artery distal: Not visualized. Posterior tibial artery proximal: 58 cm/s with monophasic waveform. Posterior tibial artery mid: 42 cm/s with monophasic waveform. Posterior tibial artery distal: 78 cm/s with monophasic waveform. Peroneal artery proximal: 49 cm/s with monophasic waveform. Peroneal artery mid: 37 cm/s with monophasic waveform. Peroneal artery distal: 35 cm/s with monophasic waveform. LEFT: Common femoral artery: 157 cm/s with biphasic waveform. Femoral artery proximal: 119 cm/s with biphasic waveform. Femoral artery mid: 164 cm/s with biphasic waveform. Femoral artery distal: 183 cm/s with biphasic waveform. Popliteal mid: 64 cm/s with biphasic waveform. Anterior tibial artery proximal: Not visualized. Anterior tibial artery mid: Not visualized. Anterior tibial artery distal: Not visualized. Posterior tibial artery proximal: 116 cm/s with monophasic waveform. Posterior tibial artery mid: 120 cm/s with biphasic waveform. Posterior tibial artery distal: 138 cm/s with biphasic waveform. Peroneal artery proximal: 74 cm/s with monophasic waveform. Peroneal artery mid: 80 cm/s with biphasic waveform. Peroneal artery distal: 77 cm/s with biphasic waveform. 1. The peak systolic velocities in the left common femoral artery is 157 cm/s, in the left mid femoral artery is 164 cm/s, in the distal left femoral artery is 183 cm/s, this is suggestive of 30-49% stenosis. 2. There is no significant stenosis in the right lower extremity. However, there is predominantly monophasic flow in the right lower extremity. 3. There is predominantly biphasic and monophasic flow in the left lower extremity.      US RETROPERITONEAL LIMITED    Result Date: 8/24/2021  EXAMINATION:   RENAL ULTRASOUND HISTORY:   acute renal failure / ckd stage 4   E87.5 Hyperkalemia ICD10 TECHNIQUE:  Sonography of the kidneys was performed. Images were obtained and stored in a permanent archive. COMPARISON: None RESULT: Limited examination due to patient body habitus. Right Kidney:      -Renal length: 12.3 cm      -Parenchyma: Parenchyma echogenicity is increased. Normal parenchymal thickness.      -Collecting system: No hydronephrosis. -Calculus: No echogenic, shadowing calculus.      -Lesion:  None. Left Kidney:          -Renal length: 11.5 cm      -Parenchyma: Parenchyma echogenicity is increased. Normal parenchymal thickness.      -Collecting system: No hydronephrosis. -Calculus: No echogenic, shadowing calculus.      -Lesion:  None. Bladder: Not evaluated. Bilateral normal sized kidneys with increased parenchymal echogenicity, may reflect medical renal disease. Clinically correlate. No hydronephrosis. US CAROTID ARTERY BILATERAL    Result Date: 8/23/2021  Carotid Ultrasound Examination Clinical information:  Stenosis Findings Grayscale and color Doppler ultrasound examination of the carotid and vertebral artery systems bilaterally. Maximum peak systolic velocity (PSV) / end diastolic velocity (EDV) measurements were obtained. FINDINGS: Study limited secondary to patient altered mental status, patient motion, and vascular tortuosity. Right Carotid System Right Common Carotid Artery (RCCA): 1:15  cm/s. Right Internal Carotid Artery (DEENA): 305  cm/s. Right External Carotid Artery (RECA): 166 cm/s. Right Vertebral Artery (RVA): Antegrade flow. Right ICA/CCA ratio: 2.57. Calcification, right carotid bifurcation and carotid bulb. Left Carotid System Left Common Carotid Artery (LCCA): 100 cm/s. Left Internal Carotid Artery (LICA): 800 cm/s. Left External Carotid Artery (LECA): 141 cm/s. Left Vertebral Artery (LVA): Antegrade flow. Left ICA/CCA ratio: 4.06. Calcified plaque left carotid bifurcation and left carotid bulb.  Impression 50-75% stenosis, right internal carotid artery. Greater than 75% stenosis left internal carotid artery. Validated velocity measurements with angiographic measurements, velocity criteria are extrapolated from diameter data as defined by the Society of Radiologist in [address] [place] [address] Drive Radiology 2003; 984;[id number]\". IMPRESSION AND SUGGESTION:  1. Acute encephalopathy, resolved  2. Acute on chronic hypercapnic respiratory failure improved  3. Chronic kidney disease  4. A. fib with controlled rate    Continue O2 to keep saturation 90% overall. BiPAP while asleep and as needed. Procalcitonin is slightly elevated. No leukocytosis. He is on IV Zosyn change to p.o. Augmentin. , chest x-ray shows changes of CHF. Bibasilar atelectasis and COPD. Discharge to: [facility]. Discussed with care coordinator. NOTE: This report was transcribed using voice recognition software. Every effort was made to ensure accuracy; however, inadvertent computerized transcription errors may be present.       Electronically signed by Taylor Honeycutt MD, FCCP on 8/26/2021 at 10:41 AM

## 2021-08-26 NOTE — PROGRESS NOTES
Renal Progress Note    Assessment and Plan:      42-year-old man with CKD stage IV with risk factors of diabetes mellitus hypertension coronary artery disease. Never had to see nephrology before. Admitted with hyperkalemia potassium of 6.8 some worsening of his underlying chronic kidney disease, significant fluid overload as well as hypercarbic respiratory failure. On torsemide 40 daily and cozaar 50 mg at home. Wife says he had been off the cozaar but was on bactrim  And cipro. He has significant fluid overload. Renal u/s ok. Treated with lokelma initially. Also iv diuretics. The initial hyperkalemia may have been from bactrim     Plan/  1-keep off losartan / bactrim  2. Change lasix drip to 40 mg torsemide  3. Will need f/u on d/c  4. [id number] [address] [facility] for rehab    Patient Active Problem List:     Hyperlipidemia     Hypertension     CHF (congestive heart failure), NYHA class I, acute on chronic, combined ([address]     CKD stage 3 due to type 2 diabetes mellitus ([address]     Coronary artery disease involving native coronary artery of native heart without angina pectoris     Atrial fibrillation (HCC)     Essential hypertension     DM2 (diabetes mellitus, type 2) ([facility])     Leg wound, left, sequela     Non-pressure chronic ulcer of other part of right lower leg with fat layer exposed ([address]     Chronic venous hypertension (idiopathic) with ulcer and inflammation of right lower extremity (CODE) ([facility])     Venous insufficiency of both lower extremities     Hyperkalemia     Right thigh pain     Acute on chronic respiratory failure with hypercapnia ([address]      Subjective:   Admit Date: 8/21/2021    Interval History: very good uo.  k ok. Working on d/c to rehab.   Feels ok      Medications:   Scheduled Meds:   atorvastatin  80 mg Oral Daily    apixaban  2.5 mg Oral BID    aspirin  81 mg Oral Daily    doxazosin  2 mg Oral Daily    ferrous sulfate  325 mg Oral BID WC    hydrALAZINE  100 mg Oral BID    isosorbide mononitrate  60 mg Oral Daily    torsemide  40 mg Oral Daily    carvedilol  6.25 mg Oral BID WC    ammonium lactate   Topical BID    sodium chloride flush  5-40 mL IntraVENous 2 times per day    insulin lispro  0-12 Units Subcutaneous TID WC    insulin lispro  0-6 Units Subcutaneous Nightly    insulin glargine  40 Units Subcutaneous Nightly    piperacillin-tazobactam  3,375 mg IntraVENous Q8H     Continuous Infusions:   sodium chloride      dextrose         CBC:   Recent Labs     08/25/21  0635 08/26/21  0821   WBC 8.5 7.3   HGB 9.6* 9.6*    173     CMP:    Recent Labs     08/24/21  0504 08/25/21  0635 08/26/21  0821    145* 142   K 4.5 4.4 4.3    101 99   CO2 33* 33* 35*   BUN 48* 46* 47*   CREATININE 2.17* 2.22* 2.52*   GLUCOSE 194* 174* 130*   CALCIUM 8.6 8.6 8.3*   LABGLOM 28.7* 28.0* 24.2*     Troponin:   No results for input(s): TROPONINI in the last 72 hours. BNP: No results for input(s): BNP in the last 72 hours. INR: No results for input(s): INR in the last 72 hours. Lipids:   Recent Labs     08/24/21  0504   HDL 32*     Liver:   Recent Labs     08/26/21  0821   AST 52*   ALT 45*   ALKPHOS 161*   PROT 5.5*   LABALBU 2.7*   BILITOT 0.7     Iron:  No results for input(s): IRONS, FERRITIN in the last 72 hours. Invalid input(s): LABIRONS  Urinalysis: No results for input(s): UA in the last 72 hours. Objective:   Vitals: /64   Pulse 92   Temp 98.1 °F (36.7 °C) (Oral)   Resp 22   Ht 5' 10\" (1.778 m)   Wt 211 lb 3.2 oz (95.8 kg)   SpO2 98%   BMI 30.30 kg/m²    Wt Readings from Last 3 Encounters:   08/23/21 211 lb 3.2 oz (95.8 kg)   08/12/21 205 lb (93 kg)   06/23/20 200 lb (90.7 kg)      24HR INTAKE/OUTPUT:      Intake/Output Summary (Last 24 hours) at 8/26/2021 1414  Last data filed at 8/26/2021 1130  Gross per 24 hour   Intake 375 ml   Output 1100 ml   Net -725 ml       Constitutional:  Alert, awake, no apparent distress   Skin:normal, no rash  HEENT:sclera anicteric.   Head atraumatic normocephalic  Neck:supple with no thyromegally  Cardiovascular:  S1, S2 without m/r/g   Respiratory:  CTA B without w/r/r   Abdomen: +bs, soft, nt  Ext: 2+ LE edema  Musculoskeletal:Intact  Neuro:Alert and oriented with no deficit      Electronically signed by Graeme Johnson MD on 8/26/2021 at 2:14 PM

## 2021-08-26 NOTE — PROGRESS NOTES
Physical Therapy Med Surg Daily Treatment Note  Facility/Department: [facility]  [facility]       NAME: Silvio Brady  : 1931 (21 y.o.)  MRN: [id number]  CODE STATUS: Full Code    Date of Service: 2021    Patient Diagnosis(es): Hyperkalemia [E87.5]  Right leg pain [M79.604]  BART (acute kidney injury) [facility]) [N17.9]   Chief Complaint   Patient presents with    Leg Pain     right upper leg     Patient Active Problem List    Diagnosis Date Noted    CKD stage 3 due to type 2 diabetes mellitus [facility] 2018    Coronary artery disease involving native coronary artery of native heart without angina pectoris 2018    Atrial fibrillation ([facility] 2018    Essential hypertension 2018    DM2 (diabetes mellitus, type 2) ([facility] 2018    Acute on chronic respiratory failure with hypercapnia (HCC)     Hyperkalemia 2021    Right thigh pain 2021    Hypertension 2020    Venous insufficiency of both lower extremities 2020    Non-pressure chronic ulcer of other part of right lower leg with fat layer exposed ([address] 2020    Chronic venous hypertension (idiopathic) with ulcer and inflammation of right lower extremity (CODE) ([facility] 2020    Leg wound, left, sequela 2019    CHF (congestive heart failure), NYHA class I, acute on chronic, combined ([address] 2018    Hyperlipidemia         Past Medical History:   Diagnosis Date    Anemia     Atrial fibrillation ([address]     CAD (coronary artery disease)     Hyperlipidemia     Hypertension     Type II or unspecified type diabetes mellitus without mention of complication, not stated as uncontrolled      Past Surgical History:   Procedure Laterality Date    CATARACT REMOVAL WITH IMPLANT Right      - cathy walters    FOOT FRACTURE SURGERY  1980s    work related - fell off a ladder    PTCA      4 stents - jill walters    ROTATOR CUFF REPAIR Right  Restrictions  Restrictions/Precautions: Fall Risk    SUBJECTIVE   General  Chart Reviewed: Yes  Family / Caregiver Present: No  Subjective  Subjective: \"If you can move my butt I'll be happy. \"    Pre-Session Pain Report  Pre Treatment Pain Screening  Pain at present: 6  Scale Used: Numeric Score  Intervention List: Patient able to continue with treatment  Pain Screening  Patient Currently in Pain: Yes       Post-Session Pain Report  Pain Assessment  Pain Assessment: 0-10  Pain Level: 6  Pain Type: Acute pain  Pain Location: Shoulder  Pain Orientation: Right         OBJECTIVE        Bed mobility  Supine to Sit: 2 Person assistance; Moderate assistance  Sit to Supine: 2 Person assistance; Moderate assistance  Comment: HOB elevated, pt SOB. Transfers  Comment: pt adamantly declines to attempt this session. Neuromuscular Education  Neuromuscular Comments: sitting EOB balance training, weight shifting and reaching OBOS, focus on maintaining midline. Activity Tolerance  Activity Tolerance: Patient limited by endurance (self limiting.)          ASSESSMENT   Assessment: pt needing assistance for all mobility, pt self limiting and adamantly declines STS training this date. Discharge Recommendations:  Continue to assess pending progress    Goals  Short term goals  Short term goal 1: min assist bed mobility  Short term goal 2: min assist sit to stand +1  Short term goal 3: mod assist gait 20 feet with ww  Short term goal 4: Pt able to stand 2 min with BUE support and min assist    PLAN    Times per week: 3-6  Safety Devices  Type of devices:  All fall risk precautions in place, Bed alarm in place, Call light within reach, Left in bed     [facility] (6 CLICK) [facility] [address] [facility] Inpatient Mobility Raw Score : 11      Therapy Time   Individual   Time In 1038   Time Out 1055   Minutes 17      BM: 10  NM: [address] GREG Momin, 08/26/21 at 11:13 AM         Definitions for assistance levels  Independent = pt does not require any physical supervision or assistance from another person for activity completion. Device may be needed.   Stand by assistance = pt requires verbal cues or instructions from another person, close to but not touching, to perform the activity  Minimal assistance= pt performs 75% or more of the activity; assistance is required to complete the activity  Moderate assistance= pt performs 50% of the activity; assistance is required to complete the activity  Maximal assistance = pt performs 25% of the activity; assistance is required to complete the activity  Dependent = pt requires total physical assistance to accomplish the task

## 2021-08-26 NOTE — FLOWSHEET NOTE
1930: [facility] here to transport pt to [facility].    Electronically signed by Sergio Marcano RN on 8/26/2021 at 7:35 PM

## 2021-08-26 NOTE — FLOWSHEET NOTE
Report called to Shruthi Reed RN at [facility] that [facility]  was scheduled for 1700 but that they are running behind.  Electronically signed by Ally Zimmer RN on 8/26/2021 at 5:52 PM

## 2021-08-26 NOTE — DISCHARGE INSTR - DIET

## 2021-08-26 NOTE — DISCHARGE SUMMARY
Physician Discharge Summary     Patient ID:  Tarsha Sterling  [id number]  15 y.o.  11/30/1931    Admit date: 8/21/2021    Discharge date : 08/26/21     Admitting Physician: Keo Almodovar MD     Discharge Physician: Sarah Livingston DO     Admission Diagnoses: Hyperkalemia [E87.5]  Right leg pain [M79.604]  BART (acute kidney injury) ([facility] [address] [N17.9]    Discharge Diagnoses: Acute hypoxic and hypercapnic respiratory failure, BART with associated hyperkalemia, metabolic encephalopathy    Admission Condition: fair    Discharged Condition: good    Hospital Course: 80 y.o. male with a history of CAD, hyperlipidemia, hypertension, diabetes presents with right thigh pain. Patient presents complaining of pain in his thigh that radiated from the knee upward to the hip. The pain awakened the patient from sleep. His wife gave him her muscle relaxant but did not improve the pain. Patient was given 4 mg of morphine in the ER to control the pain and that caused the patient to become hypoxic and lethargic. He was given Narcan with no improvement. When he was seen on the floor he was lethargic and difficult to arouse. He was noted to not be able to take deep breaths.     Patient's labs showed severe hyperkalemia with potassium 6.8. No EKG changes. Nephrology consulted in the ER and they were okay with medical management without need for stat dialysis. ABG showed respiratory acidosis and hypercapnia, thought to be due to morphine given in the ED. He was placed on BIPAP and monitored on the floor. Pulm and nephrology consulted. Pt was seen later in the morning by the hospitalist taking over care and patient continued to be very lethargic despite being on BIPAP so decision was made to move the patient to the ICU for closer monitoring and possible intubation. CT head ordered and negative. Neurology consulted for encephalopathy. Pt was treated with IV lasix and lokelma in the ICU for his hypoxia and hyperkalemia.  Potassium improved and respiratory status improved with diuresis. Nephrology changed lasix to lasix drip and patient diuresed very well and was able to be weaned from the BIPAP to nasal cannula. Pt was transferred back to the floor and PT/OT worked with patient who was much more awake and alert and recommended SNF. Neurology believed encephalopathy likely metabolic due to BART and decreased clearance of narcotics. Hypoxia likely 2/2 volume overload. Pt changed to PO diuretics. Pt chose [facility] at discharge and was accepted on 8/26 and discharged in stable and improved condition. Consults: cardiology, pulmonary/intensive care, nephrology and neurology      Discharge Exam:  Constitutional: Awake and alert in no acute distress. Lying in bed comfortably  Head: Normocephalic, atraumatic  Eyes: EOMI, PERRLA  ENT: moist mucous membranes  Neck: neck supple, trachea midline  Lungs: Good inspiratory effort, no wheeze, no rhonchi, no rales  Heart: RRR, normal S1 and S2  GI: Soft, non-distended, non tender, no guarding, no rebound, +BS  MSK: trace edema noted  Skin: warm, dry, bilateral heel wounds and open wound on left shin with oozing    Labs:   Recent Labs     08/24/21  0504 08/25/21  0635 08/26/21  0821   WBC 7.2 8.5 7.3   HGB 9.7* 9.6* 9.6*   HCT 30.2* 30.5* 30.1*    208 173     Recent Labs     08/24/21  0504 08/25/21  0635 08/26/21  0821    145* 142   K 4.5 4.4 4.3    101 99   CO2 33* 33* 35*   BUN 48* 46* 47*   CREATININE 2.17* 2.22* 2.52*   CALCIUM 8.6 8.6 8.3*     Recent Labs     08/24/21  0504 08/25/21  0635 08/26/21  0821   AST 58* 53* 52*   ALT 43* 49* 45*   BILITOT 0.6 0.7 0.7   ALKPHOS 167* 176* 161*     No results for input(s): INR in the last 72 hours. No results for input(s): Aquilino Jaxson in the last 72 hours.     Urinalysis:   Lab Results   Component Value Date    NITRU Negative 08/22/2021    WBCUA 6-9 08/22/2021    BACTERIA Negative 08/22/2021    RBCUA >100 08/22/2021    BLOODU LARGE 08/22/2021 than right. No Pneumothoraces. Impression  PULMONARY VASCULAR IS CONGESTED WHICH COULD SUGGEST COMPONENT OF CHF SUPERIMPOSED UPON COPD. AREAS OF ATELECTASIS, PATCHY INFILTRATES IN THE BASES LEFT GREATER THAN RIGHT      Echo No results found for this or any previous visit.       Disposition: SNF    In process/preliminary results:  Outstanding Order Results     Date and Time Order Name Status Description    8/22/2021  2:13 PM Culture, Blood 1 Preliminary     8/21/2021 11:02 PM Culture, Blood 2 Preliminary     8/21/2021 11:01 PM Culture, Blood 1 Preliminary           Patient Instructions:   Current Discharge Medication List      CONTINUE these medications which have NOT CHANGED    Details   doxazosin (CARDURA) 2 MG tablet Take 1 tablet by mouth daily    Associated Diagnoses: Essential hypertension      hydrALAZINE (APRESOLINE) 100 MG tablet Take 1 tablet by mouth 2 times daily    Associated Diagnoses: Essential hypertension      isosorbide mononitrate (IMDUR) 60 MG extended release tablet Take 1 tablet by mouth daily    Associated Diagnoses: Essential hypertension; Coronary artery disease involving native coronary artery of native heart without angina pectoris      ciprofloxacin (CIPRO) 500 MG tablet Take 1 tablet by mouth 2 times daily for 10 days  Qty: 20 tablet, Refills: 0    Associated Diagnoses: Wound infection      SODIUM CHLORIDE, EXTERNAL, 0.9 % SOLN Apply 1 Squirt topically 2 times daily  Qty: 1 Can, Refills: 3      ferrous sulfate (KP FERROUS SULFATE) 325 (65 Fe) MG tablet take 1 tablet by mouth twice a day  Qty: 180 tablet, Refills: 1    Associated Diagnoses: Iron deficiency anemia, unspecified iron deficiency anemia type      omeprazole (PRILOSEC) 20 MG delayed release capsule TAKE 1 CAPSULE EVERY DAY  Qty: 90 capsule, Refills: 2      finasteride (PROSCAR) 5 MG tablet TAKE 1 TABLET EVERY DAY  Qty: 90 tablet, Refills: 3      apixaban (ELIQUIS) 2.5 MG TABS tablet Take 1 tablet by mouth 2 times daily  Qty: 60 tablet, Refills: 3      torsemide (DEMADEX) 20 MG tablet Take 2 tablets by mouth daily  Qty: 60 tablet, Refills: 3      carvedilol (COREG) 12.5 MG tablet Take 1 tablet by mouth 2 times daily (with meals)  Qty: 60 tablet, Refills: 3      insulin glargine (LANTUS) 100 UNIT/ML injection vial Inject 40 Units into the skin nightly  Qty: 1 vial, Refills: 3      Insulin Aspart (NOVOLOG FLEXPEN SC) Inject 15 Units into the skin Daily with supper       vitamin B-12 (CYANOCOBALAMIN) 1000 MCG tablet Take 1,000 mcg by mouth daily      atorvastatin (LIPITOR) 80 MG tablet Take 1 tablet by mouth daily  Qty: 90 tablet, Refills: 1      aspirin 81 MG tablet Take 81 mg by mouth daily. losartan (COZAAR) 50 MG tablet Take 1 tablet by mouth daily  Qty: 30 tablet, Refills: 3         STOP taking these medications       cephALEXin (KEFLEX) 500 MG capsule Comments:   Reason for Stopping:         sulfamethoxazole-trimethoprim (BACTRIM DS;SEPTRA DS) 800-160 MG per tablet Comments:   Reason for Stopping:             Activity: activity as tolerated  Diet: cardiac diet  Wound Care: as directed    Follow-up with Xiomy Moreno PA-C  in 2 weeks.     DC time 35 minutes    Signed:  Electronically signed by Abiel Ramos DO on 8/26/2021 at 3:16 PM

## 2021-08-26 NOTE — PROGRESS NOTES
Patient's nurse, Denita Jo and Pancho GARRETTW aware that we have approval for transfer to [address] [place] [address].

## 2021-08-27 NOTE — PROGRESS NOTES
Physical Therapy    Facility/Department: [facility] MED SURG UNIT  Initial Assessment -[place] evaluation     NAME: Jeff Escobar  : 1931  MRN: [id number]    Date of Service: 2021    Discharge Recommendations:  Home with Home health PT, Home with assist PRN (Pts wife recently has to total shoulder replacement per pt)        Assessment   Body structures, Functions, Activity limitations: Decreased functional mobility ; Increased pain;Decreased balance;Decreased ROM; Decreased strength;Decreased endurance  Assessment: Pt is an 81 yo male who was admitted due to hyperkalemia, encephalopathy, BART and weakness. Pt now here in [place] for rehab due to increase weakness and pt now on 3L of O2. PT completed evaluation. Pt was very [place] so needed to speak directly into pts left ear and also OT wrote down instuctions on paper for pt. Pt needed assistance for bed mobilty and then was able to ambulate to the door and then to the recliner. PT discussed our recommendation for 2 weeks [place] and then home with Wili Wiley and assistance PRN for his wife. Pts wife just recently has a total shoulder replacement and pts reports that his kids live too far. Treatment Diagnosis: Hyperkalemia, encephalopathy, BART and weakness  Prognosis: Good  Decision Making: Medium Complexity  REQUIRES PT FOLLOW UP: Yes       Patient Diagnosis(es): Hyperkalemia, encephalopathy, BART, and weakness      has a past medical history of Anemia, Atrial fibrillation ([address] CAD (coronary artery disease), Hyperlipidemia, Hypertension, and Type II or unspecified type diabetes mellitus without mention of complication, not stated as uncontrolled. has a past surgical history that includes Percutaneous Transluminal Coronary Angio (); Rotator cuff repair (Right, 2009); Cataract removal with implant (Right); and Foot fracture surgery ().     Restrictions  Restrictions/Precautions  Restrictions/Precautions: Fall Risk  Vision/Hearing  Vision: Impaired  Vision Exceptions: Wears glasses for reading  Hearing: Exceptions to [facility]  Hearing Exceptions: Hard of hearing/hearing concerns; Left hearing aid     Subjective  General  Chart Reviewed: Yes  Patient assessed for rehabilitation services?: Yes  Additional Pertinent Hx: Pt came to hospital due to hyperkalemia, encephalopathy and BART, weakness - here for rehab  Family / Caregiver Present: No  Referring Practitioner: Kelvin Hermosillo  Referral Date : 08/27/21  Diagnosis: Hyperkalemia, encephalopathy, BART and weakness  Other (Comment): **PT VERY [place] - TALK INTO LEFT EAR  Subjective  Subjective: Pt reports 7/10 left hand and back pain  Pain Screening  Patient Currently in Pain: Yes  Pain Assessment  Pain Assessment: 0-10 (Simultaneous filing. User may not have seen previous data.)  Pain Type: Acute pain  Pain Location: Back;Head  Pain Descriptors: Aching  Vital Signs  Patient Currently in Pain: Yes       Orientation     Social/Functional History  Social/Functional History  Lives With: Spouse  Type of Home: House  Home Layout: One level  Home Access: Stairs to enter without rails  Entrance Stairs - Number of Steps: 1  Bathroom Shower/Tub: Walk-in shower  Bathroom Equipment: Shower chair, Grab bars in shower, Hand-held shower  Home Equipment: Rolling walker, Cane  Receives Help From: Friend(s) (wife recently had a shoulder replacement)  ADL Assistance: Independent  Homemaking Assistance:  (Wife takes care of homemaking)  Homemaking Responsibilities: No  Ambulation Assistance: Independent (with ww)  Transfer Assistance: Independent  Active : No (Reports still has his license but his wife does driving)  Occupation: Retired  Type of occupation:   Leisure & Hobbies: Watching TV  Additional Comments: no falls - left handed  Cognition        Objective     Observation/Palpation  Observation: 3L O2 via NC, IV RUE and dorsal surface L hand. L hand swollen. Mackay. Reddened shins BLE with edema, small dressing L shin.  After short fxl mob trial, pt's O2 sats 99% and  BPM.    AROM RLE (degrees)  RLE AROM: WNL  AROM LLE (degrees)  LLE AROM : WNL  AROM RUE (degrees)  RUE General AROM: Limited shoulder AROM  AROM LUE (degrees)  LUE General AROM: Limited shoulder AROM  Strength RLE  Comment: 3+/5  Strength LLE  Comment: 3+/5  Strength RUE  Strength RUE: WFL  Comment: But limted AROM  Strength LUE  Strength LUE: WFL  Comment: But limited AROM        Bed mobility  Supine to Sit: Minimal assistance (HOB elevated, use of BR)  Transfers  Sit to Stand: Contact guard assistance;Minimal Assistance  Stand to sit: Contact guard assistance  Ambulation  Ambulation?: Yes  Ambulation 1  Device: Rolling Walker  Assistance: Contact guard assistance;Minimal assistance (second person SBA)  Quality of Gait: Pt ambulated with decreased step length B LE with a slightly flexed forward posture  Distance: 10'x 2  Comments: Pt was shortly SOB after gait  Stairs/Curb  Stairs?: No              Plan   Plan  Times per week: 5-7  Times per day: Daily (1-2)  Plan weeks: 2 weeks [place] and then home with Renoquentin Wiley PT and assistance PRN  Current Treatment Recommendations: Strengthening, Transfer Training, Endurance Training, Neuromuscular Re-education, Patient/Caregiver Education & Training, Equipment Evaluation, Education, & procurement, Balance Training, Gait Training, Functional Mobility Training, Safety Education & Training, Home Exercise Program, Pain Management, Stair training  Plan Comment: Pt VERY [place]; Speak into his left ear; Pt has one step entry with no HRs;  Wife recently has a total shoulder replacement            Goals  Short term goals  Time Frame for Short term goals: 1 week  Short term goal 1: CGA assist bed mobility  Short term goal 2: CGA with transfers  Short term goal 3: Pt able to ambulate with AD for 75'x1 with CGA  Short term goal 4: Pt able to ambulate up/down 1 step with B HRs CGA  Short term goal 5: Pt able to tolerate 10 reps for BLEs  Long term goals  Time Frame for Long term goals : 2 weeks [place]  Long term goal 1: Pt able to perform bed mobility with supervision  Long term goal 2: Perform transfers with supervision  Long term goal 3: Pt able to ambulate with AD for >125'x 1 with supervision  Long term goal 4: Pt able to ambulate up/down 1 step without HRs supervision x 1 so pt can safely enter/exit his home  Long term goal 5: Pt indep with HEP  Patient Goals   Patient goals :  To get stronger to get home       Therapy Time   Individual Concurrent Group Co-treatment   Time In  10:10am         Time Out  11:00am          Minutes  50 min                  Jennifer Rodriguez, AF#[id number]

## 2021-08-27 NOTE — PROGRESS NOTES
Patient arrives on stretcher [id number] Transferred into bed 244-1 per medics and this nurse. Introduced to surroundings and controls including bed and call light. A&O x 4. Pleasant. Extremely [place]. Left hearing aide in place. Follows simple commands. Extremities weak. Respirations unlabored at rest on 3L NC. Mackay to CD draining clear, yellow urine. C/O buttock pain. Repositioned on side and cream applied. No acute distress noted. Please see assessment flowsheet for complete assessment.

## 2021-08-27 NOTE — PROGRESS NOTES
Occupational Therapy   Occupational Therapy Initial Assessment- [place]  Date: 2021   Patient Name: Pauly Lee  MRN: [id number]     : 1931    Date of Service: 2021    Discharge Recommendations:  Home with Home health OT, Home with assist PRN       Assessment   Performance deficits / Impairments: Decreased functional mobility ; Decreased ADL status; Decreased strength;Decreased safe awareness;Decreased endurance;Decreased balance;Decreased coordination  Assessment: Pt is an 79y/o male PLOF lives at home with wife, was I/MI with ADL, whom [facility] 2* Hyperkalemia, encephalopathy, BART. Pt demo's the above resulting perf def/impair and would benefit from OT skilled services to maximize strength/end and safety/I with ADL for safe d/c transition. Treatment Diagnosis: Hyperkalemia, encephalopathy, BART  Prognosis: Good  History: multi comorb  Exam: 7 perf def/impair  OT Education: OT Role;Plan of Care;Transfer Training  REQUIRES OT FOLLOW UP: Yes  Safety Devices  Safety Devices in place: Yes  Type of devices: All fall risk precautions in place           Patient Diagnosis(es): There were no encounter diagnoses. has a past medical history of Anemia, Atrial fibrillation ([address] CAD (coronary artery disease), Hyperlipidemia, Hypertension, and Type II or unspecified type diabetes mellitus without mention of complication, not stated as uncontrolled. has a past surgical history that includes Percutaneous Transluminal Coronary Angio (); Rotator cuff repair (Right, ); Cataract removal with implant (Right); and Foot fracture surgery ().     Treatment Diagnosis: Hyperkalemia, encephalopathy, BART      Restrictions  Restrictions/Precautions  Restrictions/Precautions: Fall Risk    Subjective   General  Chart Reviewed: Yes  Patient assessed for rehabilitation services?: Yes  Family / Caregiver Present: No  Referring Practitioner: Dr. Sara Moran  Diagnosis: Hyperkalemia, encephalopathy, BART  Subjective  Subjective: Pt agreeable to OT eval/tx  Patient Currently in Pain: Yes  Pain Assessment  Pain Assessment: 0-10 (Simultaneous filing. User may not have seen previous data.)  Pain Level: 7  Pain Type: Acute pain  Pain Location: Back;Head  Pain Orientation: Left  Pain Descriptors: Aching  Clinical Progression: Not changed  Vital Signs  Level of Consciousness: Alert (0)  Patient Currently in Pain: Yes     Social/Functional History  Social/Functional History  Lives With: Spouse  Type of Home: House  Home Layout: One level  Home Access: Stairs to enter without rails  Entrance Stairs - Number of Steps: 1  Bathroom Shower/Tub: Walk-in shower  Bathroom Equipment: Shower chair, Grab bars in shower, Hand-held shower  Home Equipment: Rolling walker, Cane  Receives Help From: Friend(s) (wife recently had a shoulder replacement)  ADL Assistance: Independent  Homemaking Assistance:  (Wife takes care of homemaking)  Homemaking Responsibilities: No  Ambulation Assistance: Independent (with ww)  Transfer Assistance: Independent  Active : No (Reports still has his license but his wife does driving)  Occupation: Retired  Type of occupation:   Leisure & Hobbies: Watching TV  Additional Comments: no falls - left handed       Objective   Vision: Impaired  Vision Exceptions: Wears glasses for reading  Hearing: Exceptions to [place] (Very [place]- talk in L ear)  Hearing Exceptions: Hard of hearing/hearing concerns; Left hearing aid    Orientation  Overall Orientation Status: Within Functional Limits  Observation/Palpation  Observation: 3L O2 via NC, IV RUE and dorsal surface L hand. L hand swollen. Mackay. Reddened shins BLE with edema, small dressing L shin.  After short fxl mob trial, pt's O2 sats 99% and  BPM.  Functional Mobility  Functional - Mobility Device: Rolling Walker  Activity: Other (short distance level tile surface)  Assist Level: Contact guard assistance  Functional Mobility Comments: Goals   Patient goals :  To get better       Therapy Time   Individual Concurrent Group Co-treatment   Time In  10:10         Time Out  11:10         Minutes  [address], [place]

## 2021-08-27 NOTE — PLAN OF CARE
Problem: Pain:  Description: Pain management should include both nonpharmacologic and pharmacologic interventions. Goal: Pain level will decrease  Description: Pain level will decrease  Outcome: Ongoing  Goal: Control of acute pain  Description: Control of acute pain  Outcome: Ongoing  Goal: Control of chronic pain  Description: Control of chronic pain  Outcome: Ongoing     Problem: Skin Integrity:  Goal: Will show no infection signs and symptoms  Description: Will show no infection signs and symptoms  Outcome: Ongoing  Goal: Absence of new skin breakdown  Description: Absence of new skin breakdown  Outcome: Ongoing     Problem: Falls - Risk of:  Goal: Will remain free from falls  Description: Will remain free from falls  Outcome: Ongoing  Goal: Absence of physical injury  Description: Absence of physical injury  Outcome: Ongoing     Problem: SAFETY  Goal: Free from accidental physical injury  Outcome: Ongoing  Goal: Free from intentional harm  Outcome: Ongoing     Problem: DAILY CARE  Goal: Daily care needs are met  Outcome: Ongoing     Problem: KNOWLEDGE DEFICIT  Goal: Patient/S.O. demonstrates understanding of disease process, treatment plan, medications, and discharge instructions.   Outcome: Ongoing     Problem: SKIN INTEGRITY  Goal: Skin integrity is maintained or improved  Outcome: Ongoing     Problem: DISCHARGE BARRIERS  Goal: Patient's continuum of care needs are met  Outcome: Ongoing     Problem: Gas Exchange - Impaired:  Goal: Levels of oxygenation will improve  Description: Levels of oxygenation will improve  Outcome: Ongoing

## 2021-08-27 NOTE — PROGRESS NOTES
Comprehensive Nutrition Assessment    Type and Reason for Visit:  Initial (Subacute admit for weakness r/t encephalopathy)    Nutrition Recommendations/Plan: Continue Current Diet, Start Oral Nutrition Supplement    Nutrition Assessment:  Meets criteria for mild malnutrition in chronic setting. Suspect decreased intake >5 days, placing him at increased risk for additional compromise. Will start Magic cup BID to help meet estimate nutrient needs. Follow for LOS. Malnutrition Assessment:  Malnutrition Status:  Mild malnutrition    Context:  Chronic Illness     Findings of the 6 clinical characteristics of malnutrition:  Energy Intake:  Unable to assess  Weight Loss:  No significant weight loss     Body Fat Loss:  1 - Mild body fat loss Triceps   Muscle Mass Loss:  1 - Mild muscle mass loss Temples (temporalis)  Fluid Accumulation:  1 - Mild     Strength:  Not Performed    Estimated Daily Nutrient Needs:  Energy (kcal):  1715-7659 @ 19-21 kcal/kg; Weight Used for Energy Requirements:  Current     Protein (g):  75-91 @ 1-1.2 gr/kg; Weight Used for Protein Requirements:  Ideal        Fluid (ml/day):  1886 ml/d; Method Used for Fluid Requirements:  ml/Kg      Nutrition Related Findings:  +1 pitting BUE, trace pitting BLE edema. Last BM 8/24. Glucose 116-310, potassium 4.1, BUN/creat 58/2. 50. PMH: CAD, Afib, HLD, HTN, DMII. Wounds:  Skin Tears       Current Nutrition Therapies:    ADULT DIET; Regular; 4 carb choices (60 gm/meal); Low Potassium (Less than 3000 mg/day); Low Phosphorus (Less than 1000 mg)    Anthropometric Measures:  · Height: 5' 10\" (177.8 cm)  · Current Body Weight: 205 lb 0.4 oz (93 kg)   · Admission Body Weight: 205 lb 0.4 oz (93 kg)    · Usual Body Weight: 205 lb 0.4 oz (93 kg) (8/12/21)     · Ideal Body Weight: 166 lbs; % Ideal Body Weight 123.5 %   · BMI: 29.4  · Adjusted Body Weight:  ; No Adjustment   · BMI Categories: Overweight (BMI 25.0-29. 9)       Nutrition Diagnosis: · Predicted inadequate energy intake related to acute injury/trauma, cognitive or neurological impairment as evidenced by intake 0-25%      Nutrition Interventions:   Food and/or Nutrient Delivery:  Continue Current Diet, Start Oral Nutrition Supplement  Nutrition Education/Counseling:  Education not indicated   Coordination of Nutrition Care:  Continue to monitor while inpatient, Interdisciplinary Rounds    Goals:  Intake > 50% of meals & 100% ONS. Glu <180. Nutrition Monitoring and Evaluation:   Behavioral-Environmental Outcomes:  None Identified   Food/Nutrient Intake Outcomes:  Food and Nutrient Intake, Supplement Intake  Physical Signs/Symptoms Outcomes:  Biochemical Data, Meal Time Behavior, Skin, Weight     Discharge Planning:     Too soon to determine     Electronically signed by Mallika Vincent RD, LD on 8/27/21 at 12:51 PM EDT

## 2021-08-27 NOTE — PLAN OF CARE
Nutrition Problem #1: Predicted inadequate energy intake  Intervention: Food and/or Nutrient Delivery: Continue Current Diet, Start Oral Nutrition Supplement  Nutritional Goals: Intake > 50% of meals & 100% ONS. Glu <180.

## 2021-08-27 NOTE — H&P
Hospital Medicine History & Physical      PCP: Navin Bhatti PA-C    Date of Admission: 8/26/2021    Date of Service: 8/27/21      Chief Complaint:  weakness      History Of Present Illness:  80 y.o. male who presented to [facility] after acute [facility] with severe hyperkalemia with potassium 6.8. hypoxia, encephalopathy, volume overload, BART. patient diuresed very well and was able to be weaned from the BIPAP to nasal cannula. Neurology believed encephalopathy likely metabolic due to BART and decreased clearance of narcotics. Hypoxia likely 2/2 volume overload. Pt changed to PO diuretics and he was transferred to [facility] for post acute rehabilitation. Denied fever, dyspnea, CP          Past Medical History:          Diagnosis Date    Anemia     Atrial fibrillation ([address]     CAD (coronary artery disease)     Hyperlipidemia     Hypertension     Type II or unspecified type diabetes mellitus without mention of complication, not stated as uncontrolled        Past Surgical History:          Procedure Laterality Date    CATARACT REMOVAL WITH IMPLANT Right     2013 - cathy walters    FOOT FRACTURE SURGERY  1980s    work related - fell off a ladder    PTCA  2008    4 stents - jill walters    ROTATOR CUFF REPAIR Right 2009       Medications Prior to Admission:      Prior to Admission medications    Medication Sig Start Date End Date Taking?  Authorizing Provider   doxazosin (CARDURA) 2 MG tablet Take 1 tablet by mouth daily 7/17/21  Yes Historical Provider, MD   hydrALAZINE (APRESOLINE) 100 MG tablet Take 1 tablet by mouth 2 times daily 7/20/21  Yes Historical Provider, MD   isosorbide mononitrate (IMDUR) 60 MG extended release tablet Take 1 tablet by mouth daily 8/3/21  Yes Historical Provider, MD   ferrous sulfate ( FERROUS SULFATE) 325 (65 Fe) MG tablet take 1 tablet by mouth twice a day 4/15/21  Yes Xiomy Moreno PA-C   apixaban (ELIQUIS) 2.5 MG TABS tablet Take 1 tablet by mouth 2 times daily 9/30/18  Yes Marylee Fortis, MD   torsemide (DEMADEX) 20 MG tablet Take 2 tablets by mouth daily  Patient taking differently: Take 20 mg by mouth daily m-w-f 1 TABLET TWICE DAILY AND Tue, Thur sat and Sunday once a day 10/1/18  Yes Marylee Fortis, MD   carvedilol (COREG) 12.5 MG tablet Take 1 tablet by mouth 2 times daily (with meals) 9/30/18  Yes Marylee Fortis, MD   insulin glargine (LANTUS) 100 UNIT/ML injection vial Inject 40 Units into the skin nightly 9/29/18  Yes Svetlana Gregg DO   Insulin Aspart (NOVOLOG FLEXPEN SC) Inject 15 Units into the skin Daily with supper    Yes Historical Provider, MD   atorvastatin (LIPITOR) 80 MG tablet Take 1 tablet by mouth daily  Patient taking differently: Take 80 mg by mouth daily Takes  At hs 5/12/18  Yes Amanda Rodriguez MD   aspirin 81 MG tablet Take 81 mg by mouth daily. Yes Historical Provider, MD   ciprofloxacin (CIPRO) 500 MG tablet Take 1 tablet by mouth 2 times daily for 10 days 8/17/21 8/27/21  LISSET Villarreal   SODIUM CHLORIDE, EXTERNAL, 0.9 % SOLN Apply 1 Squirt topically 2 times daily 4/16/21   Xiomy Moreno PA-C   omeprazole (PRILOSEC) 20 MG delayed release capsule TAKE 1 CAPSULE EVERY DAY 3/24/21   Xiomy Moreno PA-C   finasteride (PROSCAR) 5 MG tablet TAKE 1 TABLET EVERY DAY 3/3/21   Laura Verma MD   losartan (COZAAR) 50 MG tablet Take 1 tablet by mouth daily 10/1/18   Marylee Fortis, MD   vitamin B-12 (CYANOCOBALAMIN) 1000 MCG tablet Take 1,000 mcg by mouth daily    Historical Provider, MD       Allergies:  Patient has no known allergies. Social History:      The patient currently lives home    TOBACCO:   reports that he has never smoked. He has never used smokeless tobacco.  ETOH:   reports no history of alcohol use. Family History:       Reviewed in detail and negative for DM, CAD, Cancer, CVA. Positive as follows:    No family history on file.     REVIEW OF SYSTEMS:   Pertinent positives as noted in the HPI. All other systems reviewed and negative. PHYSICAL EXAM:    /71   Pulse 76   Temp 98.1 °F (36.7 °C) (Oral)   Resp 20   Ht 5' 10\" (1.778 m)   Wt 205 lb (93 kg)   SpO2 99%   BMI 29.41 kg/m²     General appearance:  No apparent distress, appears stated age and cooperative. HEENT:  Normal cephalic, atraumatic without obvious deformity. Pupils equal, round, and reactive to light. Extra ocular muscles intact. Conjunctivae/corneas clear. Neck: Supple, with full range of motion. No jugular venous distention. Trachea midline. Respiratory:  Normal respiratory effort. Clear to auscultation, bilaterally without Rales/Wheezes/Rhonchi. Cardiovascular:  Regular rate and rhythm with normal S1/S2 without murmurs, rubs or gallops. Abdomen: Soft, non-tender, non-distended with normal bowel sounds. Musculoskeletal: some edema bilaterally. Skin: Skin color, texture, turgor normal.  No rashes or lesions. Neurologic:  Neurovascularly intact without any focal sensory/motor deficits. Cranial nerves: II-XII intact, grossly non-focal.  Psychiatric:  Alert and oriented, thought content appropriate, normal insight        Labs:     Recent Labs     08/25/21  0635 08/26/21  0821 08/27/21  0538   WBC 8.5 7.3 6.9   HGB 9.6* 9.6* 8.9*   HCT 30.5* 30.1* 29.8*    173 107*     Recent Labs     08/25/21  0635 08/26/21  0821 08/27/21  0538   * 142 139   K 4.4 4.3 4.1    99 97   CO2 33* 35* 30   BUN 46* 47* 58*   CREATININE 2.22* 2.52* 2.50*   CALCIUM 8.6 8.3* 8.8     Recent Labs     08/25/21  0635 08/26/21  0821   AST 53* 52*   ALT 49* 45*   BILITOT 0.7 0.7   ALKPHOS 176* 161*     No results for input(s): INR in the last 72 hours. No results for input(s): Lorne Snowball in the last 72 hours.     Urinalysis:      Lab Results   Component Value Date    NITRU Negative 08/22/2021    WBCUA 6-9 08/22/2021    BACTERIA Negative 08/22/2021    RBCUA >100 08/22/2021    BLOODU LARGE 08/22/2021    SPECGRAV 1.015 08/22/2021    GLUCOSEU 250 08/22/2021           No orders to display       ASSESSMENT:    Active Hospital Problems    Diagnosis Date Noted    Hyperkalemia [E87.5] 08/22/2021       PLAN:        DVT Prophylaxis:   Diet: ADULT DIET; Regular; 4 carb choices (60 gm/meal); Low Potassium (Less than 3000 mg/day); Low Phosphorus (Less than 1000 mg)  Code Status: Full Code    PT/OT Eval Status:     Dispo - Acute metabolic encephalopathy- resolved, continue with supportive care  HTN- controlled  DM/hyperglycemia- continue with current insulin, follow up with ACCU check, ISS on case  Acute respiratory failure- O2, supportive care  CKD/BART- continue with medical Tx, nephrology to follow up, BMP biweekly  Patient remained full code during this admission at [facility]  Medically stable for skilled status at Chandni Johnson MD    Thank you Xiomy Moreno PA-C for the opportunity to be involved in this patient's care. If you have any questions or concerns please feel free to contact me.

## 2021-08-28 NOTE — PROGRESS NOTES
Patient refused BIPAP at this time, stating it hurt to much to wear it and wished to remain on nasal cannula for the night

## 2021-08-28 NOTE — PROGRESS NOTES
Hospitalist Progress Note      PCP: Alden Homans, PA-C    Date of Admission: 8/26/2021    Chief Complaint: weakness    Subjective: pt awake, alert, looks comfortable today AM     Medications:  Reviewed    Infusion Medications    dextrose       Scheduled Medications    polyethylene glycol  17 g Oral Daily    insulin glargine  40 Units Subcutaneous Nightly    insulin lispro  0-12 Units Subcutaneous TID WC    insulin lispro  0-6 Units Subcutaneous Nightly    ammonium lactate   Topical BID    apixaban  2.5 mg Oral BID    aspirin  81 mg Oral Daily    atorvastatin  80 mg Oral Nightly    [Held by provider] carvedilol  6.25 mg Oral BID WC    [Held by provider] doxazosin  2 mg Oral Daily    ferrous sulfate  325 mg Oral BID WC    [Held by provider] hydrALAZINE  100 mg Oral BID    [Held by provider] isosorbide mononitrate  60 mg Oral Daily    torsemide  40 mg Oral Daily     PRN Meds: glucose, dextrose, glucagon (rDNA), dextrose, traMADol, ondansetron **OR** ondansetron, polyethylene glycol, acetaminophen **OR** acetaminophen      Intake/Output Summary (Last 24 hours) at 8/28/2021 0745  Last data filed at 8/28/2021 6862  Gross per 24 hour   Intake 450 ml   Output 1550 ml   Net -1100 ml       Exam:    BP (!) 134/51   Pulse 98   Temp 97.7 °F (36.5 °C) (Oral)   Resp 18   Ht 5' 10\" (1.778 m)   Wt 205 lb (93 kg)   SpO2 99%   BMI 29.41 kg/m²     General appearance: No apparent distress, appears stated age and cooperative. HEENT: Pupils equal, round, and reactive to light. Conjunctivae/corneas clear. Neck: Supple, with full range of motion. No jugular venous distention. Trachea midline. Respiratory:  Normal respiratory effort. Clear to auscultation, bilaterally without Rales/Wheezes/Rhonchi. Cardiovascular: Regular rate and rhythm with normal S1/S2 without murmurs, rubs or gallops. Abdomen: Soft, non-tender, non-distended with normal bowel sounds. Musculoskeletal:  edema bilaterally.   Full range of motion without deformity. Skin: Skin color, texture, turgor normal.  No rashes or lesions. Neurologic:  Neurovascularly intact without any focal sensory/motor deficits. Cranial nerves: II-XII intact, grossly non-focal.  Psychiatric: Alert and oriented, thought content appropriate, normal insight  Capillary Refill: Brisk,< 3 seconds   Peripheral Pulses: +2 palpable, equal bilaterally       Labs:   Recent Labs     08/26/21  0821 08/27/21  0538   WBC 7.3 6.9   HGB 9.6* 8.9*   HCT 30.1* 29.8*    107*     Recent Labs     08/26/21  0821 08/27/21  0538    139   K 4.3 4.1   CL 99 97   CO2 35* 30   BUN 47* 58*   CREATININE 2.52* 2.50*   CALCIUM 8.3* 8.8     Recent Labs     08/26/21 0821   AST 52*   ALT 45*   BILITOT 0.7   ALKPHOS 161*     No results for input(s): INR in the last 72 hours. No results for input(s): Malou Danay in the last 72 hours. Urinalysis:      Lab Results   Component Value Date    NITRU Negative 08/22/2021    WBCUA 6-9 08/22/2021    BACTERIA Negative 08/22/2021    RBCUA >100 08/22/2021    BLOODU LARGE 08/22/2021    SPECGRAV 1.015 08/22/2021    GLUCOSEU 250 08/22/2021       Radiology:  No orders to display           Assessment/Plan:    Active Hospital Problems    Diagnosis Date Noted    Hyperkalemia [E87.5] 08/22/2021         DVT Prophylaxis: apixaban  Diet: ADULT DIET; Regular; 4 carb choices (60 gm/meal); Low Potassium (Less than 3000 mg/day); Low Phosphorus (Less than 1000 mg)  Adult Oral Nutrition Supplement; Frozen Oral Supplement  Code Status: Full Code    PT/OT Eval Status: done    Dispo -  Acute metabolic encephalopathy- resolved, continue with supportive care  HTN- BP on lower side, meds were adjusted, follow up clinically  DM/hyperglycemia- increase lantus to BID, follow up with ACCU check, ISS on case  Acute respiratory failure- O2, supportive care.  Refused over night BIPAP  CKD/BART- continue with medical Tx, nephrology to follow up, BMP biweekly  Medically stable for skilled status at [employer] signed by Denise Cutler MD on 8/28/2021 at 7:45 AM

## 2021-08-28 NOTE — PROGRESS NOTES
Physical Therapy  Facility/Department: [facility] MED SURG UNIT  Daily Treatment Note  NAME: Dave Taylor  : 1931  MRN: [id number]    Date of Service: 2021    Discharge Recommendations:  Home with Home health PT, Home with assist PRN (Pts wife recently has to total shoulder replacement per pt)        Assessment   Body structures, Functions, Activity limitations: Decreased functional mobility ; Increased pain;Decreased balance;Decreased ROM; Decreased strength;Decreased endurance  Assessment: Pt. lying in bed upon arrival. Agreeable to gait training and jenifer pina onmichael one step at home per pt. Assited Nursing Aide in bathing at sink CGA x 1 to assist with standing balance at grab bar and Aide assisting in hygeine. Pt. with limited endurance mild SOB. SPO2 desat to 86% post amb but with LB inc quickly to 96%. Stood x ~3 min at sink for hygeine. Pt. up in recliner post.    Treatment Diagnosis: Hyperkalemia, encephalopathy, BART and weakness  Prognosis: Good  REQUIRES PT FOLLOW UP: Yes  Activity Tolerance  Activity Tolerance: Patient limited by endurance     Patient Diagnosis(es): There were no encounter diagnoses. has a past medical history of Anemia, Atrial fibrillation ([facility] [address] CAD (coronary artery disease), Hyperlipidemia, Hypertension, and Type II or unspecified type diabetes mellitus without mention of complication, not stated as uncontrolled. has a past surgical history that includes Percutaneous Transluminal Coronary Angio (); Rotator cuff repair (Right, ); Cataract removal with implant (Right); and Foot fracture surgery (). Restrictions  Restrictions/Precautions  Restrictions/Precautions: Fall Risk  Subjective   General  Chart Reviewed:  Yes  Additional Pertinent Hx: Pt came to hospital due to hyperkalemia, encephalopathy and BART, weakness - here for rehab  Family / Caregiver Present: No  Referring Practitioner: Misti Nichole  Subjective  Subjective: Pt. lying in bed agreeable to PT session. Pt. states he only has one step to get into his home. General Comment  Comments: 3LO2 donned. SpO2 98% at rest           Orientation     Cognition      Objective      Transfers  Sit to Stand: Contact guard assistance;Minimal Assistance  Stand to sit: Contact guard assistance  Bed to Chair: Contact guard assistance  Comment: VC to reach back B UE not to plop in chair. Ambulation  Ambulation?: Yes  Ambulation 1  Surface: level tile  Device: Rolling Walker  Assistance: Contact guard assistance  Quality of Gait: Ambulated with dec step length B,  tendency to look down inc UE WB at [employer]. Therapist managing O2 line VC for direction to turn for inc safety. Pt.   Distance: 48', 20'   Comments: mild SOB SPO2 desat 885but quickly improved to 96% with cues for PLB. Stairs/Curb  Stairs?: Yes  Stairs  # Steps : 10  Stairs Height:  (4\" and 6\" )  Rails: Bilateral  Assistance: Contact guard assistance  Comment: Non reciprical pattern VC for direction when turning therapist managing O2 line and arevalo   Pt. states only has one step at home. Balance  Sitting - Static: Fair  Sitting - Dynamic: Fair  Standing - Static: Fair  Standing - Dynamic: Fair;-  Comments: Pt. stood at sink for mod A lowerbody clean up at sink. Stood x 3 min and able to assist with one UE support and CGA.     Exercises  Ankle Pumps: x20 seated EOB                           Goals  Short term goals  Time Frame for Short term goals: 1 week  Short term goal 1: CGA assist bed mobility  Short term goal 2: CGA with transfers  Short term goal 3: Pt able to ambulate with AD for 75'x1 with CGA  Short term goal 4: Pt able to ambulate up/down 1 step with B HRs CGA  Short term goal 5: Pt able to tolerate 10 reps for BLEs  Long term goals  Time Frame for Long term goals : 2 weeks [place]  Long term goal 1: Pt able to perform bed mobility with supervision  Long term goal 2: Perform transfers with supervision  Long term goal 3: Pt able to ambulate with AD for >125'x 1 with supervision  Long term goal 4: Pt able to ambulate up/down 1 step without HRs supervision x 1 so pt can safely enter/exit his home  Long term goal 5: Pt indep with HEP  Patient Goals   Patient goals : To get stronger to get home    Plan    Plan  Times per week: 5-7  Times per day: Daily  Plan weeks: 2 weeks [place] and then home with [facility] PT and assistance PRN  Current Treatment Recommendations: Strengthening, Transfer Training, Endurance Training, Neuromuscular Re-education, Patient/Caregiver Education & Training, Equipment Evaluation, Education, & procurement, Balance Training, Gait Training, Functional Mobility Training, Safety Education & Training, Home Exercise Program, Pain Management, Stair training  Plan Comment: Pt VERY [place]; Speak into his left ear; Pt has one step entry with no HRs; Wife recently has a total shoulder replacement  Safety Devices  Type of devices:  All fall risk precautions in place, Call light within reach, Chair alarm in place, Left in chair  Restraints  Initially in place: Yes     Therapy Time   Individual Concurrent Group Co-treatment   Time In  925         Time Out  [address], PTA  License and [address] Number: [id number]

## 2021-08-29 PROBLEM — K92.2 GI BLEED: Status: ACTIVE | Noted: 2021-01-01

## 2021-08-29 NOTE — PROGRESS NOTES
2343: The patient refused to wear the Bipap tonight. The patient is on a NC running at 3Lpm. Please see flowsheets for pertinent documentation.

## 2021-08-29 NOTE — PROGRESS NOTES
Occupational Therapy  Facility/Department: [facility] MED SURG UNIT  Daily Treatment Note  NAME: David Simpson  : 1931  MRN: [id number]    Date of Service: 2021    Discharge Recommendations:  Home with Home health OT, Home with assist PRN       Assessment      Activity Tolerance  Activity Tolerance: Patient Tolerated treatment well         Patient Diagnosis(es): There were no encounter diagnoses. has a past medical history of Anemia, Atrial fibrillation ([address] CAD (coronary artery disease), Hyperlipidemia, Hypertension, and Type II or unspecified type diabetes mellitus without mention of complication, not stated as uncontrolled. has a past surgical history that includes Percutaneous Transluminal Coronary Angio (); Rotator cuff repair (Right, ); Cataract removal with implant (Right); and Foot fracture surgery ().     Restrictions  Restrictions/Precautions  Restrictions/Precautions: Fall Risk  Required Braces or Orthoses?: No  Subjective   General  Chart Reviewed: Yes  Patient assessed for rehabilitation services?: Yes  Family / Caregiver Present: No  Referring Practitioner: Dr. Dyan Andino  Diagnosis: Hyperkalemia, encephalopathy, BART  Subjective  Subjective: Pt agreeable to OT tx  (asked if I could do something for his pain 10/10 prior to tx)  Pain Assessment  Pain Assessment: 0-10  Pain Level: 10  Pain Location: Hand;Buttocks;Back  Pain Frequency: Continuous  Response to Pain Intervention: Patient Satisfied (repositioned & heat pack, decreased pain from 10/10-7-10 )  Pre Treatment Pain Screening  Pain at present: 10  Intervention List: Patient able to continue with treatment  Vital Signs  Patient Currently in Pain: Yes  Patient Observation  Observations: pt alert and oriented, seated supine with poor body position    Orientation     Objective    ADL  Feeding: Independent  UE Dressing: Stand by assistance,         Standing Balance  Time: able to stand for 4min with SBA  Activity: ADL   Functional

## 2021-08-29 NOTE — DISCHARGE SUMMARY
COZAAR  Take 1 tablet by mouth daily     NOVOLOG FLEXPEN SC     omeprazole 20 MG delayed release capsule  Commonly known as: PRILOSEC  TAKE 1 CAPSULE EVERY DAY     SODIUM CHLORIDE (EXTERNAL) 0.9 % Soln  Apply 1 Squirt topically 2 times daily     torsemide 20 MG tablet  Commonly known as: DEMADEX  Take 2 tablets by mouth daily     vitamin B-12 1000 MCG tablet  Commonly known as: CYANOCOBALAMIN            Physical Exam:    Vitals:  Vitals:    08/29/21 0547 08/29/21 0921 08/29/21 1523 08/29/21 1538   BP: (!) 132/49  (!) 96/46 (!) 113/50   Pulse: 114  57 54   Resp: 18      Temp: 99 °F (37.2 °C)      TempSrc: Oral      SpO2: 96% 97% 100% 100%   Weight:       Height:         Weight: Weight: 205 lb (93 kg)     24 hour intake/output:    Intake/Output Summary (Last 24 hours) at 8/29/2021 1609  Last data filed at 8/29/2021 0514  Gross per 24 hour   Intake 300 ml   Output 1200 ml   Net -900 ml       General appearance - alert, well appearing, and in no distress  Chest - clear to auscultation, no wheezes, rales or rhonchi, symmetric air entry  Heart - normal rate, regular rhythm, normal S1, S2, no murmurs, rubs, clicks or gallops  Abdomen - soft, nontender, nondistended, no masses or organomegaly  Obese: No; Protuberant: No   Neurological - alert, oriented, normal speech, no focal findings or movement disorder noted  Extremities - peripheral pulses normal, no pedal edema, no clubbing or cyanosis  Skin - normal coloration and turgor, no rashes, no suspicious skin lesions noted      Radiology reports as per the Radiologist  Radiology: No results found.      Results for orders placed or performed during the hospital encounter of 08/26/21   CBC   Result Value Ref Range    WBC 6.9 4.2 - 9.0 K/uL    RBC 3.18 (L) 4.63 - 6.08 M/uL    Hemoglobin 8.9 (L) 13.7 - 17.5 g/dL    Hematocrit 29.8 (L) 42.0 - 52.0 %    MCV 93.7 (H) 79.0 - 92.2 fL    MCH 28.0 25.7 - 32.2 pg    MCHC 29.9 (L) 32.3 - 36.5 %    RDW 14.3 11.6 - 14.4 %    Platelets 215 (L) 163 - 337 K/uL    PLATELET SLIDE REVIEW Clumped    Basic Metabolic Panel w/ Reflex to MG   Result Value Ref Range    Sodium 139 135 - 144 mEq/L    Potassium reflex Magnesium 4.1 3.4 - 4.9 mEq/L    Chloride 97 95 - 107 mEq/L    CO2 30 20 - 31 mEq/L    Anion Gap 12 9 - 15 mEq/L    Glucose 146 (H) 70 - 99 mg/dL    BUN 58 (H) 8 - 23 mg/dL    CREATININE 2.50 (H) 0.70 - 1.20 mg/dL    GFR Non-African American 24.4 (L) >60    GFR  29.5 (L) >60    Calcium 8.8 8.5 - 9.9 mg/dL   Hemoglobin A1C   Result Value Ref Range    Hemoglobin A1C 7.3 (H) 4.8 - 5.9 %   CBC Auto Differential   Result Value Ref Range    WBC 12.6 (H) 4.2 - 9.0 K/uL    RBC 2.16 (L) 4.63 - 6.08 M/uL    Hemoglobin 6.0 (LL) 13.7 - 17.5 g/dL    Hematocrit 20.7 (LL) 42.0 - 52.0 %    MCV 95.8 (H) 79.0 - 92.2 fL    MCH 27.8 25.7 - 32.2 pg    MCHC 29.0 (L) 32.3 - 36.5 %    RDW 14.0 11.6 - 14.4 %    Platelets 017 601 - 440 K/uL    Neutrophils % 70.0 (H) 34.0 - 67.9 %    Immature Granulocytes % 0.7 %    Lymphocytes % 14.0 %    Monocytes % 12.4 (H) 5.3 - 12.2 %    Eosinophils % 2.3 0.8 - 7.0 %    Basophils % 0.6 0.2 - 1.2 %    Neutrophils Absolute 8.8 (H) 1.8 - 5.4 K/uL    Immature Granulocytes # 0.1 K/uL    Lymphocytes Absolute 1.8 1.3 - 3.6 K/uL    Monocytes Absolute 1.6 (H) 0.3 - 0.8 K/uL    Eosinophils Absolute 0.3 0.0 - 0.5 K/uL    Basophils Absolute 0.1 0.0 - 0.1 K/uL   POCT Glucose   Result Value Ref Range    POC Glucose 206 (H) 60 - 115 mg/dl    Performed on ACCU-CHEK    POCT Glucose   Result Value Ref Range    POC Glucose 124 (H) 60 - 115 mg/dl    Performed on ACCU-CHEK    POCT Glucose   Result Value Ref Range    POC Glucose 307 (H) 60 - 115 mg/dl    Performed on ACCU-CHEK    POCT Glucose   Result Value Ref Range    POC Glucose 244 (H) 60 - 115 mg/dl    Performed on ACCU-CHEK    POCT Glucose   Result Value Ref Range    POC Glucose 406 (HH) 60 - 115 mg/dl    Performed on ACCU-CHEK    POCT Glucose   Result Value Ref Range    POC Glucose 451 (HH) 60 - 115 mg/dl    Performed on ACCU-CHEK    POCT Glucose   Result Value Ref Range    POC Glucose 348 (H) 60 - 115 mg/dl    Performed on ACCU-CHEK    POCT Glucose   Result Value Ref Range    POC Glucose 276 (H) 60 - 115 mg/dl    Performed on ACCU-CHEK    POCT Glucose   Result Value Ref Range    POC Glucose 323 (H) 60 - 115 mg/dl    Performed on ACCU-CHEK    POCT Glucose   Result Value Ref Range    POC Glucose 300 (H) 60 - 115 mg/dl    Performed on ACCU-CHEK    POCT Glucose   Result Value Ref Range    POC Glucose 340 (H) 60 - 115 mg/dl    Performed on ACCU-CHEK    POCT Glucose   Result Value Ref Range    POC Glucose 298 (H) 60 - 115 mg/dl    Performed on ACCU-CHEK    POCT Glucose   Result Value Ref Range    POC Glucose 326 (H) 60 - 115 mg/dl    Performed on ACCU-CHEK    EKG 12 Lead   Result Value Ref Range    Ventricular Rate 107 BPM    Atrial Rate 115 BPM    QRS Duration 98 ms    Q-T Interval 338 ms    QTc Calculation (Bazett) 451 ms    R Axis 56 degrees    T Axis 201 degrees       Diet:  Adult Oral Nutrition Supplement; Frozen Oral Supplement  ADULT DIET;  Clear Liquid    Activity:      Follow-up:   with Xiomy Moreno PA-C,       Disposition: [facility]     Condition: Unstable    Time Spent: 55 minutes    Electronically signed by Ritika Membreno MD on 8/29/2021 at 4:09 PM    Discharging Hospitalist

## 2021-08-29 NOTE — PROGRESS NOTES
[phone] Call out to transfer center to initiate transfer to [facility]. Patient is GI bleed . Admitting physician is Krystle Fritz. Awaiting bed assignment. Bed assignment received . Patient will be going to [address] bed one. Patient is diaphoratic dizzy and week ,sick in his stomach but no complains of pain at the moment. Patient had second bloody stool. It will be stat transport to [facility]. Spouse updated.

## 2021-08-29 NOTE — PROGRESS NOTES
Physical Therapy  Facility/Department: [facility] MED SURG UNIT  Daily Treatment Note  NAME: Mary Delgado  : 1931  MRN: [id number]    Date of Service: 2021    Discharge Recommendations:  Home with Home health PT, Home with assist PRN (Pts wife recently has to total shoulder replacement per pt)        Assessment   Body structures, Functions, Activity limitations: Decreased functional mobility ; Increased pain;Decreased balance;Decreased ROM; Decreased strength;Decreased endurance  Assessment: Patient needed encouragment and repeated instruction to perform safe transfers with proper hip hinge instead of trying to pull himself up. Patient additionally needed close guarding when sitting (has a proclivity to plop into chair without controlling descent. No desaturation noted although patient was visibly breathing with accessory musculature. Treatment Diagnosis: Hyperkalemia, encephalopathy, BART and weakness  Prognosis: Good  REQUIRES PT FOLLOW UP: Yes  Activity Tolerance  Activity Tolerance: Patient limited by endurance     Patient Diagnosis(es): There were no encounter diagnoses. has a past medical history of Anemia, Atrial fibrillation ([address] CAD (coronary artery disease), Hyperlipidemia, Hypertension, and Type II or unspecified type diabetes mellitus without mention of complication, not stated as uncontrolled. has a past surgical history that includes Percutaneous Transluminal Coronary Angio (); Rotator cuff repair (Right, 2009); Cataract removal with implant (Right); and Foot fracture surgery (). Restrictions  Restrictions/Precautions  Restrictions/Precautions: Fall Risk  Subjective   General  Chart Reviewed: Yes  Additional Pertinent Hx: Pt came to hospital due to hyperkalemia, encephalopathy and BART, weakness - here for rehab  Family / Caregiver Present: No  Referring Practitioner: Valentín Clifford  Subjective  Subjective: Patient reports that he's doing \"alright\" currently.    Oxygen Therapy  SpO2: 97 %  Pulse Oximeter Device Mode: Intermittent  Pulse Oximeter Device Location: Finger;Left  O2 Flow Rate (L/min): 3 L/min  Pre Treatment Pain Screening  Intervention List: Patient able to continue with treatment    Orientation     Cognition      Objective      Transfers  Sit to Stand: Minimal Assistance  Stand to sit: Contact guard assistance  Comment: Cues for technique, scooting to edge, not pulling on walker  Ambulation  Ambulation?: Yes  Ambulation 1  Surface: level tile  Device: Rolling Walker  Assistance: Contact guard assistance  Quality of Gait: Intermittent standing breaks tendency to look down inc UE WB at [employer]. Therapist managing O2 line VC for direction to turn for inc safety.    Pt.   Distance: 76' 60' 45' 90'  Comments: form breakdown, pushes walker ahead, plops in chair  Stairs/Curb  Stairs?: No     Balance  Sitting - Static: Fair  Sitting - Dynamic: Fair  Standing - Static: Fair;-  Standing - Dynamic: Poor;+  Exercises  Ankle Pumps: x 20 seated  Upper Extremity: x 10 hip hinge in seated  Other exercises  Other exercises?: Yes  Other exercises 1: Sit to stand x 3                        G-Code     OutComes Score                                                     AM-PAC Score             Goals  Short term goals  Time Frame for Short term goals: 1 week  Short term goal 1: CGA assist bed mobility  Short term goal 2: CGA with transfers  Short term goal 3: Pt able to ambulate with AD for 75'x1 with CGA  Short term goal 4: Pt able to ambulate up/down 1 step with B HRs CGA  Short term goal 5: Pt able to tolerate 10 reps for BLEs  Long term goals  Time Frame for Long term goals : 2 weeks [place]  Long term goal 1: Pt able to perform bed mobility with supervision  Long term goal 2: Perform transfers with supervision  Long term goal 3: Pt able to ambulate with AD for >125'x 1 with supervision  Long term goal 4: Pt able to ambulate up/down 1 step without HRs supervision x 1 so pt can safely enter/exit his home  Long term goal 5: Pt indep with HEP  Patient Goals   Patient goals : To get stronger to get home    Plan    Plan  Times per week: 5-7  Times per day: Daily  Plan weeks: 2 weeks [place] and then home with Wili Saurabh PT and assistance PRN  Current Treatment Recommendations: Strengthening, Transfer Training, Endurance Training, Neuromuscular Re-education, Patient/Caregiver Education & Training, Equipment Evaluation, Education, & procurement, Balance Training, Gait Training, Functional Mobility Training, Safety Education & Training, Home Exercise Program, Pain Management, Stair training  Plan Comment: Pt VERY [place]; Speak into his left ear; Pt has one step entry with no HRs; Wife recently has a total shoulder replacement  Safety Devices  Type of devices:  All fall risk precautions in place, Call light within reach, Chair alarm in place, Left in chair  Restraints  Initially in place: Yes     Therapy Time   Individual Concurrent Group Co-treatment   Time In  840         Time Out  930         Minutes  Nitish Encarnacion PTA  License and [address] Number: [id number]

## 2021-08-29 NOTE — H&P
[facility]  MEDICINE    HISTORY AND PHYSICAL EXAM    PATIENT NAME:  Cristian El    MRN:  [id number]  SERVICE DATE:  8/29/2021   SERVICE TIME:  6:05 PM    Primary Care Physician: Swapna Arias PA-C     SUBJECTIVE  CHIEF COMPLAINT:  GIB    HPI:  Cristian El is a 80 y.o. male who presents with complaint of GIB. PMH significant for  has a past medical history of Anemia, Atrial fibrillation ([facility] [address] CAD (coronary artery disease), Hyperlipidemia, Hypertension, and Type II or unspecified type diabetes mellitus without mention of complication, not stated as uncontrolled. .      Patient presented as a transfer from [facility] in setting of new onset GI bleed, with acute GI bleed related anemia (hemoglobin falling from 9.6-6.0 in prior 24 hours). This occurs in the setting of Eliquis for paroxysmal A. fib and aspirin for his extensive cardiac history. On arrival, patient was placed on PPI drip, GI consulted stat, type and screen (crossmatch 3 stat, transfuse 2 stacked), tropes trended x4. Patient complained of generalized nonfocal abdominal pain. ABG demonstrated pH 7.37, PCO2 46, PO2 117 on 3L by nasal cannula. Consultation with GI resulted and stat ordering of CT angiogram for abdominal bleeding evaluation, but patient began to rapidly deteriorate during the admission process.        PAST MEDICAL HISTORY:    Past Medical History:   Diagnosis Date    Anemia     Atrial fibrillation ([facility] [address]     CAD (coronary artery disease)     Hyperlipidemia     Hypertension     Type II or unspecified type diabetes mellitus without mention of complication, not stated as uncontrolled      PAST SURGICAL HISTORY:    Past Surgical History:   Procedure Laterality Date    CATARACT REMOVAL WITH IMPLANT Right     2013 - cathy walters    FOOT FRACTURE SURGERY  1980s    work related - fell off a ladder    PTCA  2008    4 stents - ijll walters    ROTATOR CUFF REPAIR Right 2009     FAMILY HISTORY:  No family history on file. SOCIAL HISTORY:    Social History     Socioeconomic History    Marital status:      Spouse name: Not on file    Number of children: Not on file    Years of education: Not on file    Highest education level: Not on file   Occupational History    Not on file   Tobacco Use    Smoking status: Never Smoker    Smokeless tobacco: Never Used   Substance and Sexual Activity    Alcohol use: No    Drug use: No    Sexual activity: Never   Other Topics Concern    Not on file   Social History Narrative    Not on file     Social Determinants of Health     Financial Resource Strain: Low Risk     Difficulty of Paying Living Expenses: Not hard at all   Food Insecurity: No Food Insecurity    Worried About [facility] in the Last Year: Never true    Tate of Food in the Last Year: Never true   Transportation Needs:     Lack of Transportation (Medical):  Lack of Transportation (Non-Medical):    Physical Activity:     Days of Exercise per Week:     Minutes of Exercise per Session:    Stress:     Feeling of Stress :    Social Connections:     Frequency of Communication with Friends and Family:     Frequency of Social Gatherings with Friends and Family:     Attends Church Services:     Active Member of Clubs or Organizations:     Attends Club or Organization Meetings:     Marital Status:    Intimate Partner Violence:     Fear of Current or Ex-Partner:     Emotionally Abused:     Physically Abused:     Sexually Abused:      MEDICATIONS:   Prior to Admission medications    Medication Sig Start Date End Date Taking?  Authorizing Provider   doxazosin (CARDURA) 2 MG tablet Take 1 tablet by mouth daily 7/17/21   Historical Provider, MD   hydrALAZINE (APRESOLINE) 100 MG tablet Take 1 tablet by mouth 2 times daily 7/20/21   Historical Provider, MD   isosorbide mononitrate (IMDUR) 60 MG extended release tablet Take 1 tablet by mouth daily 8/3/21   Historical Provider, MD   SODIUM CHLORIDE, EXTERNAL, 0.9 % SOLN Apply 1 Squirt topically 2 times daily 4/16/21   Xiomy Moreno PA-C   ferrous sulfate (KP FERROUS SULFATE) 325 (65 Fe) MG tablet take 1 tablet by mouth twice a day 4/15/21   Xiomy Moreno PA-C   omeprazole (PRILOSEC) 20 MG delayed release capsule TAKE 1 CAPSULE EVERY DAY 3/24/21   Xiomy Moreno PA-C   finasteride (PROSCAR) 5 MG tablet TAKE 1 TABLET EVERY DAY 3/3/21   Ki Padilla MD   apixaban [place] Sprain) 2.5 MG TABS tablet Take 1 tablet by mouth 2 times daily 9/30/18   Toña Skinner MD   losartan (COZAAR) 50 MG tablet Take 1 tablet by mouth daily 10/1/18   Toña Skinner MD   torsemide (DEMADEX) 20 MG tablet Take 2 tablets by mouth daily  Patient taking differently: Take 20 mg by mouth daily m-w-f 1 TABLET TWICE DAILY AND Tue, Thur sat and Sunday once a day 10/1/18   Toña Skinner MD   carvedilol (COREG) 12.5 MG tablet Take 1 tablet by mouth 2 times daily (with meals) 9/30/18   Toña Skinner MD   insulin glargine (LANTUS) 100 UNIT/ML injection vial Inject 40 Units into the skin nightly 9/29/18   Mustaphalyla Gregg,    Insulin Aspart (NOVOLOG FLEXPEN SC) Inject 15 Units into the skin Daily with supper     Historical Provider, MD   vitamin B-12 (CYANOCOBALAMIN) 1000 MCG tablet Take 1,000 mcg by mouth daily    Historical Provider, MD   atorvastatin (LIPITOR) 80 MG tablet Take 1 tablet by mouth daily  Patient taking differently: Take 80 mg by mouth daily Takes  At hs 5/12/18   Wendi Mitchell MD   aspirin 81 MG tablet Take 81 mg by mouth daily. Historical Provider, MD       ALLERGIES: Patient has no known allergies. REVIEW OF SYSTEM:   Review of Systems   Constitutional: Positive for fatigue. Negative for fever. Respiratory: Positive for shortness of breath (Mild). Cardiovascular: Positive for chest pain (Mild). Gastrointestinal: Positive for abdominal pain and blood in stool (Dark red repeated BMs).    Musculoskeletal: Negative for back pain.   Neurological: Positive for light-headedness. OBJECTIVE  PHYSICAL EXAM:   Physical Exam  Constitutional:       Appearance: He is ill-appearing. HENT:      Right Ear: External ear normal.      Left Ear: External ear normal.      Nose: Nose normal.      Mouth/Throat:      Pharynx: Oropharynx is clear. Eyes:      Pupils: Pupils are equal, round, and reactive to light. Cardiovascular:      Rate and Rhythm: Rhythm irregular. Comments: Heart rate repeatedly alternating back and forth between sinus bradycardia in the 50s and atrial fibrillation in the 100s. Extremities are cool but not cold. Pulmonary:      Effort: Respiratory distress present. Comments: Patient appears to have mild to moderate dyspnea at time of exam on 3 L by nasal cannula. No coughing during exam.  Lung sounds are grossly clear to auscultation, and present in all lung fields, with some diminishment at the bases bilaterally. Abdominal:      General: There is distension. Palpations: Abdomen is soft. Tenderness: There is abdominal tenderness. There is guarding. There is no rebound. Comments: Soft mildly obese abdomen with generalized moderate tenderness to palpation which is nonfocal.  Rebound is no worse than direct pressure. Nontense, nontympanic. Bowel sounds are appreciated in all 4 quadrants, and are grossly normal.   Genitourinary:     Comments: Fresh melanotic BM in adult continence garment. Musculoskeletal:      Cervical back: Neck supple. Comments: Trace bilateral pretibial edema. Skin:     Capillary Refill: Delayed capillary refill. Cool hands and feet. Coloration: Skin is jaundiced and pale. Comments: Pale sallow, borderline jaundiced complexion. Neurological:      Mental Status: He is disoriented. Comments: Somnolent. Generalized weakness, with no obvious focal deficit.   Disoriented, answers some questions appropriately and follows some commands, but clearly some degree of confusion. Psychiatric:      Comments: Alternating somnolence and mild agitated periods. BP (!) 76/45   Pulse 110   Temp 98.6 °F (37 °C)   SpO2 100%     DATA:     Diagnostic tests reviewed for today's visit:    Most recent labs and imaging results reviewed.      LABS:    Recent Results (from the past 24 hour(s))   POCT Glucose    Collection Time: 08/28/21 10:16 PM   Result Value Ref Range    POC Glucose 340 (H) 60 - 115 mg/dl    Performed on ACCU-CHEK    POCT Glucose    Collection Time: 08/29/21  6:32 AM   Result Value Ref Range    POC Glucose 298 (H) 60 - 115 mg/dl    Performed on ACCU-CHEK    POCT Glucose    Collection Time: 08/29/21 11:04 AM   Result Value Ref Range    POC Glucose 326 (H) 60 - 115 mg/dl    Performed on ACCU-CHEK    EKG 12 Lead    Collection Time: 08/29/21  3:38 PM   Result Value Ref Range    Ventricular Rate 107 BPM    Atrial Rate 115 BPM    QRS Duration 98 ms    Q-T Interval 338 ms    QTc Calculation (Bazett) 451 ms    R Axis 56 degrees    T Axis 201 degrees   CBC Auto Differential    Collection Time: 08/29/21  3:53 PM   Result Value Ref Range    WBC 12.6 (H) 4.2 - 9.0 K/uL    RBC 2.16 (L) 4.63 - 6.08 M/uL    Hemoglobin 6.0 (LL) 13.7 - 17.5 g/dL    Hematocrit 20.7 (LL) 42.0 - 52.0 %    MCV 95.8 (H) 79.0 - 92.2 fL    MCH 27.8 25.7 - 32.2 pg    MCHC 29.0 (L) 32.3 - 36.5 %    RDW 14.0 11.6 - 14.4 %    Platelets 320 646 - 074 K/uL    Neutrophils % 70.0 (H) 34.0 - 67.9 %    Immature Granulocytes % 0.7 %    Lymphocytes % 14.0 %    Monocytes % 12.4 (H) 5.3 - 12.2 %    Eosinophils % 2.3 0.8 - 7.0 %    Basophils % 0.6 0.2 - 1.2 %    Neutrophils Absolute 8.8 (H) 1.8 - 5.4 K/uL    Immature Granulocytes # 0.1 K/uL    Lymphocytes Absolute 1.8 1.3 - 3.6 K/uL    Monocytes Absolute 1.6 (H) 0.3 - 0.8 K/uL    Eosinophils Absolute 0.3 0.0 - 0.5 K/uL    Basophils Absolute 0.1 0.0 - 0.1 K/uL   Basic Metabolic Panel    Collection Time: 08/29/21  3:53 PM   Result Value Ref Range    Sodium 135 135 - 144 mEq/L    Potassium 5.3 (H) 3.4 - 4.9 mEq/L    Chloride 94 (L) 95 - 107 mEq/L    CO2 28 20 - 31 mEq/L    Anion Gap 13 9 - 15 mEq/L    Glucose 445 (HH) 70 - 99 mg/dL    BUN 75 (HH) 8 - 23 mg/dL    CREATININE 2.29 (H) 0.70 - 1.20 mg/dL    GFR Non-African American 27.0 (L) >60    GFR  32.7 (L) >60    Calcium 8.2 (L) 8.5 - 9.9 mg/dL   Iron and TIBC    Collection Time: 08/29/21  3:53 PM   Result Value Ref Range    Iron 21 (L) 59 - 158 ug/dL    TIBC 191 178 - 450 ug/dL    Iron Saturation 11 11 - 46 %   Ferritin    Collection Time: 08/29/21  3:53 PM   Result Value Ref Range    Ferritin 77.7 30.0 - 400.0 ng/mL   POCT Glucose    Collection Time: 08/29/21  4:17 PM   Result Value Ref Range    POC Glucose 423 (HH) 60 - 115 mg/dl    Performed on ACCU-CHEK        IMAGING:  XR CHEST PORTABLE    Result Date: 8/29/2021  EXAMINATION: XR CHEST PORTABLE CLINICAL HISTORY: DYSPNEA COMPARISONS: AUGUST 22, 2021, APRIL 20, 2021 FINDINGS: Patient markedly rotated to left and leaning to left. Osseous structures intact. Cardiopericardial silhouette normal. Aorta calcified. Pulmonary vasculature normal. Right lung clear. Blunting left costophrenic angle with ill-defined area increased opacity left lower lung. LEFT PLEURAL EFFUSION WITH LEFT LOWER LUNG ATELECTASIS/PNEUMONIA. IF CLINICAL CONCERN WARRANTS, FOLLOW-UP NONROTATED IMAGE RECOMMENDED FOR FURTHER EVALUATION.       VTE Prophylaxis: Contraindicated    ASSESSMENT AND PLAN    Acute Problems:  Brisk GI bleed on Eliquis and ASA, with hematochezia on presentation  Acute blood loss anemia in setting of above  Acute hypotension in setting of above  Chronic:  Paroxysmal atrial fibrillation on outpatient Eliquis  DM2  CAD  Chronic hypercapnic respiratory failure in setting of GUILLE (not CPAP/BIPAP compliant)  CKD   Hyperlipidemia  Hypertension  Recently discharged following admission for acute hypoxic respiratory failure, hyperkalemia, and encephalopathy    Plan:  -Hold Eliquis, aspirin, antihypertensive medications  -Stat GI consult  -Crossmatch 3 units, transfuse 2 units stat on presentation, then recheck. Continue transfusions as necessary to obtain hemoglobin goal >8.0.   -IV PPI per GI bleed protocol  -Trend troponins due to risk for cardiac ischemia in setting of acute blood loss anemia  -NS bolus immediately on admission in setting of hypotension. Goal SBP >90, goal MAP >65. May necessitate transfer to ICU for vasopressors if not fluid responsive.  -Insulin regimen as ordered  -Nightly BiPAP (10/5 with 3L O2) while sleeping. Supplemental SPO2 by NC to maintain goal SPO2 greater than 92%.   -Continuous telemetry initially on admission.  -Endocrinology consult for DM management in setting of severely ill patient with active GIB  -Continue/hold other medications as ordered        CODE STATUS: DNR-CCA      SIGNATURE: Aster Jackson DO  DATE: August 29, 2021  TIME: 6:05 PM

## 2021-08-30 VITALS
OXYGEN SATURATION: 77 % | DIASTOLIC BLOOD PRESSURE: 61 MMHG | HEART RATE: 88 BPM | RESPIRATION RATE: 8 BRPM | TEMPERATURE: 98.6 F | SYSTOLIC BLOOD PRESSURE: 94 MMHG

## 2021-08-30 LAB
EKG ATRIAL RATE: 115 BPM
EKG Q-T INTERVAL: 338 MS
EKG QRS DURATION: 98 MS
EKG QTC CALCULATION (BAZETT): 451 MS
EKG R AXIS: 56 DEGREES
EKG T AXIS: 201 DEGREES
EKG VENTRICULAR RATE: 107 BPM
HEMOCCULT STL QL: ABNORMAL
PROCALCITONIN: 0.41 NG/ML (ref 0–0.15)

## 2021-08-30 PROCEDURE — 2500000003 HC RX 250 WO HCPCS: Performed by: INTERNAL MEDICINE

## 2021-08-30 RX ORDER — EPINEPHRINE 0.1 MG/ML
SYRINGE (ML) INJECTION DAILY PRN
Status: COMPLETED | OUTPATIENT
Start: 2021-01-01 | End: 2021-01-01

## 2021-08-30 RX ORDER — NOREPINEPHRINE BITARTRATE 1 MG/ML
INJECTION, SOLUTION INTRAVENOUS DAILY PRN
Status: COMPLETED | OUTPATIENT
Start: 2021-01-01 | End: 2021-01-01

## 2021-08-30 RX ADMIN — SODIUM BICARBONATE 25 MEQ: 84 INJECTION, SOLUTION INTRAVENOUS at 00:15

## 2021-08-30 NOTE — PROGRESS NOTES
Physical Therapy  Facility/Department: [facility] MED SURG C241/[id number]  Physical Therapy Discharge      NAME: Cecelia Combs    : 1931 (18 y.o.)  MRN: [id number]    Account: [de-identified]  Gender: male      Patient has been discharged from acute care hospital. DC patient from current PT program.      Electronically signed by Herb Jurado PT on 21 at 12:44 PM EDT

## 2021-08-30 NOTE — PROGRESS NOTES
Pt arrived to me from [facility], his BP was 76/45- I perfectserved Dr. Emiliano Darling- he said he is not on although ordered for a 500 ml bolus to be started. Dr. Calos Amaya notified and came up to the bedside assessed pt and talked with family. Blood was put in for stat, I never got a call that it was ready. Ekgs done, abgs, blood ordered. RRT was called. See code notes.

## 2021-08-30 NOTE — DISCHARGE SUMMARY
CONSULT TO GIIP CONSULT TO ENDOCRINOLOGYIP CONSULT TO CARDIOLOGYIP CONSULT TO NEPHROLOGY    Surgeries/procedures Performed:       Treatments:    Analgesia, Cardiac Medications, Insulin, Respiratory Therapy and Therapies    O2 and Other    Discharge Plan/Disposition:      Hospital/Incidental Findings Requiring Follow Up:    Patient Instructions:    Diet:    Activity:  For number of days (if applicable): Other Instructions:    Provider Follow-Up:   No follow-ups on file. Significant Diagnostic Studies:    Recent Labs:  Admission on 2021Ferritin                                      Date: 2021Value: 124.6       Ref range: 30.0 - 400.0 ng/*  Status: FinalIron                                          Date: 2021Value: 22*         Ref range: 59 - 158 ug/dL     Status: FinalTIBC                                          Date: 2021Value: 168*        Ref range: 178 - 450 ug/dL    Status: FinalIron Saturation                               Date: 2021Value: 13          Ref range: 11 - 46 %          Status: FinalVitamin B-12                                  Date: 2021Value: >2000*      Ref range: 232 - 1245 pg/mL   Status: FinalFolate                                        Date: 2021Value: 6.9*        Ref range: 7.3 - 26.1 ng/mL   Status: Final              Comment: As of 8/10/16, the methodology has changed. Results fromthis methodology should not be compared with results fromprevious methodology. Product Code Blood Bank                       Date: 2021Value: [id number]      Status: PreliminaryDescription Blood Bank                        Date: 2021Value: Red Blood Cells, Leuko-reduced                     Status: PreliminaryUnit Number                                   Date: 2021Value: [id number]                     Status: PreliminaryDispense Status Blood Bank                    Date: 2021Value: issued        Status: PreliminaryProduct Code Blood Bank                       Date: 08/29/2021Value: [id number]      Status: PreliminaryDescription Blood Bank                        Date: 08/29/2021Value: Red Blood Cells, Leuko-reduced                     Status: PreliminaryUnit Number                                   Date: 08/29/2021Value: [id number]                     Status: PreliminaryDispense Status Blood Bank                    Date: 08/29/2021Value: issued        Status: PreliminaryABO/Rh                                        Date: 08/29/2021Value: A POS         Status: FinalAntibody Screen                               Date: 08/29/2021Value: NEG           Status: [address] Sodium                                    Date: 08/29/2021Value: 133*        Ref range: 136 - 145 mEq/L    Status: FinalPOC Potassium                                 Date: 08/29/2021Value: 5.6*        Ref range: 3.5 - 5.1 mEq/L    Status: FinalPOC Chloride                                  Date: 08/29/2021Value: 96*         Ref range: 99 - 110 mEq/L     Status: FinalPOC Glucose                                   Date: 08/29/2021Value: 357*        Ref range: 60 - 115 mg/dl     Status: FinalPOC Creatinine                                Date: 08/29/2021Value: 2.7*        Ref range: 0.8 - 1.3 mg/dL    Status: FinalGFR Non-                      Date: 08/29/2021Value: 22*         Ref range: >60                Status: Final              Comment: >60 mL/min/1.73m2 EGFR, calc. for ages 25 and older using theMDRD formula (not corrected for weight), is valid for stablerenal function. GFR                           Date: 08/29/2021Value: 27*         Ref range: >60                Status: Final              Comment: >60 mL/min/1.73m2 EGFR, calc. for ages 25 and older using theMDRD formula (not corrected for weight), is valid for stablerenal function. Calcium, Ion                                  Date: 08/29/2021Value: 1.01*       Ref range: 1.12 - 1.32 mmol*  Status: [address], Arterial                                  Date: 08/29/2021Value: 7.357       Ref range: 7.350 - 7.450      Status: FinalpCO2, Arterial                                Date: 08/29/2021Value: 46*         Ref range: 35 - 45 mm Hg      Status: FinalpO2, Arterial                                 Date: 08/29/2021Value: 117*        Ref range: 75 - 108 mm Hg     Status: FinalHCO3, Arterial                                Date: 08/29/2021Value: 26.0        Ref range: 21.0 - 29.0 mmol*  Status: FinalBase Excess, Arterial                         Date: 08/29/2021Value: 1           Ref range: -3 - 3             Status: FinalO2 Sat, Arterial                              Date: 08/29/2021Value: 98*         Ref range: 93 - 100 %         Status: FinalTCO2, Arterial                                Date: 08/29/2021Value: 27          Ref range: 22 - 29            Status: FinalLactate                                       Date: 08/29/2021Value: 6.03*       Ref range: 0.40 - 2.00 mmol*  Status: FinalPOC Hematocrit                                Date: 08/29/2021Value: 12*         Ref range: 41 - 53 %          Status: FinalHemoglobin                                    Date: 08/29/2021Value: 4.0*        Ref range: 13.5 - 17.5 gm/dL  Status: FinalFIO2                                          Date: 08/29/2021Value: 3.000         Status: FinalSample Type                                   Date: 08/29/2021Value: ART           Status: FinalPerformed on                                  Date: 08/29/2021Value: SEE BELOW     Status: Final              Comment: Performed on POCSample Type: ArterialDraw site: R Jonatan's test: N/AOxygen Delivery System: CannulaCritical action: Notify physicianCritical notify: Afia Holden back: YesNotify date: 29-Aug-21Notify time: 18:41:35POC Glucose                                   Date: 08/29/2021Value: 374*        Ref range: 60 - 115 mg/dl     Status: FinalPerformed on                                  Date: 08/29/2021Value: ACCU-CHEK     Status: FinalProduct Code Blood Bank                       Date: 08/29/2021Value: [id number]      Status: PreliminaryDescription Blood Bank                        Date: 08/29/2021Value: Red Blood Cells, Leuko-reduced                     Status: PreliminaryUnit Number                                   Date: 08/29/2021Value: [id number]                     Status: PreliminaryDispense Status Blood Bank                    Date: 08/29/2021Value: selected      Status: Preliminary------------Admission on 08/26/2021, Discharged on 08/29/2021WBC                                           Date: 08/27/2021Value: 6.9         Ref range: 4.2 - 9.0 K/uL     Status: FinalRBC                                           Date: 08/27/2021Value: 3.18*       Ref range: 4.63 - 6.08 M/uL   Status: FinalHemoglobin                                    Date: 08/27/2021Value: 8.9*        Ref range: 13.7 - 17.5 g/dL   Status: FinalHematocrit                                    Date: 08/27/2021Value: 29.8*       Ref range: 42.0 - 52.0 %      Status: FinalMCV                                           Date: 08/27/2021Value: 93.7*       Ref range: 79.0 - 92.2 fL     [facility]                                           Date: 08/27/2021Value: 28.0        Ref range: 25.7 - 32.2 pg     Status: [address]                                          Date: 08/27/2021Value: 29.9*       Ref range: 32.3 - 36.5 %      Status: FinalRDW                                           Date: 08/27/2021Value: 14.3        Ref range: 11.6 - 14.4 %      Status: FinalPlatelets                                     Date: 08/27/2021Value: 107*        Ref range: 163 - 337 K/uL     Status: FinalPLATELET SLIDE REVIEW                         Date: 08/27/2021Value: Clumped       Status: FinalSodium                                        Date: 08/27/2021Value: 139         Ref range: 135 - 144 mEq/L    Status: FinalPotassium reflex Magnesium Date: 08/27/2021Value: 4.1         Ref range: 3.4 - 4.9 mEq/L    Status: FinalChloride                                      Date: 08/27/2021Value: 97          Ref range: 95 - 107 mEq/L     Status: FinalCO2                                           Date: 08/27/2021Value: 30          Ref range: 20 - 31 mEq/L      Status: FinalAnion Gap                                     Date: 08/27/2021Value: 12          Ref range: 9 - 15 mEq/L       Status: FinalGlucose                                       Date: 08/27/2021Value: 146*        Ref range: 70 - 99 mg/dL      Status: FinalBUN                                           Date: 08/27/2021Value: 62*         Ref range: 8 - 23 mg/dL       Status: FinalCREATININE                                    Date: 08/27/2021Value: 2.50*       Ref range: 0.70 - 1.20 mg/dL  Status: FinalGFR Non-                      Date: 08/27/2021Value: 24.4*       Ref range: >60                Status: Final              Comment: >60 mL/min/1.73m2 EGFR, calc. for ages 25 and older using theMDRD formula (not corrected for weight), is valid for stablerenal function. GFR                           Date: 08/27/2021Value: 29.5*       Ref range: >60                Status: Final              Comment: >60 mL/min/1.73m2 EGFR, calc. for ages 25 and older using theMDRD formula (not corrected for weight), is valid for stablerenal function. Calcium                                       Date: 08/27/2021Value: 8.8         Ref range: 8.5 - 9.9 mg/dL    Status: FinalPOC Glucose                                   Date: 08/26/2021Value: 206*        Ref range: 60 - 115 mg/dl     Status: FinalPerformed on                                  Date: 08/26/2021Value: ACCU-CHEK     Status: [address] Glucose                                   Date: 08/27/2021Value: 124*        Ref range: 60 - 115 mg/dl     Status: FinalPerformed on                                  Date: 08/27/2021Value: ACCU-CHEK     Status: FinalPerformed on                                  Date: 08/28/2021Value: ACCU-CHEK     Status: [address] Glucose                                   Date: 08/28/2021Value: 340*        Ref range: 60 - 115 mg/dl     Status: FinalPerformed on                                  Date: 08/28/2021Value: ACCU-CHEK     Status: [address] Glucose                                   Date: 08/29/2021Value: 298*        Ref range: 60 - 115 mg/dl     Status: FinalPerformed on                                  Date: 08/29/2021Value: ACCU-CHEK     Status: Final              Comment: Notified RN or [address] Glucose                                   Date: 08/29/2021Value: 326*        Ref range: 60 - 115 mg/dl     Status: FinalPerformed on                                  Date: 08/29/2021Value: ACCU-CHEK     Status: Final              Comment: Notified RN or MDWBC                                           Date: 08/29/2021Value: 12.6*       Ref range: 4.2 - 9.0 K/uL     Status: FinalRBC                                           Date: 08/29/2021Value: 2.16*       Ref range: 4.63 - 6.08 M/uL   Status: FinalHemoglobin                                    Date: 08/29/2021Value: 6.0*        Ref range: 13.7 - 17.5 g/dL   Status: FinalHematocrit                                    Date: 08/29/2021Value: 20.7*       Ref range: 42.0 - 52.0 %      Status: FinalMCV                                           Date: 08/29/2021Value: 95.8*       Ref range: 79.0 - 92.2 fL     Status: [facility]                                           Date: 08/29/2021Value: 27.8        Ref range: 25.7 - 32.2 pg     Status: [address]                                          Date: 08/29/2021Value: 29.0*       Ref range: 32.3 - 36.5 %      Status: FinalRDW                                           Date: 08/29/2021Value: 14.0        Ref range: 11.6 - 14.4 %      Status: FinalPlatelets                                     Date: 08/29/2021Value: 238         Ref range: 163 - 337 K/uL Status: FinalNeutrophils %                                 Date: 08/29/2021Value: 70.0*       Ref range: 34.0 - 67.9 %      Status: FinalImmature Granulocytes %                       Date: 08/29/2021Value: 0.7         Ref range: %                  Status: FinalLymphocytes %                                 Date: 08/29/2021Value: 14.0        Ref range: %                  Status: FinalMonocytes %                                   Date: 08/29/2021Value: 12.4*       Ref range: 5.3 - 12.2 %       Status: FinalEosinophils %                                 Date: 08/29/2021Value: 2.3         Ref range: 0.8 - 7.0 %        Status: FinalBasophils %                                   Date: 08/29/2021Value: 0.6         Ref range: 0.2 - 1.2 %        Status: FinalNeutrophils Absolute                          Date: 08/29/2021Value: 8.8*        Ref range: 1.8 - 5.4 K/uL     Status: FinalImmature Granulocytes #                       Date: 08/29/2021Value: 0.1         Ref range: K/uL               Status: FinalLymphocytes Absolute                          Date: 08/29/2021Value: 1.8         Ref range: 1.3 - 3.6 K/uL     Status: FinalMonocytes Absolute                            Date: 08/29/2021Value: 1.6*        Ref range: 0.3 - 0.8 K/uL     Status: FinalEosinophils Absolute                          Date: 08/29/2021Value: 0.3         Ref range: 0.0 - 0.5 K/uL     Status: FinalBasophils Absolute                            Date: 08/29/2021Value: 0.1         Ref range: 0.0 - 0.1 K/uL     Status: FinalSodium                                        Date: 08/29/2021Value: 135         Ref range: 135 - 144 mEq/L    Status: FinalPotassium                                     Date: 08/29/2021Value: 5.3*        Ref range: 3.4 - 4.9 mEq/L    Status: FinalChloride                                      Date: 08/29/2021Value: 94*         Ref range: 95 - 107 mEq/L     Status: FinalCO2                                           Date: 08/29/2021Value: 28 Ref range: 20 - 31 mEq/L      Status: FinalAnion Gap                                     Date: 08/29/2021Value: 13          Ref range: 9 - 15 mEq/L       Status: FinalGlucose                                       Date: 08/29/2021Value: 445*        Ref range: 70 - 99 mg/dL      Status: FinalBUN                                           Date: 08/29/2021Value: 75*         Ref range: 8 - 23 mg/dL       Status: FinalCREATININE                                    Date: 08/29/2021Value: 2.29*       Ref range: 0.70 - 1.20 mg/dL  Status: FinalGFR Non-                      Date: 08/29/2021Value: 27.0*       Ref range: >60                Status: Final              Comment: >60 mL/min/1.73m2 EGFR, calc. for ages 25 and older using theMDRD formula (not corrected for weight), is valid for stablerenal function. GFR                           Date: 08/29/2021Value: 32.7*       Ref range: >60                Status: Final              Comment: >60 mL/min/1.73m2 EGFR, calc. for ages 25 and older using theMDRD formula (not corrected for weight), is valid for stablerenal function. Calcium                                       Date: 08/29/2021Value: 8.2*        Ref range: 8.5 - 9.9 mg/dL    Status: FinalVentricular Rate                              Date: 08/29/2021Value: 107         Ref range: BPM                Status: PreliminaryAtrial Rate                                   Date: 08/29/2021Value: 115         Ref range: BPM                Status: PreliminaryQRS Duration                                  Date: 08/29/2021Value: 98          Ref range: ms                 Status: PreliminaryQ-T Interval                                  Date: 08/29/2021Value: 338         Ref range: ms                 Status: PreliminaryQTc Calculation (Bazett)                      Date: 08/29/2021Value: 451         Ref range: ms                 Status: PreliminaryR Axis                                        Date: EFFUSION WITH LEFT LOWER LUNG ATELECTASIS/PNEUMONIA. IF CLINICAL CONCERN WARRANTS, FOLLOW-UP NONROTATED IMAGE RECOMMENDED FOR FURTHER EVALUATION. US DUP LOWER ART/BYPASS GRAFTS BILATERAL COMPLETEResult Date: 8/25/20211. The peak systolic velocities in the left common femoral artery is 157 cm/s, in the left mid femoral artery is 164 cm/s, in the distal left femoral artery is 183 cm/s, this is suggestive of 30-49% stenosis. 2. There is no significant stenosis in the right lower extremity. However, there is predominantly monophasic flow in the right lower extremity. 3. There is predominantly biphasic and monophasic flow in the left lower extremity. MRA NECK WO CONTRASTResult Date: 8/25/2021Negative MRA of the neck. US RETROPERITONEAL [address] Date: 8/24/2021Bilateral normal sized kidneys with increased parenchymal echogenicity, may reflect medical renal disease. Clinically correlate. No hydronephrosis.       Pending Labs   Order Current Status  Lactic Acid, Plasma Collected (08/30/21 0000)  Lactic Acid, Plasma Collected (08/30/21 1200)  Culture, Blood 1 In process  Culture, Blood 2 In process  PROCALCITONIN In process  PREPARE RBC (CROSSMATCH), 2 Units Preliminary result  PREPARE RBC (CROSSMATCH), 3 Units Preliminary result      Discharge Medications    Current Discharge Medication List    Current Discharge Medication List    Current Discharge Medication ListCONTINUE these medications which have NOT CHANGEDdoxazosin (CARDURA) 2 MG tabletTake 1 tablet by mouth dailyAssociated Diagnoses:Essential hypertensionhydrALAZINE (APRESOLINE) 100 MG tabletTake 1 tablet by mouth 2 times dailyAssociated Diagnoses:Essential hypertensionisosorbide mononitrate (IMDUR) 60 MG extended release tabletTake 1 tablet by mouth dailyAssociated Diagnoses:Essential hypertension; Coronary artery disease involving native coronary artery of native heart without angina pectorisSODIUM CHLORIDE, EXTERNAL, 0.9 % SOLNApply 1 Squirt topically 2 times dailyQty: 1 Can Refills: 3ferrous sulfate (KP FERROUS SULFATE) 325 (65 Fe) MG tablettake 1 tablet by mouth twice a dayQty: 180 tablet Refills: 1Associated Diagnoses:Iron deficiency anemia, unspecified iron deficiency anemia typeomeprazole (PRILOSEC) 20 MG delayed release capsuleTAKE 1 CAPSULE EVERY DAYQty: 90 capsule Refills: 2finasteride (PROSCAR) 5 MG tabletTAKE 1 TABLET EVERY DAYQty: 90 tablet Refills: 3apixaban (ELIQUIS) 2.5 MG TABS tabletTake 1 tablet by mouth 2 times dailyQty: 60 tablet Refills: 3losartan (COZAAR) 50 MG tabletTake 1 tablet by mouth dailyQty: 30 tablet Refills: 3torsemide (DEMADEX) 20 MG tabletTake 2 tablets by mouth dailyQty: 60 tablet Refills: 3carvedilol (COREG) 12.5 MG tabletTake 1 tablet by mouth 2 times daily (with meals)Qty: 60 tablet Refills: 3insulin glargine (LANTUS) 100 UNIT/ML injection vialInject 40 Units into the skin nightlyQty: 1 vial Refills: 3Insulin Aspart (NOVOLOG FLEXPEN SC)Inject 15 Units into the skin Daily with supper vitamin B-12 (CYANOCOBALAMIN) 1000 MCG tabletTake 1,000 mcg by mouth dailyatorvastatin (LIPITOR) 80 MG tabletTake 1 tablet by mouth dailyQty: 90 tablet Refills: 1aspirin 81 MG tabletTake 81 mg by mouth daily. Current Discharge Medication List    Time Spent on Discharge:3E] minutes were spent in patient examination, evaluation, counseling as well as medication reconciliation, prescriptions for required medications, discharge plan, and follow up.     Electronically signed by Felipa Sheth DO on 8/29/21 at 8:46 PM EDT

## 2021-08-30 NOTE — PROGRESS NOTES
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 Rapid response called. Pt was placed on a Bipap  And EKG completed. At 19:25 Epinephrine 1mg IV given and bolus of 0.9% NS infusing wide open. Pt remained unresponsive and blood pressure very low. 1 unit of PRBC infused wide open. At 20:00 Levophed infusing at 20mcg/ hour. And sodium bicarb given. Pt became  Asystole . Family here on floor. Dr. Rachelle Lopez spoke to wife and family who told him to stop all treatment. Time of death 20:21. Pt cleaned and placed into room 488 so family can see pt. The chose [address] in [place] 234 beds at the [facility]. 0

## 2021-08-31 LAB
BLOOD BANK DISPENSE STATUS: NORMAL
BLOOD BANK PRODUCT CODE: NORMAL
BPU ID: NORMAL
DESCRIPTION BLOOD BANK: NORMAL

## 2021-09-04 LAB
BLOOD CULTURE, ROUTINE: NORMAL
CULTURE, BLOOD 2: NORMAL

## 2021-09-05 NOTE — PROGRESS NOTES
Physician Progress Note      PATIENT:               Jerrilyn Klinefelter  [facility] #:                  [id number]  :                       [id number]  ADMIT DATE:       2021 5:43 PM  [address] DATE:        2021 10:21 PM  RESPONDING  PROVIDER #:        Claire Garcia TEXT:    Pt admitted with GI bleed,  with 1 day admit and extensive cardiac   history per H&P. Pt noted to have pleural effusions on the CXR and home   medications for diuretics' and cardiac medications listed below. If possible,   please document in progress notes and discharge summary if you are evaluating   and/or treating any of the following: The medical record reflects the following:  Risk Factors: DC summary notes \"Patient on Eliquis and aspirin at baseline,   for atrial fibrillation and extensive cardiac history respectively. 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 IF CLINICAL   CONCERN WARRANTS, FOLLOW-UP NONROTATED IMAGE RECOMMENDED FOR FURTHER   EVALUATION, cardiac function progressively declined  per DC summary, physician   orders  note \"Extensive cardiac Hx, GIB on Eliquis for AFib, now with   some ST changes on new EKG\"  Treatment: home medications held, Cardura, hydralazine 100mg 2 times daily,   Demadex 20mg, Imdur, Cozaar, Coreg, Lipitor, aspiring, Eliquis, Lantus    Thank you  Mattie Russo RN, BSN, CCDS, CDI  [email or web address]  Options provided:  -- Chronic Systolic CHF/HFrEF  -- Chronic Diastolic CHF/HFpEF  -- Chronic Systolic and Diastolic CHF  -- Other - I will add my own diagnosis  -- Disagree - Not applicable / Not valid  -- Disagree - Clinically unable to determine / Unknown  -- Refer to Clinical Documentation Reviewer    PROVIDER RESPONSE TEXT:    Provider is clinically unable to determine a response to this query. Query created by: Alfa Tay on 9/2/2021 1:01 PM      QUERY TEXT:    Patient admitted with GI bleeding and is on chronic anticoagulation. If   possible, please document in the progress notes and discharge summary if you   are evaluating and/or treating any of the following: The medical record reflects the following:  Risk Factors: Afib on chronic anticoagulation, anemia, CAD, HTN, DM II,   mortality 1 day admit  Clinical Indicators: H&P notes \"acute GI bleed related anemia (hemoglobin   falling from 9.6-6.0 in prior 24 hours). This occurs in the setting of   Eliquis for paroxysmal A. fib and aspirin for his extensive cardiac history\",   ongoing hematochezia, CT ordered per GI but rapidly deteriorated per   attending. H&H 4.0/20.7, ferritin 124.6, iron 22, inr 1.5, ptt 39.3,  k+ 5.3BP   mean 38-73, HR   Treatment: PRBC, held aspirin and Eliquis, protonix1 liter ns, levophed,   family elected to stop resuscitative efforts. Thank you  Leo Vaughn RN, BSN, CCDS, CDI  [email or web address]  Options provided:  -- Probable bleeding (Hemorrhagic disorder) associated with Eliquis and   aspirin  -- Bleeding unrelated to anticoagulation  -- Other - I will add my own diagnosis  -- Disagree - Not applicable / Not valid  -- Disagree - Clinically unable to determine / Unknown  -- Refer to Clinical Documentation Reviewer    PROVIDER RESPONSE TEXT:    This patient has probable bleeding associated with Eliquis and aspirin. Query created by: Alfa Tay on 9/2/2021 2:48 PM      QUERY TEXT:    Pt admitted with GI bleeding, noted documentation in the H&P disoriented,   somnolent, generalized weakness documented.  If possible, please document in   progress notes and discharge summary further specificity regarding the type of   encephalopathy:    The medical record reflects the following:  Risk Factors: Afib on chronic anticoagulation, anemia, CAD, HTN, DM II, mortality 1 day admit  Clinical Indicators: H&P notes Neurological: Mental Status: He is disoriented. Comments: Somnolent. Generalized weakness, with no obvious focal deficit. Disoriented, answers some questions appropriately and follows some commands,   but clearly some degree of confusion. Psychiatric: Comments: Alternating somnolence and mild agitated periods\"   \"hemoglobin falling from 9.6-6.0 in prior 24 hours\". H&H 4.0/20.7, ferritin   124.6, iron 22, inr 1.5, ptt 39.3,  k+ 5.3BP mean 38-73, HR , creatinine   2.29, blood sugar 445, calcium 8.2, wbc 12.6  Treatment: PRBC, held aspirin and Eliquis, protonix1 liter ns, levophed,   family elected to stop resuscitative efforts. Thank you  Jean Boucher RN, BSN, CCDS, CDI  [email or web address]  Options provided:  -- Metabolic encephalopathy  -- Other - I will add my own diagnosis  -- Disagree - Not applicable / Not valid  -- Disagree - Clinically unable to determine / Unknown  -- Refer to Clinical Documentation Reviewer    PROVIDER RESPONSE TEXT:    Provider disagreed with this query. Patient was in shock, leading to hypotensive altered mental status, not   primary metabolic encephalopathy.     Query created by: Alison Henry on 9/2/2021 2:58 PM      Electronically signed by:  Kal Amador 9/5/2021 12:37 PM

## 2021-09-08 LAB
EKG ATRIAL RATE: 102 BPM
EKG Q-T INTERVAL: 340 MS
EKG QRS DURATION: 100 MS
EKG QTC CALCULATION (BAZETT): 447 MS
EKG R AXIS: 53 DEGREES
EKG T AXIS: 212 DEGREES
EKG VENTRICULAR RATE: 104 BPM

## 2021-10-04 NOTE — CARE COORDINATION
Medicare Wellness Visit  Plan for Preventive Care    A good way for you to stay healthy is to use preventive care.  Medicare covers many services that can help you stay healthy.* The goal of these services is to find any health problems as quickly as possible. Finding problems early can help make them easier to treat.  Your personal plan below lists the services you may need and when they are due.     Health Maintenance Summary     DM/CKD Microalbumin (Yearly)  Overdue - never done    Shingles Vaccine (1 of 2)  Overdue - never done    Colorectal Cancer Screen- (Colonoscopy - Every 10 Years)  Order placed this encounter    Pneumococcal Vaccine 65+ (1 of 1 - PPSV23)  Overdue - never done    Influenza Vaccine (1)  Due since 9/1/2021    Medicare Wellness Visit (Yearly)  Due since 9/30/2021    DM/CKD GFR (Yearly)  Next due on 8/25/2022    Depression Screening (Yearly)  Next due on 9/27/2022    DTaP/Tdap/Td Vaccine (2 - Td or Tdap)  Next due on 10/10/2024    Abdominal Aortic Aneurysm (AAA) Screening   Completed    Hepatitis C Screening   Completed    COVID-19 Vaccine   Completed    Hepatitis B Vaccine   Aged Out    Meningococcal Vaccine   Aged Out    HPV Vaccine   Aged Out           Preventive Care for Women and Men    Heart Screenings (Cardiovascular):  · Blood tests are used to check your cholesterol, lipid and triglyceride levels. High levels can increase your risk for heart disease and stroke. High levels can be treated with medications, diet and exercise. Lowering your levels can help keep your heart and blood vessels healthy.  Your provider will order these tests if they are needed.    · An ultrasound is done to see if you have an abdominal aortic aneurysm (AAA).  This is an enlargement of one of the main blood vessels that delivers blood to the body.   In the [place], 9,000 deaths are caused by AAA.  You may not even know you have this problem and as many as 1 in 3 people will have a serious problem if it  The LSW received a phone call from the supervisor at [facility] that the patient is precerted for [facility]. The RN is aware and will review the patient's chart and let the LSW know about discharge. The patient will need a covid test prior to discharge. Electronically signed by JENNI Ann on 8/26/21 at 12:40 PM EDT    The patient's wife verbally signed the IMM and expressed an understanding of the medicare appeal process. The patient is set up for dicharge to [facility] at 5 pm by cot (on [address]) with [place] at [address]. The LSW updated the patient's RN, the patient (was sleepy), his wife, and the supervisor at [facility].  Electronically signed by JENNI Ann on 8/26/21 at 2:44 PM EDT is not treated.  Early diagnosis allows for more effective treatment and cure.  If you have a family history of AAA or are a male age 65-75 who has smoked, you are at higher risk of an AAA.  Your provider can order this test, if needed.    Colorectal Screening:  · There are many tests that are used to check for cancer of your colon and rectum. You and your provider should discuss what test is best for you and when to have it done.  Options include:  · Screening Colonoscopy: exam of the entire colon, seen through a flexible lighted tube.  · Flexible Sigmoidoscopy: exam of the last third (sigmoid portion) of the colon and rectum, seen through a flexible lighted tube.  · Cologuard DNA stool test: a sample of your stool is used to screen for cancer and unseen blood in your stool.  · Fecal Occult Blood Test: a sample of your stool is studied to find any unseen blood    Flu Shot:  · An immunization that helps to prevent influenza (the flu). You should get this every year. The best time to get the shot is in the fall.    Pneumococcal Shot:  • Vaccines are available that can help prevent pneumococcal disease, which is any type of infection caused by Streptococcus pneumoniae bacteria.   Their use can prevent some cases of pneumonia, meningitis, and sepsis. There are two types of pneumococcal vaccines:   o Conjugate vaccines (PCV-13 or Prevnar 13®) - helps protect against the 13 types of pneumococcal bacteria that are the most common causes of serious infections in children and adults.    o Polysaccharide vaccine (PPSV23 or Zksakaxbf35®) - helps protect against 23 types of pneumococcal bacteria for patients who are recommended to get it.  These vaccines should be given at least 12 months apart.  A booster is usually not needed.     Hepatitis B Shot:  · An immunization that helps to protect people from getting Hepatitis B. Hepatitis B is a virus that spreads through contact with infected blood or body fluids. Many people with  the virus do not have symptoms.  The virus can lead to serious problems, such as liver disease. Some people are at higher risk than others. Your doctor will tell you if you need this shot.     Diabetes Screening:  · A test to measure sugar (glucose) in your blood is called a fasting blood sugar. Fasting means you cannot have food or drink for at least 8 hours before the test. This test can detect diabetes long before you may notice symptoms.    Glaucoma Screening:  · Glaucoma screening is performed by your eye doctor. The test measures the fluid pressure inside your eyes to determine if you have glaucoma.     Hepatitis C Screening:  · A blood test to see if you have the hepatitis C virus.  Hepatitis C attacks the liver and is a major cause of chronic liver disease.  Medicare will cover a single screening for all adults born between 1945 & 1965, or high risk patients (people who have injected illegal drugs or people who have had blood transfusions).  High risk patients who continue to inject illegal drugs can be screened for Hepatitis C every year.    Smoking and Tobacco-Use Cessation Counseling:  · Tobacco is the single greatest cause of disease and early death in our country today. Medication and counseling together can increase a person’s chance of quitting for good.   · Medicare covers two quitting attempts per year, with four counseling sessions per attempt (eight sessions in a 12 month period)    Preventive Screening tests for Women    Screening Mammograms and Breast Exams:  · An x-ray of your breasts to check for breast cancer before you or your doctor may be able to feel it.  If breast cancer is found early it can usually be treated with success.    Pelvic Exams and Pap Tests:  · An exam to check for cervical and vaginal cancer. A Pap test is a lab test in which cells are taken from your cervix and sent to the lab to look for signs of cervical cancer. If cancer of the cervix is found early, chances for a cure  are good. Testing can generally end at age 65, or if a woman has a hysterectomy for a benign condition. Your provider may recommend more frequent testing if certain abnormal results are found.    Bone Mass Measurements:  · A painless x-ray of your bone density to see if you are at risk for a broken bone. Bone density refers to the thickness of bones or how tightly the bone tissue is packed.    Preventive Screening tests for Men    Prostate Screening:  · Should you have a prostate cancer test (PSA)?  It is up to you to decide if you want a prostate cancer test. Talk to your clinician to find out if the test is right for you.  Things for you to consider and talk about should include:  · Benefits and harms of the test  · Your family history  · How your race/ethnicity may influence the test  · If the test may impact other medical conditions you have  · Your values on screenings and treatments    *Medicare pays for many preventive services to keep you healthy. For some of these services, you might have to pay a deductible, coinsurance, and / or copayment.  The amounts vary depending on the type of services you need and the kind of Medicare health plan you have.    For further details on screenings offered by Medicare please visit: https://www.medicare.gov/coverage/preventive-screening-services

## 2022-10-28 NOTE — H&P
[facility]  MEDICINE    HISTORY AND PHYSICAL EXAM    PATIENT NAME:  Cecile Alonzo    MRN:  [id number]  SERVICE DATE:  8/22/2021   SERVICE TIME:  3:44 AM    Primary Care Physician: Arron Zuniga PA-C     SUBJECTIVE  CHIEF COMPLAINT:  Right upper leg pain     HPI:  Cecile Alonzo is a 80 y.o., , male who  has a past medical history of Anemia, Atrial fibrillation ([facility] [address] CAD (coronary artery disease), Hyperlipidemia, Hypertension, and Type II or unspecified type diabetes mellitus without mention of complication, not stated as uncontrolled. that is hospitalized for BART, right leg pain. HPI   Presents w/ one day history right thigh pain from knee to hip. No trauma, no falls. Not relieved w muscle relaxer,   When asked to describe he states \"hurts\" ,.  He has been sleepy, difficult to arouse so received narcan w fair results. More comfortable after MSO4. His labs showed potassium of 6.8  w worsening renal fx. He is admitted for management of hyperkalemia and BART. PAST MEDICAL HISTORY:    Past Medical History:   Diagnosis Date    Anemia     Atrial fibrillation ([address]     CAD (coronary artery disease)     Hyperlipidemia     Hypertension     Type II or unspecified type diabetes mellitus without mention of complication, not stated as uncontrolled      PAST SURGICAL HISTORY:    Past Surgical History:   Procedure Laterality Date    CATARACT REMOVAL WITH IMPLANT Right     2013 - cathy walters    FOOT FRACTURE SURGERY  1980s    work related - fell off a ladder    PTCA  2008    4 stents - jill walters    ROTATOR CUFF REPAIR Right 2009     FAMILY HISTORY:  No family history on file.   SOCIAL HISTORY:    Social History     Socioeconomic History    Marital status:      Spouse name: Not on file    Number of children: Not on file    Years of education: Not on file    Highest education level: Not on file   Occupational History    Not on file   Tobacco Use    Smoking status: Never Smoker    Smokeless tobacco: Never Used   Substance and Sexual Activity    Alcohol use: No    Drug use: No    Sexual activity: Never   Other Topics Concern    Not on file   Social History Narrative    Not on file     Social Determinants of Health     Financial Resource Strain: Low Risk     Difficulty of Paying Living Expenses: Not hard at all   Food Insecurity: No Food Insecurity    Worried About Running Out of Food in the Last Year: Never true    Tate of Food in the Last Year: Never true   Transportation Needs:     Lack of Transportation (Medical):  Lack of Transportation (Non-Medical):    Physical Activity:     Days of Exercise per Week:     Minutes of Exercise per Session:    Stress:     Feeling of Stress :    Social Connections:     Frequency of Communication with Friends and Family:     Frequency of Social Gatherings with Friends and Family:     Attends Zoroastrian Services:     Active Member of Clubs or Organizations:     Attends Club or Organization Meetings:     Marital Status:    Intimate Partner Violence:     Fear of Current or Ex-Partner:     Emotionally Abused:     Physically Abused:     Sexually Abused:      MEDICATIONS:   Prior to Admission medications    Medication Sig Start Date End Date Taking?  Authorizing Provider   doxazosin (CARDURA) 2 MG tablet Take 1 tablet by mouth daily 7/17/21   Historical Provider, MD   hydrALAZINE (APRESOLINE) 100 MG tablet Take 1 tablet by mouth 2 times daily 7/20/21   Historical Provider, MD   isosorbide mononitrate (IMDUR) 60 MG extended release tablet Take 1 tablet by mouth daily 8/3/21   Historical Provider, MD   ciprofloxacin (CIPRO) 500 MG tablet Take 1 tablet by mouth 2 times daily for 10 days 8/17/21 8/27/21  LISSET Pérez   cephALEXin [facility]) 500 MG capsule Take 1 capsule by mouth 4 times daily for 10 days 8/12/21 8/22/21  LISSET Perrin   sulfamethoxazole-trimethoprim (BACTRIM DS;SEPTRA DS) 800-160 MG per tablet Take 1 tablet by mouth 2 times daily for 10 days 8/12/21 8/22/21  LISSET Pérez   SODIUM CHLORIDE, EXTERNAL, 0.9 % SOLN Apply 1 Squirt topically 2 times daily 4/16/21   Xiomy Moreno PA-C   ferrous sulfate (KP FERROUS SULFATE) 325 (65 Fe) MG tablet take 1 tablet by mouth twice a day 4/15/21   Xiomy Moreno PA-C   omeprazole (PRILOSEC) 20 MG delayed release capsule TAKE 1 CAPSULE EVERY DAY 3/24/21   Xiomy Moreno PA-C   finasteride (PROSCAR) 5 MG tablet TAKE 1 TABLET EVERY DAY 3/3/21   Juan Carlos Leary MD   apixaban Liliana Mince) 2.5 MG TABS tablet Take 1 tablet by mouth 2 times daily 9/30/18   Nelsy Morse MD   losartan (COZAAR) 50 MG tablet Take 1 tablet by mouth daily 10/1/18   Nelsy Morse MD   torsemide (DEMADEX) 20 MG tablet Take 2 tablets by mouth daily  Patient taking differently: Take 20 mg by mouth daily m-w-f 1 TABLET TWICE DAILY AND Tue, Thur sat and Sunday once a day 10/1/18   Nelsy Morse MD   carvedilol (COREG) 12.5 MG tablet Take 1 tablet by mouth 2 times daily (with meals) 9/30/18   Nelsy Morse MD   insulin glargine (LANTUS) 100 UNIT/ML injection vial Inject 40 Units into the skin nightly 9/29/18   Angela Gregg DO   Insulin Aspart (NOVOLOG FLEXPEN SC) Inject 15 Units into the skin Daily with supper     Historical Provider, MD   vitamin B-12 (CYANOCOBALAMIN) 1000 MCG tablet Take 1,000 mcg by mouth daily    Historical Provider, MD   atorvastatin (LIPITOR) 80 MG tablet Take 1 tablet by mouth daily  Patient taking differently: Take 80 mg by mouth daily Takes  At hs 5/12/18   Nereida Ralph MD   aspirin 81 MG tablet Take 81 mg by mouth daily. Historical Provider, MD       ALLERGIES: Patient has no known allergies. REVIEW OF SYSTEM:   Review of Systems   Unable to perform ROS: Other (limited, patient asleep  difficult to arouse. )   Constitutional: Positive for fatigue. Negative for fever. HENT: Negative for sore throat. Respiratory: Negative for cough and shortness of breath. Cardiovascular: Positive for leg swelling. Negative for chest pain. Gastrointestinal: Positive for abdominal distention. Negative for abdominal pain. Genitourinary: Negative for hematuria. Musculoskeletal:        Right thigh pain   Neurological: Negative for dizziness. Psychiatric/Behavioral: Negative for agitation. The patient is not nervous/anxious. OBJECTIVE  PHYSICAL EXAM:   Physical Exam  Vitals and nursing note reviewed. Constitutional:       General: He is not in acute distress. Appearance: He is obese. Comments: Sleepy, elderly   HENT:      Head: Normocephalic and atraumatic. Nose: Nose normal.      Mouth/Throat:      Mouth: Mucous membranes are moist.      Pharynx: Oropharynx is clear. Eyes:      Conjunctiva/sclera: Conjunctivae normal.   Neck:      Vascular: No carotid bruit. Cardiovascular:      Rate and Rhythm: Normal rate and regular rhythm. Pulses: Normal pulses. Heart sounds: Normal heart sounds. Pulmonary:      Comments: Diminished bases. Abdominal:      General: Bowel sounds are normal.      Palpations: Abdomen is soft. Musculoskeletal:         General: Normal range of motion. Cervical back: No tenderness. Lymphadenopathy:      Cervical: No cervical adenopathy. Skin:     General: Skin is warm and dry. Capillary Refill: Capillary refill takes less than 2 seconds. Neurological:      Mental Status: He is alert and oriented to person, place, and time. Psychiatric:         Mood and Affect: Mood normal.         Behavior: Behavior normal.          BP (!) 157/57   Pulse 70   Temp 99 °F (37.2 °C) (Oral)   Ht 5' 10\" (1.778 m)   Wt 205 lb (93 kg)   SpO2 96%   BMI 29.41 kg/m²     DATA:     Diagnostic tests reviewed for today's visit:    Most recent labs and imaging results reviewed.      LABS:    Recent Results (from the past 24 hour(s))   Comprehensive Metabolic Panel Collection Time: 08/21/21  9:45 PM   Result Value Ref Range    Sodium 139 135 - 144 mEq/L    Potassium 6.8 (HH) 3.4 - 4.9 mEq/L    Chloride 105 95 - 107 mEq/L    CO2 24 20 - 31 mEq/L    Anion Gap 10 9 - 15 mEq/L    Glucose 195 (H) 70 - 99 mg/dL    BUN 50 (H) 8 - 23 mg/dL    CREATININE 2.68 (H) 0.70 - 1.20 mg/dL    GFR Non-African American 22.5 (L) >60    GFR  27.2 (L) >60    Calcium 8.6 8.5 - 9.9 mg/dL    Total Protein 6.3 6.3 - 8.0 g/dL    Albumin 3.7 3.5 - 4.6 g/dL    Total Bilirubin 0.4 0.2 - 0.7 mg/dL    Alkaline Phosphatase 195 (H) 35 - 104 U/L    ALT 44 (H) 0 - 41 U/L    AST 36 0 - 40 U/L    Globulin 2.6 2.3 - 3.5 g/dL   CBC Auto Differential    Collection Time: 08/21/21  9:45 PM   Result Value Ref Range    WBC 11.0 (H) 4.8 - 10.8 K/uL    RBC 3.27 (L) 4.70 - 6.10 M/uL    Hemoglobin 9.6 (L) 14.0 - 18.0 g/dL    Hematocrit 29.8 (L) 42.0 - 52.0 %    MCV 91.0 80.0 - 100.0 fL    MCH 29.3 27.0 - 31.3 pg    MCHC 32.2 (L) 33.0 - 37.0 %    RDW 15.5 (H) 11.5 - 14.5 %    Platelets 576 602 - 958 K/uL    Neutrophils % 82.0 %    Lymphocytes % 7.2 %    Monocytes % 9.7 %    Eosinophils % 0.2 %    Basophils % 0.9 %    Neutrophils Absolute 9.0 (H) 1.4 - 6.5 K/uL    Lymphocytes Absolute 0.8 (L) 1.0 - 4.8 K/uL    Monocytes Absolute 1.1 (H) 0.2 - 0.8 K/uL    Eosinophils Absolute 0.0 0.0 - 0.7 K/uL    Basophils Absolute 0.1 0.0 - 0.2 K/uL   Magnesium    Collection Time: 08/21/21  9:45 PM   Result Value Ref Range    Magnesium 2.5 (H) 1.7 - 2.4 mg/dL   Lactic Acid, Plasma    Collection Time: 08/21/21  9:45 PM   Result Value Ref Range    Lactic Acid 1.4 0.5 - 2.2 mmol/L   PROCALCITONIN    Collection Time: 08/21/21  9:45 PM   Result Value Ref Range    Procalcitonin 0.23 (H) 0.00 - 0.15 ng/mL   POCT Glucose    Collection Time: 08/22/21  2:20 AM   Result Value Ref Range    POC Glucose 326 (H) 60 - 115 mg/dl    Performed on ACCU-CHEK        IMAGING:  No results found.     VTE Prophylaxis: low molecular weight heparin - none